# Patient Record
Sex: FEMALE | Race: WHITE | NOT HISPANIC OR LATINO | Employment: UNEMPLOYED | URBAN - METROPOLITAN AREA
[De-identification: names, ages, dates, MRNs, and addresses within clinical notes are randomized per-mention and may not be internally consistent; named-entity substitution may affect disease eponyms.]

---

## 2018-10-10 ENCOUNTER — HOSPITAL ENCOUNTER (EMERGENCY)
Facility: MEDICAL CENTER | Age: 23
End: 2018-10-10
Attending: EMERGENCY MEDICINE
Payer: MEDICAID

## 2018-10-10 VITALS
DIASTOLIC BLOOD PRESSURE: 70 MMHG | HEART RATE: 75 BPM | SYSTOLIC BLOOD PRESSURE: 120 MMHG | OXYGEN SATURATION: 98 % | BODY MASS INDEX: 20.5 KG/M2 | HEIGHT: 65 IN | WEIGHT: 123.02 LBS | RESPIRATION RATE: 18 BRPM | TEMPERATURE: 98 F

## 2018-10-10 DIAGNOSIS — N12 PYELONEPHRITIS: ICD-10-CM

## 2018-10-10 LAB
ALBUMIN SERPL BCP-MCNC: 4.9 G/DL (ref 3.2–4.9)
ALBUMIN/GLOB SERPL: 1.8 G/DL
ALP SERPL-CCNC: 49 U/L (ref 30–99)
ALT SERPL-CCNC: 9 U/L (ref 2–50)
ANION GAP SERPL CALC-SCNC: 12 MMOL/L (ref 0–11.9)
APPEARANCE UR: ABNORMAL
AST SERPL-CCNC: 15 U/L (ref 12–45)
BACTERIA #/AREA URNS HPF: ABNORMAL /HPF
BASOPHILS # BLD AUTO: 0.6 % (ref 0–1.8)
BASOPHILS # BLD: 0.07 K/UL (ref 0–0.12)
BILIRUB SERPL-MCNC: 0.5 MG/DL (ref 0.1–1.5)
BILIRUB UR QL STRIP.AUTO: NEGATIVE
BUN SERPL-MCNC: 13 MG/DL (ref 8–22)
CALCIUM SERPL-MCNC: 9.3 MG/DL (ref 8.5–10.5)
CHLORIDE SERPL-SCNC: 105 MMOL/L (ref 96–112)
CO2 SERPL-SCNC: 20 MMOL/L (ref 20–33)
COLOR UR: YELLOW
CREAT SERPL-MCNC: 0.92 MG/DL (ref 0.5–1.4)
EOSINOPHIL # BLD AUTO: 0.07 K/UL (ref 0–0.51)
EOSINOPHIL NFR BLD: 0.6 % (ref 0–6.9)
EPI CELLS #/AREA URNS HPF: NEGATIVE /HPF
ERYTHROCYTE [DISTWIDTH] IN BLOOD BY AUTOMATED COUNT: 48.2 FL (ref 35.9–50)
GLOBULIN SER CALC-MCNC: 2.8 G/DL (ref 1.9–3.5)
GLUCOSE SERPL-MCNC: 71 MG/DL (ref 65–99)
GLUCOSE UR STRIP.AUTO-MCNC: NEGATIVE MG/DL
HCG UR QL: NEGATIVE
HCT VFR BLD AUTO: 43.3 % (ref 37–47)
HGB BLD-MCNC: 14.7 G/DL (ref 12–16)
IMM GRANULOCYTES # BLD AUTO: 0.03 K/UL (ref 0–0.11)
IMM GRANULOCYTES NFR BLD AUTO: 0.2 % (ref 0–0.9)
KETONES UR STRIP.AUTO-MCNC: ABNORMAL MG/DL
LEUKOCYTE ESTERASE UR QL STRIP.AUTO: ABNORMAL
LYMPHOCYTES # BLD AUTO: 1.08 K/UL (ref 1–4.8)
LYMPHOCYTES NFR BLD: 8.9 % (ref 22–41)
MCH RBC QN AUTO: 32.9 PG (ref 27–33)
MCHC RBC AUTO-ENTMCNC: 33.9 G/DL (ref 33.6–35)
MCV RBC AUTO: 96.9 FL (ref 81.4–97.8)
MICRO URNS: ABNORMAL
MONOCYTES # BLD AUTO: 0.72 K/UL (ref 0–0.85)
MONOCYTES NFR BLD AUTO: 5.9 % (ref 0–13.4)
NEUTROPHILS # BLD AUTO: 10.16 K/UL (ref 2–7.15)
NEUTROPHILS NFR BLD: 83.8 % (ref 44–72)
NITRITE UR QL STRIP.AUTO: POSITIVE
NRBC # BLD AUTO: 0 K/UL
NRBC BLD-RTO: 0 /100 WBC
PH UR STRIP.AUTO: 6.5 [PH]
PLATELET # BLD AUTO: 301 K/UL (ref 164–446)
PMV BLD AUTO: 10.1 FL (ref 9–12.9)
POTASSIUM SERPL-SCNC: 3.7 MMOL/L (ref 3.6–5.5)
PROT SERPL-MCNC: 7.7 G/DL (ref 6–8.2)
PROT UR QL STRIP: 30 MG/DL
RBC # BLD AUTO: 4.47 M/UL (ref 4.2–5.4)
RBC # URNS HPF: ABNORMAL /HPF
RBC UR QL AUTO: ABNORMAL
SODIUM SERPL-SCNC: 137 MMOL/L (ref 135–145)
SP GR UR REFRACTOMETRY: 1.01
SP GR UR STRIP.AUTO: 1.01
UROBILINOGEN UR STRIP.AUTO-MCNC: 0.2 MG/DL
WBC # BLD AUTO: 12.1 K/UL (ref 4.8–10.8)
WBC #/AREA URNS HPF: ABNORMAL /HPF

## 2018-10-10 PROCEDURE — 80053 COMPREHEN METABOLIC PANEL: CPT

## 2018-10-10 PROCEDURE — 700102 HCHG RX REV CODE 250 W/ 637 OVERRIDE(OP): Performed by: EMERGENCY MEDICINE

## 2018-10-10 PROCEDURE — A9270 NON-COVERED ITEM OR SERVICE: HCPCS | Performed by: EMERGENCY MEDICINE

## 2018-10-10 PROCEDURE — 700111 HCHG RX REV CODE 636 W/ 250 OVERRIDE (IP): Performed by: EMERGENCY MEDICINE

## 2018-10-10 PROCEDURE — 85025 COMPLETE CBC W/AUTO DIFF WBC: CPT

## 2018-10-10 PROCEDURE — 36415 COLL VENOUS BLD VENIPUNCTURE: CPT

## 2018-10-10 PROCEDURE — 81025 URINE PREGNANCY TEST: CPT

## 2018-10-10 PROCEDURE — 87186 SC STD MICRODIL/AGAR DIL: CPT

## 2018-10-10 PROCEDURE — 87086 URINE CULTURE/COLONY COUNT: CPT

## 2018-10-10 PROCEDURE — 700105 HCHG RX REV CODE 258: Performed by: EMERGENCY MEDICINE

## 2018-10-10 PROCEDURE — 81001 URINALYSIS AUTO W/SCOPE: CPT

## 2018-10-10 PROCEDURE — 87077 CULTURE AEROBIC IDENTIFY: CPT

## 2018-10-10 PROCEDURE — 99285 EMERGENCY DEPT VISIT HI MDM: CPT

## 2018-10-10 PROCEDURE — 96374 THER/PROPH/DIAG INJ IV PUSH: CPT

## 2018-10-10 RX ORDER — PHENAZOPYRIDINE HYDROCHLORIDE 200 MG/1
200 TABLET, FILM COATED ORAL 3 TIMES DAILY PRN
Qty: 6 TAB | Refills: 0 | Status: SHIPPED | OUTPATIENT
Start: 2018-10-10

## 2018-10-10 RX ORDER — KETOROLAC TROMETHAMINE 30 MG/ML
15 INJECTION, SOLUTION INTRAMUSCULAR; INTRAVENOUS ONCE
Status: COMPLETED | OUTPATIENT
Start: 2018-10-10 | End: 2018-10-10

## 2018-10-10 RX ORDER — SULFAMETHOXAZOLE AND TRIMETHOPRIM 800; 160 MG/1; MG/1
1 TABLET ORAL ONCE
Status: COMPLETED | OUTPATIENT
Start: 2018-10-10 | End: 2018-10-10

## 2018-10-10 RX ORDER — SODIUM CHLORIDE 9 MG/ML
1000 INJECTION, SOLUTION INTRAVENOUS ONCE
Status: COMPLETED | OUTPATIENT
Start: 2018-10-10 | End: 2018-10-10

## 2018-10-10 RX ORDER — SULFAMETHOXAZOLE AND TRIMETHOPRIM 800; 160 MG/1; MG/1
1 TABLET ORAL 2 TIMES DAILY
Qty: 28 TAB | Refills: 0 | Status: SHIPPED | OUTPATIENT
Start: 2018-10-10 | End: 2018-10-24

## 2018-10-10 RX ORDER — PHENAZOPYRIDINE HYDROCHLORIDE 200 MG/1
200 TABLET, FILM COATED ORAL 3 TIMES DAILY PRN
Qty: 6 TAB | Refills: 0 | Status: SHIPPED | OUTPATIENT
Start: 2018-10-10 | End: 2018-10-10

## 2018-10-10 RX ADMIN — SODIUM CHLORIDE 1000 ML: 9 INJECTION, SOLUTION INTRAVENOUS at 20:28

## 2018-10-10 RX ADMIN — SULFAMETHOXAZOLE AND TRIMETHOPRIM 1 TABLET: 800; 160 TABLET ORAL at 23:03

## 2018-10-10 RX ADMIN — KETOROLAC TROMETHAMINE 15 MG: 30 INJECTION, SOLUTION INTRAMUSCULAR at 20:33

## 2018-10-10 ASSESSMENT — LIFESTYLE VARIABLES
TOTAL SCORE: 0
DO YOU DRINK ALCOHOL: YES
HAVE YOU EVER FELT YOU SHOULD CUT DOWN ON YOUR DRINKING: NO
HAVE PEOPLE ANNOYED YOU BY CRITICIZING YOUR DRINKING: NO
CONSUMPTION TOTAL: INCOMPLETE
TOTAL SCORE: 0
EVER FELT BAD OR GUILTY ABOUT YOUR DRINKING: NO
EVER HAD A DRINK FIRST THING IN THE MORNING TO STEADY YOUR NERVES TO GET RID OF A HANGOVER: NO
TOTAL SCORE: 0

## 2018-10-10 ASSESSMENT — PAIN SCALES - GENERAL: PAINLEVEL_OUTOF10: 2

## 2018-10-10 NOTE — LETTER
10/14/2018               Linda Carpenter  [address] [place] [address]        Dear Linda Olivo (MR#[id number])    This letter is sent in regards to your, recent visit to the [facility] Emergency Department on 10/10/2018.  During the visit, tests were performed to assist the physician in a medical diagnosis.  A review of those tests requires that we notify you of the following:    Your urine culture was POSITIVE for a bacteria called Escherichia coli. The antibiotic prescribed for you (sulfamethoxazole-trimethoprim) should be active to treat this bacteria. IT IS IMPORTANT THAT YOU CONTINUE TAKING YOUR ANTIBIOTIC UNTIL IT IS FINISHED.      Please feel free to contact me at the number below if you have any questions or concerns. Thank you for your cooperation in the matter.    Sincerely,  ED Culture Follow-Up Staff  Marline Berg, PharmD    [facility], Emergency Department  [address] [place] [id number]  [phone]

## 2018-10-11 NOTE — ED TRIAGE NOTES
Chief Complaint   Patient presents with   • Flank Pain     bilateral   • Painful Urination   • Urinary Frequency     Symptoms X 2 days.  Triage process explained to patient.  Pt back to waiting room.  Pt instructed to inform RN if any changes or questions arise.

## 2018-10-11 NOTE — ED NOTES
Amb to 61, agree with triage note.  Pt states inability to provide urine sample.  Will let RN know.  Chart up for ERP

## 2018-10-11 NOTE — ED PROVIDER NOTES
"ED Provider Note    Scribed for Rani Catherine M.D. by Kenan Botello. 10/10/2018, 8:01 PM.    Primary care provider: Pcp Pt States None  Means of arrival: Walk-in  History obtained from: Patient  History limited by: None    CHIEF COMPLAINT  Chief Complaint   Patient presents with   • Flank Pain     bilateral   • Painful Urination   • Urinary Frequency       HPI  Linda Carpenter is a 22 y.o. female who presents to the Emergency Department with complaints of bilateral flank pain and pain with urination onset 1-2 weeks ago. Per patient, approximately 1-2 weeks ago she began to develop pain with urination. She also notes that she developed bilateral flank pain since yesterday and reports that this pain is currently worse on her right flank. She describes this as an \"exploding\" pain and states that this has been constant since the onset. The patient has a history of pyelonephritis and notes that she tends to experience these symptoms a few times per year. However, she does not regularly receive antibiotics for this and is unaware of any resistance to any particular antibiotics.  She denies any other symptoms including vaginal discharge, fever, or chills.     REVIEW OF SYSTEMS  Pertinent positives include dysuria, bilateral flank pain which is worse on the right. Pertinent negatives include no fever, chills, vaginal discharge.  All other systems reviewed and negative.    PAST MEDICAL HISTORY   Past history of pyelonephritis     SURGICAL HISTORY  patient denies any surgical history    SOCIAL HISTORY  Social History   Substance Use Topics   • Smoking status: Current Every Day Smoker     Packs/day: 0.25     Years: 7.00     Types: Cigarettes   • Smokeless tobacco: Never Used      Comment: Trying to quit smoking. 3 cigarettes/day now   • Alcohol use No      History   Drug Use No       FAMILY HISTORY  Family History   Problem Relation Age of Onset   • Other Mother         fribromyalgia    • Other Father         kidney " "failure   • Asthma Brother        CURRENT MEDICATIONS  Home Medications     Reviewed by Refugio Sequeira R.N. (Registered Nurse) on 10/10/18 at 1959  Med List Status: <None>   Medication Last Dose Status   hydrocodone-acetaminophen (NORCO) 5-325 MG Tab per tablet  Active   hydrocodone-acetaminophen (NORCO) 5-325 MG Tab per tablet  Active   hydrocodone-acetaminophen (NORCO) 5-325 MG Tab per tablet  Active   metaxalone (SKELAXIN) 800 MG Tab  Active   methylPREDNISolone (MEDROL DOSPACK) 4 MG Tab  Active   naproxen (NAPROSYN) 375 MG Tab  Active   Prenatal Vit-Fe Fumarate-FA (PRENATAL PLUS PO)  Active                ALLERGIES  Allergies   Allergen Reactions   • Ceclor [Cefaclor] Rash     RXN= as baby       PHYSICAL EXAM  VITAL SIGNS: /90   Pulse (!) 110   Temp 37.4 °C (99.3 °F)   Resp 17   Ht 1.651 m (5' 5\")   Wt 55.8 kg (123 lb 0.3 oz)   SpO2 98%   BMI 20.47 kg/m²   Constitutional: Alert in no apparent distress.  HENT: No signs of trauma, Bilateral external ears normal, Nose normal.   Eyes: Pupils are equal and reactive, Conjunctiva normal, Non-icteric.   Neck: Normal range of motion, No tenderness, Supple, No stridor.   Cardiovascular: Regular rhythm, tachycardic. no murmurs.   Thorax & Lungs: Normal breath sounds, No respiratory distress, No wheezing, No chest tenderness.   Abdomen: Bowel sounds normal, Soft, No masses, No peritoneal signs. Mild lower abdominal tenderness. No rebound or guarding.   Skin: Warm, Dry, No erythema, No rash.   Back: Right sided CVA tenderness  Musculoskeletal:  No major deformities noted.   Neurologic: Alert, moving all extremities without difficulty, no focal deficits.    LABS  Labs Reviewed   URINALYSIS,CULTURE IF INDICATED - Abnormal; Notable for the following:        Result Value    Character Turbid (*)     Ketones Trace (*)     Protein 30 (*)     Nitrite Positive (*)     Leukocyte Esterase Large (*)     Occult Blood Moderate (*)     All other components within normal " limits   CBC WITH DIFFERENTIAL - Abnormal; Notable for the following:     WBC 12.1 (*)     Neutrophils-Polys 83.80 (*)     Lymphocytes 8.90 (*)     Neutrophils (Absolute) 10.16 (*)     All other components within normal limits   COMP METABOLIC PANEL - Abnormal; Notable for the following:     Anion Gap 12.0 (*)     All other components within normal limits   URINE MICROSCOPIC (W/UA) - Abnormal; Notable for the following:     WBC Packed (*)     RBC 2-5 (*)     Bacteria Many (*)     All other components within normal limits   HCG QUALITATIVE UR   REFRACTOMETER SG   ESTIMATED GFR   URINE CULTURE(NEW)     All labs reviewed by me.    COURSE & MEDICAL DECISION MAKING  Pertinent Labs & Imaging studies reviewed. (See chart for details)    Differential diagnoses include but are not limited to: Pyelonephritis, UTI    8:01 PM - Patient seen and examined at bedside. Patient will be treated with 1000 mL NS Infusion secondary to tachycardia. She will also be treated with 15 mg ketorolac. Ordered CBC with differential, CMP, urinalysis, HCG qualitative UR, refractometer SG to evaluate her symptoms.     10:30 PM - Ordered urine microscopic.     10:50 PM - patient will be treated with 800-160 mg sulfamethoxazole-trimethoprim tablet.     10:56 PM - Patient was reevaluated at bedside. She is feeling improved following resuscitation with IV fluids. Discussed lab and radiology results with the patient and informed them that she has a UTI and likely has pyelonephritis as well. Patient was given discharge instructions including strict return precautions with new or worsening symptoms. She was also informed that she will be sent home with antibiotic medication. She understands and verbalizes agreement with the plan of care.     HYDRATION: Based on the patient's presentation of Tachycardia the patient was given IV fluids. IV Hydration was used becasue oral hydration was not adequate alone. Upon recheck following hydration, the patient was  improved.    Decision Making:  This is a 22 y.o. year old female who presents with dysuria and flank pain.  She was slightly tachycardic on arrival and therefore was given IV fluids.  Her heart rate improved significantly after this.  She does have a slightly elevated white blood cell count.  She has normal kidney function.  She does have a urinalysis consistent with urinary tract infection.  She was given Bactrim for this I do think this is early pyelonephritis and therefore she will be treated for 14 days with Bactrim.  She is agreeable to this plan will be discharged.    The patient will return for new or worsening symptoms and is stable at the time of discharge. Patient was given return precautions. Patient and/or family member verbalizes understanding and will comply.    DISPOSITION:  Patient will be discharged home in stable condition.    FOLLOW UP:  [facility], Emergency Dept  [address] [place] [id number] [phone]    Return for fevers, worsening pain, vomiting or other concerns      OUTPATIENT MEDICATIONS:  Discharge Medication List as of 10/10/2018 10:54 PM      START taking these medications    Details   sulfamethoxazole-trimethoprim (BACTRIM DS) 800-160 MG tablet Take 1 Tab by mouth 2 times a day for 14 days., Disp-28 Tab, R-0, Print Rx Paper             FINAL IMPRESSION  1. Pyelonephritis               This dictation has been created using voice recognition software and/or scribes. The accuracy of the dictation is limited by the abilities of the software and the expertise of the scribes. I expect there may be some errors of grammar and possibly content. I made every attempt to manually correct the errors within my dictation. However, errors related to voice recognition software and/or scribes may still exist and should be interpreted within the appropriate context.     I, Kenan Botello (Scribe), am scribing for, and in the presence of, Rani Catherine,  M.D..    Electronically signed by: Kenan Botello (Scribe), 10/10/2018    I, Rani Catherine M.D. personally performed the services described in this documentation, as scribed by Kenan Botello in my presence, and it is both accurate and complete. C     The note accurately reflects work and decisions made by me.  Rani Catherine  10/11/2018  2:22 AM

## 2018-10-13 LAB
BACTERIA UR CULT: ABNORMAL
BACTERIA UR CULT: ABNORMAL
SIGNIFICANT IND 70042: ABNORMAL
SITE SITE: ABNORMAL
SOURCE SOURCE: ABNORMAL

## 2018-10-14 NOTE — ED NOTES
"ED Positive Culture Follow-up/Notification Note:    Date: 10/14/18      Patient seen in the ED on 10/10/2018 for bilateral flank pain and pain with urination with an onset 1-2 weeks ago.  1. Pyelonephritis       Patient received 1 DS Bactrim tablet orally before discharge.  Discharge Medication List as of 10/10/2018 10:54 PM      START taking these medications    Details   sulfamethoxazole-trimethoprim (BACTRIM DS) 800-160 MG tablet Take 1 Tab by mouth 2 times a day for 14 days., Disp-28 Tab, R-0, Print Rx Paper             Allergies: Ceclor [cefaclor]     Vitals:    10/10/18 1859 10/10/18 1901 10/10/18 2256   BP: 127/90  120/70   Pulse: (!) 110  75   Resp: 17  18   Temp: 37.4 °C (99.3 °F)  36.7 °C (98 °F)   SpO2: 98%  98%   Weight:  55.8 kg (123 lb 0.3 oz)    Height: 1.651 m (5' 5\") 1.651 m (5' 5\")        Final cultures:   Results     Procedure Component Value Units Date/Time    URINE CULTURE(NEW) [[id number]]  (Abnormal)  (Susceptibility) Collected:  10/10/18 2230    Order Status:  Completed Specimen:  Urine Updated:  10/13/18 0934     Significant Indicator POS (POS)     Source UR     Site --     Urine Culture -- (A)      Escherichia coli  >100,000 cfu/mL   (A)    Culture & Susceptibility     ESCHERICHIA COLI     Antibiotic Sensitivity Microscan Unit Status    Ampicillin Sensitive <=8 mcg/mL Final    Method: SENSITIVITY, AIRAM    Cefepime Sensitive <=8 mcg/mL Final    Method: SENSITIVITY, AIRAM    Cefotaxime Sensitive <=2 mcg/mL Final    Method: SENSITIVITY, AIRAM    Cefotetan Sensitive <=16 mcg/mL Final    Method: SENSITIVITY, AIRAM    Ceftazidime Sensitive <=1 mcg/mL Final    Method: SENSITIVITY, AIRAM    Ceftriaxone Sensitive <=8 mcg/mL Final    Method: SENSITIVITY, AIRAM    Cefuroxime Sensitive <=4 mcg/mL Final    Method: SENSITIVITY, AIRAM    Cephalothin Sensitive <=8 mcg/mL Final    Method: SENSITIVITY, AIRAM    Ciprofloxacin Sensitive <=1 mcg/mL Final    Method: SENSITIVITY, AIRAM    Gentamicin Sensitive <=4 mcg/mL Final    " Method: SENSITIVITY, AIRAM    Levofloxacin Sensitive <=2 mcg/mL Final    Method: SENSITIVITY, AIRAM    Nitrofurantoin Sensitive <=32 mcg/mL Final    Method: SENSITIVITY, AIRAM    Pip/Tazobactam Sensitive <=16 mcg/mL Final    Method: SENSITIVITY, AIRAM    Piperacillin Sensitive <=16 mcg/mL Final    Method: SENSITIVITY, AIRAM    Tigecycline Sensitive <=2 mcg/mL Final    Method: SENSITIVITY, AIRAM    Tobramycin Sensitive <=4 mcg/mL Final    Method: SENSITIVITY, AIRAM    Trimeth/Sulfa Sensitive <=2/38 mcg/mL Final    Method: SENSITIVITY, AIRAM                       URINALYSIS CULTURE, IF INDICATED [[id number]]  (Abnormal) Collected:  10/10/18 2230    Order Status:  Completed Specimen:  Urine Updated:  10/10/18 2245     Color Yellow     Character Turbid (A)     Specific Gravity 1.011     Ph 6.5     Glucose Negative mg/dL      Ketones Trace (A) mg/dL      Protein 30 (A) mg/dL      Bilirubin Negative     Urobilinogen, Urine 0.2     Nitrite Positive (A)     Leukocyte Esterase Large (A)     Occult Blood Moderate (A)     Micro Urine Req Microscopic          Plan:   Appropriate antibiotic therapy prescribed. No changes required based upon culture result.    Sent letter to patient to notify of positive culture result and encourage compliance with prescribed antibiotics.     Marline Berg, LouiseD

## 2022-12-28 ENCOUNTER — APPOINTMENT (OUTPATIENT)
Dept: URGENT CARE | Facility: PHYSICIAN GROUP | Age: 27
End: 2022-12-28
Payer: MEDICAID

## 2024-09-02 ENCOUNTER — HOSPITAL ENCOUNTER (INPATIENT)
Facility: MEDICAL CENTER | Age: 29
LOS: 5 days | DRG: 433 | End: 2024-09-07
Attending: HOSPITALIST | Admitting: STUDENT IN AN ORGANIZED HEALTH CARE EDUCATION/TRAINING PROGRAM
Payer: MEDICAID

## 2024-09-02 ENCOUNTER — HOSPITAL ENCOUNTER (OUTPATIENT)
Dept: RADIOLOGY | Facility: MEDICAL CENTER | Age: 29
End: 2024-09-02
Payer: MEDICAID

## 2024-09-02 DIAGNOSIS — R18.8 CIRRHOSIS OF LIVER WITH ASCITES, UNSPECIFIED HEPATIC CIRRHOSIS TYPE (HCC): ICD-10-CM

## 2024-09-02 DIAGNOSIS — D64.9 ANEMIA, UNSPECIFIED TYPE: ICD-10-CM

## 2024-09-02 DIAGNOSIS — K74.60 CIRRHOSIS OF LIVER WITH ASCITES, UNSPECIFIED HEPATIC CIRRHOSIS TYPE (HCC): ICD-10-CM

## 2024-09-02 PROCEDURE — 770006 HCHG ROOM/CARE - MED/SURG/GYN SEMI*

## 2024-09-02 SDOH — ECONOMIC STABILITY: TRANSPORTATION INSECURITY
IN THE PAST 12 MONTHS, HAS LACK OF RELIABLE TRANSPORTATION KEPT YOU FROM MEDICAL APPOINTMENTS, MEETINGS, WORK OR FROM GETTING THINGS NEEDED FOR DAILY LIVING?: NO

## 2024-09-02 SDOH — ECONOMIC STABILITY: TRANSPORTATION INSECURITY
IN THE PAST 12 MONTHS, HAS THE LACK OF TRANSPORTATION KEPT YOU FROM MEDICAL APPOINTMENTS OR FROM GETTING MEDICATIONS?: NO

## 2024-09-02 ASSESSMENT — COGNITIVE AND FUNCTIONAL STATUS - GENERAL
SUGGESTED CMS G CODE MODIFIER DAILY ACTIVITY: CH
MOBILITY SCORE: 24
DAILY ACTIVITIY SCORE: 24
SUGGESTED CMS G CODE MODIFIER MOBILITY: CH

## 2024-09-02 ASSESSMENT — LIFESTYLE VARIABLES
HAVE PEOPLE ANNOYED YOU BY CRITICIZING YOUR DRINKING: NO
CONSUMPTION TOTAL: NEGATIVE
HOW MANY TIMES IN THE PAST YEAR HAVE YOU HAD 5 OR MORE DRINKS IN A DAY: 0
TOTAL SCORE: 0
ON A TYPICAL DAY WHEN YOU DRINK ALCOHOL HOW MANY DRINKS DO YOU HAVE: 2
TOTAL SCORE: 0
EVER FELT BAD OR GUILTY ABOUT YOUR DRINKING: NO
TOTAL SCORE: 0
HAVE YOU EVER FELT YOU SHOULD CUT DOWN ON YOUR DRINKING: NO
EVER HAD A DRINK FIRST THING IN THE MORNING TO STEADY YOUR NERVES TO GET RID OF A HANGOVER: NO
DOES PATIENT WANT TO STOP DRINKING: NO
ALCOHOL_USE: YES
AVERAGE NUMBER OF DAYS PER WEEK YOU HAVE A DRINK CONTAINING ALCOHOL: 1

## 2024-09-02 ASSESSMENT — SOCIAL DETERMINANTS OF HEALTH (SDOH)
IN THE PAST 12 MONTHS, HAS THE ELECTRIC, GAS, OIL, OR WATER COMPANY THREATENED TO SHUT OFF SERVICE IN YOUR HOME?: NO
WITHIN THE PAST 12 MONTHS, YOU WORRIED THAT YOUR FOOD WOULD RUN OUT BEFORE YOU GOT THE MONEY TO BUY MORE: NEVER TRUE
WITHIN THE PAST 12 MONTHS, THE FOOD YOU BOUGHT JUST DIDN'T LAST AND YOU DIDN'T HAVE MONEY TO GET MORE: NEVER TRUE

## 2024-09-02 ASSESSMENT — PAIN DESCRIPTION - PAIN TYPE: TYPE: ACUTE PAIN

## 2024-09-03 ENCOUNTER — APPOINTMENT (OUTPATIENT)
Dept: RADIOLOGY | Facility: MEDICAL CENTER | Age: 29
DRG: 433 | End: 2024-09-03
Attending: HOSPITALIST
Payer: MEDICAID

## 2024-09-03 ENCOUNTER — ANESTHESIA EVENT (OUTPATIENT)
Dept: SURGERY | Facility: MEDICAL CENTER | Age: 29
DRG: 433 | End: 2024-09-03
Payer: MEDICAID

## 2024-09-03 ENCOUNTER — APPOINTMENT (OUTPATIENT)
Dept: RADIOLOGY | Facility: MEDICAL CENTER | Age: 29
DRG: 433 | End: 2024-09-03
Attending: NURSE PRACTITIONER
Payer: MEDICAID

## 2024-09-03 ENCOUNTER — APPOINTMENT (OUTPATIENT)
Dept: RADIOLOGY | Facility: MEDICAL CENTER | Age: 29
DRG: 433 | End: 2024-09-03
Attending: STUDENT IN AN ORGANIZED HEALTH CARE EDUCATION/TRAINING PROGRAM
Payer: MEDICAID

## 2024-09-03 LAB
ALBUMIN FLD-MCNC: 0.4 G/DL
ALBUMIN SERPL BCP-MCNC: 2.4 G/DL (ref 3.2–4.9)
ALBUMIN SERPL BCP-MCNC: 2.7 G/DL (ref 3.2–4.9)
ALBUMIN/GLOB SERPL: 1 G/DL
ALP SERPL-CCNC: 107 U/L (ref 30–99)
ALT SERPL-CCNC: 22 U/L (ref 2–50)
AMMONIA PLAS-SCNC: 32 UMOL/L (ref 11–45)
ANION GAP SERPL CALC-SCNC: 7 MMOL/L (ref 7–16)
ANISOCYTOSIS BLD QL SMEAR: ABNORMAL
APPEARANCE FLD: NORMAL
AST SERPL-CCNC: 75 U/L (ref 12–45)
BASOPHILS # BLD AUTO: 0.8 % (ref 0–1.8)
BASOPHILS # BLD: 0.04 K/UL (ref 0–0.12)
BASOPHILS NFR FLD: 1 %
BILIRUB SERPL-MCNC: 0.7 MG/DL (ref 0.1–1.5)
BODY FLD TYPE: NORMAL
BUN SERPL-MCNC: 5 MG/DL (ref 8–22)
CALCIUM ALBUM COR SERPL-MCNC: 8.7 MG/DL (ref 8.5–10.5)
CALCIUM SERPL-MCNC: 7.4 MG/DL (ref 8.5–10.5)
CELLS FLD: 9
CHLORIDE SERPL-SCNC: 107 MMOL/L (ref 96–112)
CO2 SERPL-SCNC: 28 MMOL/L (ref 20–33)
COLOR FLD: YELLOW
COMMENT 1642: NORMAL
CREAT SERPL-MCNC: 0.44 MG/DL (ref 0.5–1.4)
EOSINOPHIL # BLD AUTO: 0.19 K/UL (ref 0–0.51)
EOSINOPHIL NFR BLD: 3.7 % (ref 0–6.9)
ERYTHROCYTE [DISTWIDTH] IN BLOOD BY AUTOMATED COUNT: 91.8 FL (ref 35.9–50)
FERRITIN SERPL-MCNC: 572 NG/ML (ref 10–291)
FOLATE SERPL-MCNC: <2 NG/ML
GFR SERPLBLD CREATININE-BSD FMLA CKD-EPI: 133 ML/MIN/1.73 M 2
GLOBULIN SER CALC-MCNC: 2.5 G/DL (ref 1.9–3.5)
GLUCOSE SERPL-MCNC: 67 MG/DL (ref 65–99)
GRAM STN SPEC: NORMAL
HAV IGM SERPL QL IA: NORMAL
HBV CORE IGM SER QL: NORMAL
HBV SURFACE AG SER QL: NORMAL
HCT VFR BLD AUTO: 25.8 % (ref 37–47)
HCV AB SER QL: NORMAL
HGB BLD-MCNC: 8.7 G/DL (ref 12–16)
HGB RETIC QN AUTO: 39.8 PG/CELL (ref 29–35)
HYPOCHROMIA BLD QL SMEAR: ABNORMAL
IMM GRANULOCYTES # BLD AUTO: 0 K/UL (ref 0–0.11)
IMM GRANULOCYTES NFR BLD AUTO: 0 % (ref 0–0.9)
IMM RETICS NFR: 42.4 % (ref 2.6–16.1)
IRON SATN MFR SERPL: ABNORMAL % (ref 15–55)
IRON SERPL-MCNC: 134 UG/DL (ref 40–170)
LYMPHOCYTES # BLD AUTO: 2.88 K/UL (ref 1–4.8)
LYMPHOCYTES NFR BLD: 56.3 % (ref 22–41)
LYMPHOCYTES NFR FLD: 35 %
MACROCYTES BLD QL SMEAR: ABNORMAL
MCH RBC QN AUTO: 41 PG (ref 27–33)
MCHC RBC AUTO-ENTMCNC: 33.7 G/DL (ref 32.2–35.5)
MCV RBC AUTO: 121.7 FL (ref 81.4–97.8)
MONOCYTES # BLD AUTO: 0.39 K/UL (ref 0–0.85)
MONOCYTES NFR BLD AUTO: 7.6 % (ref 0–13.4)
MONOS+MACROS NFR FLD MANUAL: 55 %
MORPHOLOGY BLD-IMP: NORMAL
NEUTROPHILS # BLD AUTO: 1.62 K/UL (ref 1.82–7.42)
NEUTROPHILS NFR BLD: 31.6 % (ref 44–72)
NEUTROPHILS NFR FLD: 0 %
NRBC # BLD AUTO: 0.02 K/UL
NRBC BLD-RTO: 0.4 /100 WBC (ref 0–0.2)
NUC CELL # FLD: 82 CELLS/UL
PLATELET # BLD AUTO: 298 K/UL (ref 164–446)
PLATELET BLD QL SMEAR: NORMAL
PMV BLD AUTO: 9.6 FL (ref 9–12.9)
POIKILOCYTOSIS BLD QL SMEAR: NORMAL
POTASSIUM SERPL-SCNC: 3.8 MMOL/L (ref 3.6–5.5)
PROT FLD-MCNC: 1 G/DL
PROT SERPL-MCNC: 4.9 G/DL (ref 6–8.2)
RBC # BLD AUTO: 2.12 M/UL (ref 4.2–5.4)
RBC # FLD: <2000 CELLS/UL
RBC BLD AUTO: PRESENT
RETICS # AUTO: 0.09 M/UL (ref 0.04–0.12)
RETICS/RBC NFR: 4.2 % (ref 0.8–2.6)
SIGNIFICANT IND 70042: NORMAL
SITE SITE: NORMAL
SODIUM SERPL-SCNC: 142 MMOL/L (ref 135–145)
SOURCE SOURCE: NORMAL
STOMATOCYTES BLD QL SMEAR: NORMAL
TARGETS BLD QL SMEAR: NORMAL
TIBC SERPL-MCNC: ABNORMAL UG/DL (ref 250–450)
TSH SERPL DL<=0.005 MIU/L-ACNC: 0.79 UIU/ML (ref 0.38–5.33)
UIBC SERPL-MCNC: <17 UG/DL (ref 110–370)
VIT B12 SERPL-MCNC: 466 PG/ML (ref 211–911)
VIT B12 SERPL-MCNC: 579 PG/ML (ref 211–911)
WBC # BLD AUTO: 5.1 K/UL (ref 4.8–10.8)

## 2024-09-03 PROCEDURE — 93975 VASCULAR STUDY: CPT

## 2024-09-03 PROCEDURE — 86381 MITOCHONDRIAL ANTIBODY EACH: CPT

## 2024-09-03 PROCEDURE — 89051 BODY FLUID CELL COUNT: CPT

## 2024-09-03 PROCEDURE — 82040 ASSAY OF SERUM ALBUMIN: CPT

## 2024-09-03 PROCEDURE — 82390 ASSAY OF CERULOPLASMIN: CPT

## 2024-09-03 PROCEDURE — 82140 ASSAY OF AMMONIA: CPT

## 2024-09-03 PROCEDURE — 86038 ANTINUCLEAR ANTIBODIES: CPT

## 2024-09-03 PROCEDURE — 82042 OTHER SOURCE ALBUMIN QUAN EA: CPT

## 2024-09-03 PROCEDURE — 85046 RETICYTE/HGB CONCENTRATE: CPT

## 2024-09-03 PROCEDURE — 82746 ASSAY OF FOLIC ACID SERUM: CPT

## 2024-09-03 PROCEDURE — 770006 HCHG ROOM/CARE - MED/SURG/GYN SEMI*

## 2024-09-03 PROCEDURE — 86015 ACTIN ANTIBODY EACH: CPT

## 2024-09-03 PROCEDURE — 87076 CULTURE ANAEROBE IDENT EACH: CPT

## 2024-09-03 PROCEDURE — 76705 ECHO EXAM OF ABDOMEN: CPT

## 2024-09-03 PROCEDURE — 84157 ASSAY OF PROTEIN OTHER: CPT

## 2024-09-03 PROCEDURE — 87015 SPECIMEN INFECT AGNT CONCNTJ: CPT

## 2024-09-03 PROCEDURE — 80053 COMPREHEN METABOLIC PANEL: CPT

## 2024-09-03 PROCEDURE — 83540 ASSAY OF IRON: CPT

## 2024-09-03 PROCEDURE — 82607 VITAMIN B-12: CPT | Mod: 91

## 2024-09-03 PROCEDURE — 700102 HCHG RX REV CODE 250 W/ 637 OVERRIDE(OP): Performed by: NURSE PRACTITIONER

## 2024-09-03 PROCEDURE — 82728 ASSAY OF FERRITIN: CPT

## 2024-09-03 PROCEDURE — 87186 SC STD MICRODIL/AGAR DIL: CPT

## 2024-09-03 PROCEDURE — 700111 HCHG RX REV CODE 636 W/ 250 OVERRIDE (IP): Mod: JZ | Performed by: STUDENT IN AN ORGANIZED HEALTH CARE EDUCATION/TRAINING PROGRAM

## 2024-09-03 PROCEDURE — 84443 ASSAY THYROID STIM HORMONE: CPT

## 2024-09-03 PROCEDURE — 83550 IRON BINDING TEST: CPT

## 2024-09-03 PROCEDURE — G0480 DRUG TEST DEF 1-7 CLASSES: HCPCS

## 2024-09-03 PROCEDURE — 99223 1ST HOSP IP/OBS HIGH 75: CPT | Performed by: STUDENT IN AN ORGANIZED HEALTH CARE EDUCATION/TRAINING PROGRAM

## 2024-09-03 PROCEDURE — 80074 ACUTE HEPATITIS PANEL: CPT

## 2024-09-03 PROCEDURE — 0W9G3ZZ DRAINAGE OF PERITONEAL CAVITY, PERCUTANEOUS APPROACH: ICD-10-PCS | Performed by: NURSE PRACTITIONER

## 2024-09-03 PROCEDURE — 49083 ABD PARACENTESIS W/IMAGING: CPT

## 2024-09-03 PROCEDURE — 85025 COMPLETE CBC W/AUTO DIFF WBC: CPT

## 2024-09-03 PROCEDURE — 87205 SMEAR GRAM STAIN: CPT

## 2024-09-03 PROCEDURE — 36415 COLL VENOUS BLD VENIPUNCTURE: CPT

## 2024-09-03 PROCEDURE — A9270 NON-COVERED ITEM OR SERVICE: HCPCS | Performed by: NURSE PRACTITIONER

## 2024-09-03 PROCEDURE — 87070 CULTURE OTHR SPECIMN AEROBIC: CPT

## 2024-09-03 PROCEDURE — 99254 IP/OBS CNSLTJ NEW/EST MOD 60: CPT | Performed by: NURSE PRACTITIONER

## 2024-09-03 RX ORDER — MORPHINE SULFATE 4 MG/ML
1 INJECTION INTRAVENOUS EVERY 4 HOURS PRN
Status: COMPLETED | OUTPATIENT
Start: 2024-09-03 | End: 2024-09-03

## 2024-09-03 RX ORDER — SPIRONOLACTONE 25 MG/1
25 TABLET ORAL
Status: DISCONTINUED | OUTPATIENT
Start: 2024-09-03 | End: 2024-09-07 | Stop reason: HOSPADM

## 2024-09-03 RX ORDER — FUROSEMIDE 20 MG
20 TABLET ORAL
Status: DISCONTINUED | OUTPATIENT
Start: 2024-09-03 | End: 2024-09-07 | Stop reason: HOSPADM

## 2024-09-03 RX ORDER — ESCITALOPRAM OXALATE 10 MG/1
10 TABLET ORAL DAILY
COMMUNITY

## 2024-09-03 RX ORDER — PANTOPRAZOLE SODIUM 40 MG/1
40 TABLET, DELAYED RELEASE ORAL DAILY
Status: ON HOLD | COMMUNITY
End: 2024-09-07

## 2024-09-03 RX ORDER — ONDANSETRON 2 MG/ML
4 INJECTION INTRAMUSCULAR; INTRAVENOUS EVERY 4 HOURS PRN
Status: DISCONTINUED | OUTPATIENT
Start: 2024-09-03 | End: 2024-09-07 | Stop reason: HOSPADM

## 2024-09-03 RX ORDER — PEG-3350, SODIUM SULFATE, SODIUM CHLORIDE, POTASSIUM CHLORIDE, SODIUM ASCORBATE AND ASCORBIC ACID 7.5-2.691G
100 KIT ORAL 2 TIMES DAILY
Status: COMPLETED | OUTPATIENT
Start: 2024-09-03 | End: 2024-09-03

## 2024-09-03 RX ADMIN — MORPHINE SULFATE 1 MG: 4 INJECTION, SOLUTION INTRAMUSCULAR; INTRAVENOUS at 11:34

## 2024-09-03 RX ADMIN — MORPHINE SULFATE 1 MG: 4 INJECTION, SOLUTION INTRAMUSCULAR; INTRAVENOUS at 20:05

## 2024-09-03 RX ADMIN — PEG-3350, SODIUM SULFATE, SODIUM CHLORIDE, POTASSIUM CHLORIDE, SODIUM ASCORBATE AND ASCORBIC ACID 100 G: KIT at 20:08

## 2024-09-03 RX ADMIN — MORPHINE SULFATE 1 MG: 4 INJECTION, SOLUTION INTRAMUSCULAR; INTRAVENOUS at 04:05

## 2024-09-03 RX ADMIN — PEG-3350, SODIUM SULFATE, SODIUM CHLORIDE, POTASSIUM CHLORIDE, SODIUM ASCORBATE AND ASCORBIC ACID 100 G: KIT at 16:49

## 2024-09-03 ASSESSMENT — ENCOUNTER SYMPTOMS
VOMITING: 1
HEMOPTYSIS: 0
NECK PAIN: 0
WEIGHT LOSS: 1
DEPRESSION: 0
DOUBLE VISION: 0
FEVER: 0
DIZZINESS: 0
DIARRHEA: 0
NERVOUS/ANXIOUS: 0
SENSORY CHANGE: 1
SHORTNESS OF BREATH: 0
FLANK PAIN: 0
NAUSEA: 1
BRUISES/BLEEDS EASILY: 0
HEARTBURN: 0
CHILLS: 0
FALLS: 0
CHILLS: 1
MYALGIAS: 0
BLOOD IN STOOL: 1
PALPITATIONS: 0
SORE THROAT: 0
WEAKNESS: 1
COUGH: 0
ABDOMINAL PAIN: 1
BLURRED VISION: 0
HEADACHES: 0
CONSTIPATION: 0

## 2024-09-03 ASSESSMENT — PAIN DESCRIPTION - PAIN TYPE
TYPE: ACUTE PAIN

## 2024-09-03 ASSESSMENT — PATIENT HEALTH QUESTIONNAIRE - PHQ9
SUM OF ALL RESPONSES TO PHQ QUESTIONS 1-9: 23
SUM OF ALL RESPONSES TO PHQ9 QUESTIONS 1 AND 2: 6
SUM OF ALL RESPONSES TO PHQ9 QUESTIONS 1 AND 2: 0
8. MOVING OR SPEAKING SO SLOWLY THAT OTHER PEOPLE COULD HAVE NOTICED. OR THE OPPOSITE, BEING SO FIGETY OR RESTLESS THAT YOU HAVE BEEN MOVING AROUND A LOT MORE THAN USUAL: NEARLY EVERY DAY
2. FEELING DOWN, DEPRESSED, IRRITABLE, OR HOPELESS: NEARLY EVERY DAY
6. FEELING BAD ABOUT YOURSELF - OR THAT YOU ARE A FAILURE OR HAVE LET YOURSELF OR YOUR FAMILY DOWN: NEARLY EVERY DAY
1. LITTLE INTEREST OR PLEASURE IN DOING THINGS: NEARLY EVERY DAY
7. TROUBLE CONCENTRATING ON THINGS, SUCH AS READING THE NEWSPAPER OR WATCHING TELEVISION: NEARLY EVERY DAY
4. FEELING TIRED OR HAVING LITTLE ENERGY: NEARLY EVERY DAY
1. LITTLE INTEREST OR PLEASURE IN DOING THINGS: NOT AT ALL
5. POOR APPETITE OR OVEREATING: NEARLY EVERY DAY
3. TROUBLE FALLING OR STAYING ASLEEP OR SLEEPING TOO MUCH: MORE THAN HALF THE DAYS
9. THOUGHTS THAT YOU WOULD BE BETTER OFF DEAD, OR OF HURTING YOURSELF: NOT AT ALL

## 2024-09-03 ASSESSMENT — SOCIAL DETERMINANTS OF HEALTH (SDOH)
WITHIN THE LAST YEAR, HAVE TO BEEN RAPED OR FORCED TO HAVE ANY KIND OF SEXUAL ACTIVITY BY YOUR PARTNER OR EX-PARTNER?: NO
WITHIN THE LAST YEAR, HAVE YOU BEEN AFRAID OF YOUR PARTNER OR EX-PARTNER?: YES
WITHIN THE LAST YEAR, HAVE YOU BEEN KICKED, HIT, SLAPPED, OR OTHERWISE PHYSICALLY HURT BY YOUR PARTNER OR EX-PARTNER?: YES
WITHIN THE LAST YEAR, HAVE YOU BEEN HUMILIATED OR EMOTIONALLY ABUSED IN OTHER WAYS BY YOUR PARTNER OR EX-PARTNER?: YES

## 2024-09-03 ASSESSMENT — LIFESTYLE VARIABLES: SUBSTANCE_ABUSE: 0

## 2024-09-03 NOTE — PROGRESS NOTES
Assumed pt care with RN. Pt transferred to unit via gurney on room air, and walked steadily to unit bed. Pt is A&OX4 and states she is in 10/10 (will give medication once ordered). Plan of care discussed, no further questions. 2RN skin check and admit profile completed. Personal belongings and call light within reach.

## 2024-09-03 NOTE — H&P
Hospital Medicine History & Physical Note    Date of Service  9/3/2024    Primary Care Physician  Pcp Pt States None    Consultants  none    Code Status  Full Code    Chief Complaint  Abdominal pain       History of Presenting Illness  Linda Carpenter is a 28 y.o. female who presented 9/2/2024 with abdominal distention and pain for few weeks with associated nausea and vomiting.  Patient reports occasionally drinking alcohol.  Denies any chest pain, fever, chills.  In the ER at outside facility CT abdomen pelvis showed hepatic steatosis, portal hypertension, gallbladder distended of unclear etiology suggestion of wall thickening distal stomach raising concern for gastritis.  Patient transferred here for GI evaluation.    I discussed the plan of care with patient.    Review of Systems  Review of Systems   Constitutional:  Negative for chills and fever.   HENT:  Negative for hearing loss and tinnitus.    Eyes:  Negative for blurred vision and double vision.   Respiratory:  Negative for cough and hemoptysis.    Cardiovascular:  Negative for chest pain and palpitations.   Gastrointestinal:  Positive for abdominal pain, nausea and vomiting. Negative for heartburn.   Genitourinary:  Negative for dysuria and urgency.   Musculoskeletal:  Negative for myalgias and neck pain.   Neurological:  Negative for dizziness and headaches.   Endo/Heme/Allergies:  Does not bruise/bleed easily.   Psychiatric/Behavioral:  Negative for depression and suicidal ideas.        Past Medical History   has no past medical history on file.    Surgical History   has no past surgical history on file.     Family History  family history includes Asthma in her brother; Other in her father and mother.   Family history reviewed with patient. There is no family history that is pertinent to the chief complaint.     Social History   reports that she has been smoking cigarettes. She has a 1.8 pack-year smoking history. She has never used smokeless  tobacco. She reports that she does not drink alcohol and does not use drugs.    Allergies  Allergies   Allergen Reactions    Ceclor [Cefaclor] Rash     RXN= as baby       Medications  Prior to Admission Medications   Prescriptions Last Dose Informant Patient Reported? Taking?   escitalopram (LEXAPRO) 10 MG Tab 9/1/2024 at 2200  Yes Yes   Sig: Take 10 mg by mouth every day.   pantoprazole (PROTONIX) 40 MG Tablet Delayed Response 9/1/2024 at 0900  Yes Yes   Sig: Take 40 mg by mouth every day.      Facility-Administered Medications: None       Physical Exam  Temp:  [36 °C (96.8 °F)] 36 °C (96.8 °F)  Pulse:  [71] 71  Resp:  [16] 16  BP: (97)/(69) 97/69  SpO2:  [97 %] 97 %  Blood Pressure: 97/69   Temperature: 36 °C (96.8 °F)   Pulse: 71   Respiration: 16   Pulse Oximetry: 97 %       Physical Exam  Vitals and nursing note reviewed.   Constitutional:       Appearance: Normal appearance.   HENT:      Head: Normocephalic and atraumatic.      Right Ear: Tympanic membrane normal.      Left Ear: Tympanic membrane normal.      Nose: Nose normal.      Mouth/Throat:      Mouth: Mucous membranes are moist.      Pharynx: Oropharynx is clear.   Eyes:      Extraocular Movements: Extraocular movements intact.      Pupils: Pupils are equal, round, and reactive to light.   Cardiovascular:      Rate and Rhythm: Normal rate and regular rhythm.      Pulses: Normal pulses.      Heart sounds: Normal heart sounds.   Pulmonary:      Effort: Pulmonary effort is normal.      Breath sounds: Normal breath sounds.   Abdominal:      General: Bowel sounds are normal. There is no distension.      Palpations: Abdomen is soft. There is no mass.   Musculoskeletal:         General: Normal range of motion.      Cervical back: Neck supple.   Skin:     General: Skin is warm.      Capillary Refill: Capillary refill takes less than 2 seconds.   Neurological:      General: No focal deficit present.      Mental Status: She is alert and oriented to person, place,  "and time. Mental status is at baseline.   Psychiatric:         Mood and Affect: Mood normal.         Behavior: Behavior normal.         Laboratory:          No results for input(s): \"ALTSGPT\", \"ASTSGOT\", \"ALKPHOSPHAT\", \"TBILIRUBIN\", \"DBILIRUBIN\", \"GAMMAGT\", \"AMYLASE\", \"LIPASE\", \"ALB\", \"PREALBUMIN\", \"GLUCOSE\" in the last 72 hours.      No results for input(s): \"NTPROBNP\" in the last 72 hours.      No results for input(s): \"TROPONINT\" in the last 72 hours.    Imaging:  US-RUQ    (Results Pending)       no X-Ray or EKG requiring interpretation    Assessment/Plan:  Justification for Admission Status  I anticipate this patient will require at least two midnights for appropriate medical management, necessitating inpatient admission because new onset cirrhosis.    Patient will need a Med/Surg bed on MEDICAL service .  The need is secondary to see above.    * Cirrhosis (HCC)- (present on admission)  Assessment & Plan  Start on aldactone and lasix   Check hepatitis panel, ammonia   IR consult for paracentesis           VTE prophylaxis: SCDs/TEDs  "

## 2024-09-03 NOTE — DIETARY
"Nutrition Services: Initial Assessment     Day 1 of admit. Linda Carpenter is a 28 y.o. female with admitting DX of Cirrhosis (HCC) [K74.60]     Consult received for MST score >/= 2 for 14-23lb wt loss x 3 months and poor PO     Nutrition Assessment:      Height: 162.6 cm (5' 4\")  Weight: 64.6 kg (142 lb 6.7 oz)  Weight taken via bed scale on 9/3  Body mass index is 24.45 kg/m². BMI classification: Normal.  Wt Readings from Last 6 Encounters:   09/03/24 64.6 kg (142 lb 6.7 oz)   10/10/18 55.8 kg (123 lb 0.3 oz)   08/12/16 55.3 kg (122 lb)   04/13/16 56.7 kg (125 lb)   03/30/16 56.7 kg (125 lb)   03/16/16 56.7 kg (125 lb)     Objective:  Pertinent medical hx: No past medical history on file.  Pertinent labs 9/3: Reviewed.    Pertinent meds: lasix (held), aldactone (held)   Skin/wounds: No documented wounds, no edema   Food Allergies: NKA to food  Last BM:  (PTA) per flowsheets     Current diet order:   Clear liquids  Per ADLs, refused breakfast this am.     Subjective:   Per H&P: Pt presented w/ abdominal distention and pain x weeks w/ n/v.  RD visited pt at bedside.Pt reported that she struggles to eat d/t having emesis after eating. She said that some days, she would have emesis 1-2 hrs after eating and some day it would be right after she ate. She said that she used to tolerate dairy but the she is not able to eat dairy anymore. Pt reported that this has been going on x 2 years. Pt mentioned that she typically tries to eat 2 meals/day. She reports UBW of 120lbs and that her wt is up d/t fluid in her abdomen. Pt said that she lost 10-15lbs x 2 months but is unsure why wt loss occurred. Pt expressed food preferences.    Nutrition Focused Physical Exam (NFPE)   Weight loss: Per chart review, pt wt is up, however no recent wts in </= 1 year.  Muscle mass: Well-nourished  Subcutaneous fat: Well-nourished  Fluid Accumulation: NA  Reduced  Strength: N/A in acute care setting.     Nutrition Diagnosis:  "     Inadequate oral intake related to N/V aeb pt report of poor tolerance to food.     Based on RD assessment at this time, pt does not meet criteria in congruence with ASPEN/Academy guidelines for malnutrition.        Nutrition Interventions:      Recommend advancing diet as medically feasible per MD and as tolerated.  Recommend Ensure Clear TID to provide additional calories/protein    Nutrition Monitoring and Evaluation:     Monitor nutrition POC  Monitor vital signs pertinent to nutrition       RD following and will provide updated recommendations as indicated.

## 2024-09-03 NOTE — PROGRESS NOTES
Patient changed to liquid diet. Transport arrived at 1440. Report given to Nitin from transport. Patient taken to [facility] at 1442 by wheelchair.

## 2024-09-03 NOTE — PROGRESS NOTES
Patient report receive and assumed care of patient. Assessed patient at 0745. Patient is Aox4. Patient is in no pain. Patient is on room air. Abdomen is tender, soft, and rounded. Patient is up by self to bathroom. Patient IV is clean, dry, and intake. Bed is in low locked position. Patient personal belonging and call light are with in reach.

## 2024-09-03 NOTE — CARE PLAN
The patient is Stable - Low risk of patient condition declining or worsening    Shift Goals  Clinical Goals: pain, comfort  Patient Goals: rest  Family Goals: GLORY    Progress made toward(s) clinical / shift goals: Patient will have bowel movement at least once during the course of shift. Followed with stool sample. Maintain pain control during course of shift.    Problem: Knowledge Deficit - Standard  Goal: Patient and family/care givers will demonstrate understanding of plan of care, disease process/condition, diagnostic tests and medications  Outcome: Progressing    Patient updated on plan of care. Continue to educate patient on treatment, procedures, and medications.     Problem: Pain - Standard  Goal: Alleviation of pain or a reduction in pain to the patient’s comfort goal  Outcome: Progressing    Pain rating at 7/10 in abdomen. Pain managed by Morphine. Monitor for improved pain. Continue medication administration and use of pillows for support.    Problem: Gastrointestinal Irritability  Goal: Nausea and vomiting will be absent or improve  Outcome: Progressing    Patient reports no nausea after pain medication given. Continue to monitor and provide comfort.     Problem: Bowel Elimination  Goal: Establish and maintain regular bowel function  Outcome: Not Met    Patient had bowel movement 9/1. Bowel movements are loose, dark, and inconsistent. Continue to monitor and take stool sample.

## 2024-09-03 NOTE — PROGRESS NOTES
"[facility] TRIAGE OFFICER DIRECT ADMISSION REPORT         - I spoke and discussed the case with the ER physician, Dr. Ye  - Chief complaint: Ascities  - Pertinent history & patient course:  28-year-old female without any significant past medical history who presents to the hospital for new onset ascites.  She is found to have elevated liver enzymes..  Gallbladder was found to be distended without any gallstones.  MELD score is 9.  She does not have any history of alcohol abuse.  She presents with new onset cirrhosis in the outlying facility is requesting GI consult.                    Please inform the \"Triage Coord.-RTOC\" through [facility] upon arrival of the patient to [facility] for assignment of a hospitalist to perform admission. Reach out to the assigned hospitalist (assigned by RTOC) for any questions or concerns regarding the care of this patient.        "

## 2024-09-03 NOTE — PROGRESS NOTES
4 Eyes Skin Assessment Completed by CHELSEA Ospina and CHELSEA Cook.    Head WDL  Ears WDL  Nose WDL  Mouth WDL  Neck WDL  Breast/Chest WDL  Shoulder Blades WDL  Spine WDL  (R) Arm/Elbow/Hand WDL  (L) Arm/Elbow/Hand WDL  Abdomen distended  Groin WDL  Scrotum/Coccyx/Buttocks WDL  (R) Leg Scab  (L) Leg Scab  (R) Heel/Foot/Toe WDL  (L) Heel/Foot/Toe WDL          Devices In Places none      Interventions In Place Pillows    Possible Skin Injury No    Pictures Uploaded Into Epic N/A  Wound Consult Placed N/A  RN Wound Prevention Protocol Ordered No

## 2024-09-03 NOTE — CARE PLAN
The patient is Stable - Low risk of patient condition declining or worsening    Shift Goals  Clinical Goals: pain control to comfort level  Patient Goals: pain control, rest  Family Goals: GLORY    Progress made toward(s) clinical / shift goals:        Problem: Knowledge Deficit - Standard  Goal: Patient and family/care givers will demonstrate understanding of plan of care, disease process/condition, diagnostic tests and medications  Outcome: Progressing     Problem: Pain - Standard  Goal: Alleviation of pain or a reduction in pain to the patient’s comfort goal  Outcome: Progressing       Patient is not progressing towards the following goals:

## 2024-09-03 NOTE — ASSESSMENT & PLAN NOTE
Start on aldactone and lasix   Check hepatitis panel, ammonia   IR consult for paracentesis     Post paracentesis  GI consulted  Follow-up autoimmune hepatitis workup    Ceruloplasmin is low, started on ceruloplasmin urine collection  Discussed with GI  Autoimmune hepatitis workup negative so far    Finishing urine collection today

## 2024-09-03 NOTE — PROGRESS NOTES
GI APRN INTERIM NOTE    Called both [facility] and [facility]. Neither group has any records of patient being seen by their providers.
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
## 2024-09-03 NOTE — PROGRESS NOTES
Please see H&P by Dr. Painting for specific information  Discussed with GI EGD in a.m.  Follow-up CBC CMP in a.m.  Follow-up acute hepatitis panel  Autoimmune hepatitis panel  I have ordered a paracentesis discussed with IR team.
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
## 2024-09-04 ENCOUNTER — ANESTHESIA (OUTPATIENT)
Dept: SURGERY | Facility: MEDICAL CENTER | Age: 29
DRG: 433 | End: 2024-09-04
Payer: MEDICAID

## 2024-09-04 PROBLEM — K21.9 GASTROESOPHAGEAL REFLUX DISEASE: Status: ACTIVE | Noted: 2024-09-04

## 2024-09-04 PROBLEM — D64.9 ANEMIA: Status: ACTIVE | Noted: 2024-09-04

## 2024-09-04 PROBLEM — R18.8 ASCITES: Status: ACTIVE | Noted: 2024-09-04

## 2024-09-04 PROBLEM — D68.9 COAGULOPATHY (HCC): Status: ACTIVE | Noted: 2024-09-04

## 2024-09-04 LAB
ALBUMIN SERPL BCP-MCNC: 2.6 G/DL (ref 3.2–4.9)
ALBUMIN/GLOB SERPL: 0.9 G/DL
ALP SERPL-CCNC: 123 U/L (ref 30–99)
ALT SERPL-CCNC: 24 U/L (ref 2–50)
ANION GAP SERPL CALC-SCNC: 11 MMOL/L (ref 7–16)
AST SERPL-CCNC: 80 U/L (ref 12–45)
BILIRUB SERPL-MCNC: 1.4 MG/DL (ref 0.1–1.5)
BUN SERPL-MCNC: 3 MG/DL (ref 8–22)
CALCIUM ALBUM COR SERPL-MCNC: 9 MG/DL (ref 8.5–10.5)
CALCIUM SERPL-MCNC: 7.9 MG/DL (ref 8.5–10.5)
CHLORIDE SERPL-SCNC: 104 MMOL/L (ref 96–112)
CHOLEST SERPL-MCNC: 84 MG/DL (ref 100–199)
CO2 SERPL-SCNC: 24 MMOL/L (ref 20–33)
CREAT SERPL-MCNC: 0.32 MG/DL (ref 0.5–1.4)
ERYTHROCYTE [DISTWIDTH] IN BLOOD BY AUTOMATED COUNT: 92.3 FL (ref 35.9–50)
GFR SERPLBLD CREATININE-BSD FMLA CKD-EPI: 143 ML/MIN/1.73 M 2
GLOBULIN SER CALC-MCNC: 2.9 G/DL (ref 1.9–3.5)
GLUCOSE SERPL-MCNC: 96 MG/DL (ref 65–99)
HCT VFR BLD AUTO: 27.4 % (ref 37–47)
HDLC SERPL-MCNC: 46 MG/DL
HGB BLD-MCNC: 9.1 G/DL (ref 12–16)
INR PPP: 1.39 (ref 0.87–1.13)
LDLC SERPL CALC-MCNC: 25 MG/DL
MCH RBC QN AUTO: 40.4 PG (ref 27–33)
MCHC RBC AUTO-ENTMCNC: 33.2 G/DL (ref 32.2–35.5)
MCV RBC AUTO: 121.8 FL (ref 81.4–97.8)
PLATELET # BLD AUTO: 312 K/UL (ref 164–446)
PMV BLD AUTO: 10 FL (ref 9–12.9)
POTASSIUM SERPL-SCNC: 3.8 MMOL/L (ref 3.6–5.5)
PROT SERPL-MCNC: 5.5 G/DL (ref 6–8.2)
PROTHROMBIN TIME: 17.3 SEC (ref 12–14.6)
RBC # BLD AUTO: 2.25 M/UL (ref 4.2–5.4)
SODIUM SERPL-SCNC: 139 MMOL/L (ref 135–145)
TRANSFERRIN SERPL-MCNC: 116 MG/DL (ref 200–370)
TRIGL SERPL-MCNC: 63 MG/DL (ref 0–149)
WBC # BLD AUTO: 4.2 K/UL (ref 4.8–10.8)

## 2024-09-04 PROCEDURE — 99233 SBSQ HOSP IP/OBS HIGH 50: CPT | Performed by: HOSPITALIST

## 2024-09-04 PROCEDURE — 0DJD8ZZ INSPECTION OF LOWER INTESTINAL TRACT, VIA NATURAL OR ARTIFICIAL OPENING ENDOSCOPIC: ICD-10-PCS | Performed by: INTERNAL MEDICINE

## 2024-09-04 PROCEDURE — 160207 HCHG ENDO MINUTES - EA ADDL 1 MIN LEVEL 3: Performed by: INTERNAL MEDICINE

## 2024-09-04 PROCEDURE — 700105 HCHG RX REV CODE 258

## 2024-09-04 PROCEDURE — 160202 HCHG ENDO MINUTES - 1ST 30 MINS LEVEL 3: Performed by: INTERNAL MEDICINE

## 2024-09-04 PROCEDURE — 43235 EGD DIAGNOSTIC BRUSH WASH: CPT | Performed by: INTERNAL MEDICINE

## 2024-09-04 PROCEDURE — 160009 HCHG ANES TIME/MIN: Performed by: INTERNAL MEDICINE

## 2024-09-04 PROCEDURE — 85027 COMPLETE CBC AUTOMATED: CPT

## 2024-09-04 PROCEDURE — 160002 HCHG RECOVERY MINUTES (STAT): Performed by: INTERNAL MEDICINE

## 2024-09-04 PROCEDURE — 85610 PROTHROMBIN TIME: CPT

## 2024-09-04 PROCEDURE — 160035 HCHG PACU - 1ST 60 MINS PHASE I: Performed by: INTERNAL MEDICINE

## 2024-09-04 PROCEDURE — 770006 HCHG ROOM/CARE - MED/SURG/GYN SEMI*

## 2024-09-04 PROCEDURE — 45378 DIAGNOSTIC COLONOSCOPY: CPT | Performed by: INTERNAL MEDICINE

## 2024-09-04 PROCEDURE — 700102 HCHG RX REV CODE 250 W/ 637 OVERRIDE(OP): Performed by: HOSPITALIST

## 2024-09-04 PROCEDURE — 80053 COMPREHEN METABOLIC PANEL: CPT

## 2024-09-04 PROCEDURE — 80061 LIPID PANEL: CPT

## 2024-09-04 PROCEDURE — 700105 HCHG RX REV CODE 258: Performed by: ANESTHESIOLOGY

## 2024-09-04 PROCEDURE — 0DJ68ZZ INSPECTION OF STOMACH, VIA NATURAL OR ARTIFICIAL OPENING ENDOSCOPIC: ICD-10-PCS | Performed by: INTERNAL MEDICINE

## 2024-09-04 PROCEDURE — 36415 COLL VENOUS BLD VENIPUNCTURE: CPT

## 2024-09-04 PROCEDURE — 700111 HCHG RX REV CODE 636 W/ 250 OVERRIDE (IP): Performed by: ANESTHESIOLOGY

## 2024-09-04 PROCEDURE — 84466 ASSAY OF TRANSFERRIN: CPT

## 2024-09-04 PROCEDURE — 700101 HCHG RX REV CODE 250: Performed by: ANESTHESIOLOGY

## 2024-09-04 PROCEDURE — A9270 NON-COVERED ITEM OR SERVICE: HCPCS | Performed by: HOSPITALIST

## 2024-09-04 PROCEDURE — 700105 HCHG RX REV CODE 258: Performed by: HOSPITALIST

## 2024-09-04 PROCEDURE — 700111 HCHG RX REV CODE 636 W/ 250 OVERRIDE (IP): Mod: JZ | Performed by: HOSPITALIST

## 2024-09-04 PROCEDURE — 160048 HCHG OR STATISTICAL LEVEL 1-5: Performed by: INTERNAL MEDICINE

## 2024-09-04 RX ORDER — M-VIT,TX,IRON,MINS/CALC/FOLIC 27MG-0.4MG
1 TABLET ORAL DAILY
Status: DISCONTINUED | OUTPATIENT
Start: 2024-09-04 | End: 2024-09-07 | Stop reason: HOSPADM

## 2024-09-04 RX ORDER — SODIUM CHLORIDE, SODIUM LACTATE, POTASSIUM CHLORIDE, CALCIUM CHLORIDE 600; 310; 30; 20 MG/100ML; MG/100ML; MG/100ML; MG/100ML
INJECTION, SOLUTION INTRAVENOUS CONTINUOUS
Status: DISCONTINUED | OUTPATIENT
Start: 2024-09-04 | End: 2024-09-04 | Stop reason: HOSPADM

## 2024-09-04 RX ORDER — SODIUM CHLORIDE, SODIUM LACTATE, POTASSIUM CHLORIDE, AND CALCIUM CHLORIDE .6; .31; .03; .02 G/100ML; G/100ML; G/100ML; G/100ML
250 INJECTION, SOLUTION INTRAVENOUS ONCE
Status: COMPLETED | OUTPATIENT
Start: 2024-09-04 | End: 2024-09-04

## 2024-09-04 RX ORDER — DIPHENHYDRAMINE HYDROCHLORIDE 50 MG/ML
12.5 INJECTION INTRAMUSCULAR; INTRAVENOUS
Status: DISCONTINUED | OUTPATIENT
Start: 2024-09-04 | End: 2024-09-04 | Stop reason: HOSPADM

## 2024-09-04 RX ORDER — FERROUS SULFATE 325(65) MG
325 TABLET ORAL
Status: DISCONTINUED | OUTPATIENT
Start: 2024-09-05 | End: 2024-09-07 | Stop reason: HOSPADM

## 2024-09-04 RX ORDER — LABETALOL HYDROCHLORIDE 5 MG/ML
5 INJECTION, SOLUTION INTRAVENOUS
Status: DISCONTINUED | OUTPATIENT
Start: 2024-09-04 | End: 2024-09-04 | Stop reason: HOSPADM

## 2024-09-04 RX ORDER — OXYCODONE HYDROCHLORIDE 5 MG/1
5 TABLET ORAL EVERY 4 HOURS PRN
Status: DISCONTINUED | OUTPATIENT
Start: 2024-09-04 | End: 2024-09-07 | Stop reason: HOSPADM

## 2024-09-04 RX ORDER — GAUZE BANDAGE 2" X 2"
100 BANDAGE TOPICAL DAILY
Status: DISCONTINUED | OUTPATIENT
Start: 2024-09-04 | End: 2024-09-07 | Stop reason: HOSPADM

## 2024-09-04 RX ORDER — SODIUM CHLORIDE, SODIUM LACTATE, POTASSIUM CHLORIDE, CALCIUM CHLORIDE 600; 310; 30; 20 MG/100ML; MG/100ML; MG/100ML; MG/100ML
INJECTION, SOLUTION INTRAVENOUS
Status: DISCONTINUED | OUTPATIENT
Start: 2024-09-04 | End: 2024-09-04 | Stop reason: SURG

## 2024-09-04 RX ORDER — FOLIC ACID 1 MG/1
1 TABLET ORAL DAILY
Status: DISCONTINUED | OUTPATIENT
Start: 2024-09-04 | End: 2024-09-07 | Stop reason: HOSPADM

## 2024-09-04 RX ORDER — HALOPERIDOL 5 MG/ML
1 INJECTION INTRAMUSCULAR
Status: DISCONTINUED | OUTPATIENT
Start: 2024-09-04 | End: 2024-09-04 | Stop reason: HOSPADM

## 2024-09-04 RX ORDER — OXYCODONE HYDROCHLORIDE 10 MG/1
10 TABLET ORAL EVERY 4 HOURS PRN
Status: DISCONTINUED | OUTPATIENT
Start: 2024-09-04 | End: 2024-09-07 | Stop reason: HOSPADM

## 2024-09-04 RX ORDER — HYDRALAZINE HYDROCHLORIDE 20 MG/ML
5 INJECTION INTRAMUSCULAR; INTRAVENOUS
Status: DISCONTINUED | OUTPATIENT
Start: 2024-09-04 | End: 2024-09-04 | Stop reason: HOSPADM

## 2024-09-04 RX ORDER — ALBUTEROL SULFATE 5 MG/ML
2.5 SOLUTION RESPIRATORY (INHALATION)
Status: DISCONTINUED | OUTPATIENT
Start: 2024-09-04 | End: 2024-09-04 | Stop reason: HOSPADM

## 2024-09-04 RX ORDER — ONDANSETRON 2 MG/ML
4 INJECTION INTRAMUSCULAR; INTRAVENOUS
Status: DISCONTINUED | OUTPATIENT
Start: 2024-09-04 | End: 2024-09-04 | Stop reason: HOSPADM

## 2024-09-04 RX ORDER — LIDOCAINE HYDROCHLORIDE 20 MG/ML
INJECTION, SOLUTION EPIDURAL; INFILTRATION; INTRACAUDAL; PERINEURAL PRN
Status: DISCONTINUED | OUTPATIENT
Start: 2024-09-04 | End: 2024-09-04 | Stop reason: SURG

## 2024-09-04 RX ORDER — EPHEDRINE SULFATE 50 MG/ML
5 INJECTION, SOLUTION INTRAVENOUS
Status: DISCONTINUED | OUTPATIENT
Start: 2024-09-04 | End: 2024-09-04 | Stop reason: HOSPADM

## 2024-09-04 RX ADMIN — PROPOFOL 30 MG: 10 INJECTION, EMULSION INTRAVENOUS at 07:47

## 2024-09-04 RX ADMIN — OXYCODONE HYDROCHLORIDE 10 MG: 10 TABLET ORAL at 12:28

## 2024-09-04 RX ADMIN — PROPOFOL 30 MG: 10 INJECTION, EMULSION INTRAVENOUS at 07:53

## 2024-09-04 RX ADMIN — SODIUM CHLORIDE, POTASSIUM CHLORIDE, SODIUM LACTATE AND CALCIUM CHLORIDE 250 ML: 600; 310; 30; 20 INJECTION, SOLUTION INTRAVENOUS at 05:54

## 2024-09-04 RX ADMIN — PROPOFOL 30 MG: 10 INJECTION, EMULSION INTRAVENOUS at 07:49

## 2024-09-04 RX ADMIN — LIDOCAINE HYDROCHLORIDE 60 MG: 20 INJECTION, SOLUTION EPIDURAL; INFILTRATION; INTRACAUDAL at 07:42

## 2024-09-04 RX ADMIN — PROPOFOL 20 MG: 10 INJECTION, EMULSION INTRAVENOUS at 07:45

## 2024-09-04 RX ADMIN — PROPOFOL 40 MG: 10 INJECTION, EMULSION INTRAVENOUS at 07:44

## 2024-09-04 RX ADMIN — Medication 100 MG: at 09:56

## 2024-09-04 RX ADMIN — OXYCODONE HYDROCHLORIDE 10 MG: 10 TABLET ORAL at 23:15

## 2024-09-04 RX ADMIN — PROPOFOL 30 MG: 10 INJECTION, EMULSION INTRAVENOUS at 07:51

## 2024-09-04 RX ADMIN — Medication 1 TABLET: at 09:56

## 2024-09-04 RX ADMIN — SODIUM CHLORIDE, POTASSIUM CHLORIDE, SODIUM LACTATE AND CALCIUM CHLORIDE: 600; 310; 30; 20 INJECTION, SOLUTION INTRAVENOUS at 07:30

## 2024-09-04 RX ADMIN — FOLIC ACID 1 MG: 1 TABLET ORAL at 09:56

## 2024-09-04 RX ADMIN — PROPOFOL 100 MG: 10 INJECTION, EMULSION INTRAVENOUS at 07:42

## 2024-09-04 RX ADMIN — PHYTONADIONE 5 MG: 10 INJECTION, EMULSION INTRAMUSCULAR; INTRAVENOUS; SUBCUTANEOUS at 12:49

## 2024-09-04 RX ADMIN — OXYCODONE HYDROCHLORIDE 10 MG: 10 TABLET ORAL at 18:17

## 2024-09-04 ASSESSMENT — COGNITIVE AND FUNCTIONAL STATUS - GENERAL
DAILY ACTIVITIY SCORE: 24
SUGGESTED CMS G CODE MODIFIER MOBILITY: CH
MOBILITY SCORE: 24
SUGGESTED CMS G CODE MODIFIER DAILY ACTIVITY: CH

## 2024-09-04 ASSESSMENT — ENCOUNTER SYMPTOMS
PALPITATIONS: 0
BRUISES/BLEEDS EASILY: 0
BLURRED VISION: 0
DOUBLE VISION: 0
COUGH: 0
WHEEZING: 0
HEADACHES: 0
VOMITING: 0
HEMOPTYSIS: 0
CLAUDICATION: 0
PND: 0
NAUSEA: 0
BACK PAIN: 0
DEPRESSION: 0
HEARTBURN: 0
CHILLS: 0
ABDOMINAL PAIN: 1
DIZZINESS: 0
MYALGIAS: 0
FEVER: 0

## 2024-09-04 ASSESSMENT — PAIN DESCRIPTION - PAIN TYPE
TYPE: SURGICAL PAIN
TYPE: SURGICAL PAIN
TYPE: ACUTE PAIN
TYPE: SURGICAL PAIN
TYPE: SURGICAL PAIN

## 2024-09-04 NOTE — PROGRESS NOTES
Pt reports pain 2/10, unhooked from IV, questions answered, and taken down to pre-op with transport.

## 2024-09-04 NOTE — PROGRESS NOTES
Hospital Medicine Daily Progress Note    Date of Service  9/4/2024    Chief Complaint  Linda Carpenter is a 28 y.o. female admitted 9/2/2024 with cirrhosis    Hospital Course  Linda Carpenter is a 28 y.o. female who presented 9/2/2024 with abdominal distention and pain for few weeks with associated nausea and vomiting.  Patient reports occasionally drinking alcohol.  Denies any chest pain, fever, chills.  In the ER at outside facility CT abdomen pelvis showed hepatic steatosis, portal hypertension, gallbladder distended of unclear etiology suggestion of wall thickening distal stomach raising concern for gastritis.  Patient transferred here for GI evaluation.     Interval Problem Update  9/4 patient is resting in bed, she had EGD colonoscopy today no active bleeding, iron levels are low, will start her iron supplementation, note from GI reviewed, follow-up autoimmune hepatitis panel, discussed with bedside nurse charge nurse , patient's blood pressure is borderline low, will continue close monitoring, no signs of active bleeding, patient tolerating diet.  Patient is alert oriented follows commands she is able to express understanding and she is able to speak in full sentences all question have been answered.    I have discussed this patient's plan of care and discharge plan at IDT rounds today with Case Management, Nursing, Nursing leadership, and other members of the IDT team.    Consultants/Specialty  EGD/colonoscopy    Code Status  Full Code    Disposition  The patient is not medically cleared for discharge to home or a post-acute facility.      I have placed the appropriate orders for post-discharge needs.    Review of Systems  Review of Systems   Constitutional:  Negative for chills and fever.   Eyes:  Negative for blurred vision and double vision.   Respiratory:  Negative for cough, hemoptysis and wheezing.    Cardiovascular:  Negative for chest pain, palpitations, claudication, leg  swelling and PND.   Gastrointestinal:  Positive for abdominal pain. Negative for heartburn, nausea and vomiting.   Genitourinary:  Negative for hematuria and urgency.   Musculoskeletal:  Negative for back pain and myalgias.   Skin:  Negative for rash.   Neurological:  Negative for dizziness and headaches.   Endo/Heme/Allergies:  Does not bruise/bleed easily.   Psychiatric/Behavioral:  Negative for depression.         Physical Exam  Temp:  [35.8 °C (96.5 °F)-36.5 °C (97.7 °F)] 35.8 °C (96.5 °F)  Pulse:  [60-97] 80  Resp:  [12-18] 17  BP: ()/(49-73) 97/60  SpO2:  [92 %-100 %] 98 %    Physical Exam  Vitals and nursing note reviewed.   Constitutional:       Appearance: Normal appearance.   Eyes:      General: No scleral icterus.        Right eye: No discharge.         Left eye: No discharge.   Cardiovascular:      Rate and Rhythm: Normal rate and regular rhythm.      Pulses: Normal pulses.      Heart sounds: Normal heart sounds.   Pulmonary:      Effort: Pulmonary effort is normal. No respiratory distress.   Abdominal:      General: Abdomen is flat. Bowel sounds are normal. There is no distension.      Palpations: There is no mass.      Tenderness: There is abdominal tenderness (Mild).   Musculoskeletal:         General: Normal range of motion.      Cervical back: Normal range of motion and neck supple.   Skin:     Coloration: Skin is pale.   Neurological:      General: No focal deficit present.      Mental Status: She is alert and oriented to person, place, and time.   Psychiatric:         Mood and Affect: Mood normal.         Fluids    Intake/Output Summary (Last 24 hours) at 9/4/2024 1604  Last data filed at 9/4/2024 1427  Gross per 24 hour   Intake 260 ml   Output --   Net 260 ml        Laboratory  Recent Labs     09/03/24  0753 09/04/24  1034   WBC 5.1 4.2*   RBC 2.12* 2.25*   HEMOGLOBIN 8.7* 9.1*   HEMATOCRIT 25.8* 27.4*   .7* 121.8*   MCH 41.0* 40.4*   MCHC 33.7 33.2   RDW 91.8* 92.3*   PLATELETCT  298 312   MPV 9.6 10.0     Recent Labs     09/03/24  0753 09/04/24  1034   SODIUM 142 139   POTASSIUM 3.8 3.8   CHLORIDE 107 104   CO2 28 24   GLUCOSE 67 96   BUN 5* 3*   CREATININE 0.44* 0.32*   CALCIUM 7.4* 7.9*     Recent Labs     09/04/24  1034   INR 1.39*         Recent Labs     09/04/24  1034   TRIGLYCERIDE 63   HDL 46   LDL 25       Imaging  US-LIVER AND VESSELS COMPLETE (COMBO)   Final Result      1.  Cirrhosis with portal hypertension      2.  Patent hepatic and portal veins      3.  Ascites      US-PARACENTESIS, ABD WITH IMAGING   Final Result      1. Ultrasound-guided therapeutic and diagnostic paracentesis of the right lower quadrant of the abdominal wall.      2. 580 mL of fluid withdrawn.      US-RUQ   Final Result         1.  Hepatomegaly   2.  Echogenic liver with irregular contour, compatible with changes of cirrhosis   3.  Gallbladder sludge   4.  Perihepatic ascites           Assessment/Plan  * Cirrhosis (HCC)- (present on admission)  Assessment & Plan  Start on aldactone and lasix   Check hepatitis panel, ammonia   IR consult for paracentesis     Post paracentesis  GI consulted  Follow-up autoimmune hepatitis workup      Coagulopathy (HCC)  Assessment & Plan  INR 1.39  IV vitamin K ordered, monitoring for side effects include but not limited to allergic reaction.  Follow-up INR in a.m.    Ascites- (present on admission)  Assessment & Plan  Status post paracentesis no signs of SBP    Anemia- (present on admission)  Assessment & Plan  Iron levels are low  Will probably require IV iron  EGD colonoscopy today no signs of active bleeding    Gastroesophageal reflux disease- (present on admission)  Assessment & Plan  Continue PPI         VTE prophylaxis: scd    I have performed a physical exam and reviewed and updated ROS and Plan today (9/4/2024). In review of yesterday's note (9/3/2024), there are no changes except as documented above.      Total time of 53 minutes spent prepping to see patient (e.g.  reviewing  tests/imaging results, notes from consultants, bedside nurse, night shift ) obtaining and/or reviewing separately obtained history. Performing a medically appropriate examination and evaluation.  Counseling and educating the patient.  Ordering medications, tests, or procedures.  Referring and communicating with other health care professionals.  Documenting clinical information in EPIC.  Independently interpreting results and communicating results to patient.  Care coordination.

## 2024-09-04 NOTE — ANESTHESIA POSTPROCEDURE EVALUATION
Patient: Linda Carpenter    Procedure Summary       Date: 09/04/24 Room / Location: [facility] ROOM 26 / SURGERY SAME DAY [facility]    Anesthesia Start: 0730 Anesthesia Stop: 0805    Procedures:       GASTROSCOPY (Esophagus)      COLONOSCOPY (Anus) Diagnosis: (Portal Hypertensive Gastropathy, Normal Colon, External Hemorrhoids)    Surgeons: Giuliano Fountain M.D. Responsible Provider: Mary Grace Schmidt M.D.    Anesthesia Type: MAC ASA Status: 3            Final Anesthesia Type: MAC  Last vitals  BP   Blood Pressure: 90/58    Temp   36.3 °C (97.3 °F)    Pulse   77   Resp   16    SpO2   93 %      Anesthesia Post Evaluation    Patient location during evaluation: PACU  Patient participation: complete - patient participated  Level of consciousness: awake and alert    Airway patency: patent  Anesthetic complications: no  Cardiovascular status: hemodynamically stable  Respiratory status: acceptable  Hydration status: euvolemic    PONV: none          No notable events documented.     Nurse Pain Score: 0 (NPRS)

## 2024-09-04 NOTE — INTERVAL H&P NOTE
Patient seen and evaluated preoperatively.  The risk, benefits, and alternatives were discussed in detail. Risks include bowel perforation, procedure related bleeding event, infection, inability to safely complete the exam, sedation related complications. The patient, understanding the discussion, consents to proceed forward.  Plan bidirectional endoscopy.

## 2024-09-04 NOTE — CARE PLAN
The patient is Stable - Low risk of patient condition declining or worsening    Shift Goals  Clinical Goals: Pt will remain free from falls, report decreased pain after admin of PRN meds, update POC  Patient Goals: rest  Family Goals: GLORY    Progress made toward(s) clinical / shift goals:    Pt remained free from falls throughout shift. Bed is locked and in lowest position. Call light and personal belongings are within reach.  Pt was updated on plan of care by bedside RN and MD.  Pt reported decreased pain with the use of pain medication throughout shift.      Problem: Pain - Standard  Goal: Alleviation of pain or a reduction in pain to the patient’s comfort goal  Outcome: Progressing     Problem: Depression  Goal: Patient and family/caregiver will verbalize accurate information about at least two of the possible causes of depression, three-four of the signs and symptoms of depression  Outcome: Progressing     Problem: Bowel Elimination  Goal: Establish and maintain regular bowel function  Outcome: Progressing     Problem: Gastrointestinal Irritability  Goal: Nausea and vomiting will be absent or improve  Outcome: Progressing       Patient is not progressing towards the following goals:

## 2024-09-04 NOTE — HOSPITAL COURSE
Linda Carpenter is a 28 y.o. female who presented 9/2/2024 with abdominal distention and pain for few weeks with associated nausea and vomiting.  Patient reports occasionally drinking alcohol.  Denies any chest pain, fever, chills.  In the ER at outside facility CT abdomen pelvis showed hepatic steatosis, portal hypertension, gallbladder distended of unclear etiology suggestion of wall thickening distal stomach raising concern for gastritis.  Patient transferred here for GI evaluation.

## 2024-09-04 NOTE — OP REPORT
PreOp Diagnosis: Anemia, portal hypertension, probable cirrhosis, hematochezia      PostOp Diagnosis:   -Mild portal hypertensive gastropathy  -External hemorrhoids      Procedure(s):  GASTROSCOPY - Wound Class: Clean Contaminated  COLONOSCOPY - Wound Class: Clean Contaminated    Surgeon(s):  Giuliano Fountain M.D.    Anesthesiologist/Type of Anesthesia:  Anesthesiologist: Mary Grace Schmidt M.D./BULL    Surgical Staff:  Circulator: Jane Spivey R.N.  Endoscopy Technician: Najma Pederson  Endoscopy Nurse: Papo Brantley R.N.    Specimens removed if any:  * No specimens in log *      CONSENT: The risks, benefits and alternatives of the procedure were discussed in detail. The risks include and are not limited to bleeding, infection, perforation, missed lesions, and sedations risks (cardiopulmonary compromise and allergic reaction to medications).    DESCRIPTION:   The patient presented to the operating room.  A time out was performed prior to beginning the procedure.   The patient was placed in the left lateral position.   Patient was sedated by anesthesia: Propofol.    OPERATIVE FINDINGS:    Esophagus: Normal.  No varices.  Stomach: Mild portal hypertensive gastropathy predominantly in the body of the stomach.  No gastric varices on retroflexion.  Duodenum: Normal to the second portion.  Patient repositioned.  Digital rectal examination shows mild external hemorrhoids.  Endoscope advanced to the terminal ileum.  Withdrawal time 6 minutes.  [place] prep score:   Right colon: 3; Transverse colon: 3; Left Colon: 3. BPS score: 9  Normal terminal ileum.  Normal colonoscopy to include retroflexion in the rectum.    Blood loss: None    The patient tolerated the procedure well.      There were no immediate complications.    IMPRESSION:  Mild portal hypertensive gastropathy otherwise normal upper endoscopy.  External hemorrhoids.  Bidirectional endoscopy otherwise normal.    RECOMMENDATIONS:  2 g sodium restriction  diet.  Return patient to hospital [facility] for continued care.  GI will follow-up.  Patient needs outpatient GI follow-up with hepatology.

## 2024-09-04 NOTE — ANESTHESIA PREPROCEDURE EVALUATION
Case: [id number] Date/Time: 09/04/24 0715    Procedures:       GASTROSCOPY (Esophagus)      COLONOSCOPY (Anus)    Anesthesia type: MAC    Pre-op diagnosis: anemia, melena, new onset cirrhosis    Location: [facility] ROOM 26 / SURGERY SAME DAY [facility]    Surgeons: Giuliano Fountain M.D.            Relevant Problems   ANESTHESIA (within normal limits)      PULMONARY (within normal limits)      CARDIAC (within normal limits)      GI   (positive) Gastroesophageal reflux disease         (positive) Cirrhosis (HCC)      ENDO (within normal limits)       Physical Exam    Airway   Mallampati: II  TM distance: >3 FB  Neck ROM: full       Cardiovascular - normal exam  Rhythm: regular  Rate: normal  (-) murmur     Dental - normal exam           Pulmonary - normal exam  Breath sounds clear to auscultation     Abdominal    Neurological - normal exam                   Anesthesia Plan    ASA 3 (cirrhosis)       Plan - MAC               Induction: intravenous    Postoperative Plan: Postoperative administration of opioids is intended.    Pertinent diagnostic labs and testing reviewed    Informed Consent:    Anesthetic plan and risks discussed with patient.    Use of blood products discussed with: patient whom consented to blood products.

## 2024-09-04 NOTE — ANESTHESIA TIME REPORT
Anesthesia Start and Stop Event Times       Date Time Event    9/4/2024 0725 Ready for Procedure     0730 Anesthesia Start     0805 Anesthesia Stop          Responsible Staff  09/04/24      Name Role Begin End    Mary Grace Schmidt M.D. Anesth 0730 0805          Overtime Reason:  no overtime (within assigned shift)    Comments:

## 2024-09-04 NOTE — ASSESSMENT & PLAN NOTE
Iron levels are low  Started oral iron  EGD colonoscopy today no signs of active bleeding  Hemoglobin 8.0 today  MCV 1 22  Low folic acid level started on folic acid replacement

## 2024-09-04 NOTE — PROGRESS NOTES
0800: Assumed care of patient at 0700. Received report from night shift RN. Pt is A&O x 4, on RA. Reporting a pain level of 4/10. Assessment completed and VSS. Bowel sounds are active in all 4 quadrants, abdomen is distended. Lung sounds are clear. Pt strength is 5/5 in all extremities. Call light within reach and bed is locked and in lowest position. Plan of care discussed and patient does not have any further needs at this time.

## 2024-09-04 NOTE — OR NURSING
0802- Pt to PACU from Endo. Report from anesthesia and Endo RN. SpO2 stable on room air. Respirations even and unlabored. VSS. Pt declines pain and nausea    0815- Dr. Fountain at bedside. Ok per provider for patient to have 2g sodium restriction diet    0835- report to A[facility] TRAN on s[facility] 5    0852- pt transported to [facility] with pt transport. Pt did not bring any belongings down to procedure.

## 2024-09-05 LAB
ALBUMIN SERPL BCP-MCNC: 2.3 G/DL (ref 3.2–4.9)
ALBUMIN/GLOB SERPL: 0.9 G/DL
ALP SERPL-CCNC: 104 U/L (ref 30–99)
ALT SERPL-CCNC: 20 U/L (ref 2–50)
ANION GAP SERPL CALC-SCNC: 8 MMOL/L (ref 7–16)
AST SERPL-CCNC: 57 U/L (ref 12–45)
BILIRUB SERPL-MCNC: 1.1 MG/DL (ref 0.1–1.5)
BUN SERPL-MCNC: 2 MG/DL (ref 8–22)
CALCIUM ALBUM COR SERPL-MCNC: 9 MG/DL (ref 8.5–10.5)
CALCIUM SERPL-MCNC: 7.6 MG/DL (ref 8.5–10.5)
CERULOPLASMIN SERPL-MCNC: 12 MG/DL (ref 16–45)
CHLORIDE SERPL-SCNC: 106 MMOL/L (ref 96–112)
CO2 SERPL-SCNC: 25 MMOL/L (ref 20–33)
CREAT SERPL-MCNC: 0.35 MG/DL (ref 0.5–1.4)
ERYTHROCYTE [DISTWIDTH] IN BLOOD BY AUTOMATED COUNT: 90.4 FL (ref 35.9–50)
GFR SERPLBLD CREATININE-BSD FMLA CKD-EPI: 140 ML/MIN/1.73 M 2
GLOBULIN SER CALC-MCNC: 2.5 G/DL (ref 1.9–3.5)
GLUCOSE SERPL-MCNC: 77 MG/DL (ref 65–99)
HCT VFR BLD AUTO: 24.2 % (ref 37–47)
HGB BLD-MCNC: 8 G/DL (ref 12–16)
INR PPP: 1.45 (ref 0.87–1.13)
LDH SERPL L TO P-CCNC: 320 U/L (ref 107–266)
MAGNESIUM SERPL-MCNC: 1.6 MG/DL (ref 1.5–2.5)
MCH RBC QN AUTO: 40.4 PG (ref 27–33)
MCHC RBC AUTO-ENTMCNC: 33.1 G/DL (ref 32.2–35.5)
MCV RBC AUTO: 122.2 FL (ref 81.4–97.8)
MITOCHONDRIA M2 IGG SER-ACNC: 6 UNITS (ref 0–24.9)
NUCLEAR IGG SER QL IA: NORMAL
PHOSPHATE SERPL-MCNC: 4.4 MG/DL (ref 2.5–4.5)
PLATELET # BLD AUTO: 273 K/UL (ref 164–446)
PMV BLD AUTO: 10 FL (ref 9–12.9)
POTASSIUM SERPL-SCNC: 4 MMOL/L (ref 3.6–5.5)
PROT SERPL-MCNC: 4.8 G/DL (ref 6–8.2)
PROTHROMBIN TIME: 17.8 SEC (ref 12–14.6)
RBC # BLD AUTO: 1.98 M/UL (ref 4.2–5.4)
SMA IGG SER-ACNC: 9 UNITS (ref 0–19)
SODIUM SERPL-SCNC: 139 MMOL/L (ref 135–145)
WBC # BLD AUTO: 5.8 K/UL (ref 4.8–10.8)

## 2024-09-05 PROCEDURE — 82784 ASSAY IGA/IGD/IGG/IGM EACH: CPT

## 2024-09-05 PROCEDURE — 99233 SBSQ HOSP IP/OBS HIGH 50: CPT | Performed by: HOSPITALIST

## 2024-09-05 PROCEDURE — 36415 COLL VENOUS BLD VENIPUNCTURE: CPT

## 2024-09-05 PROCEDURE — 86258 DGP ANTIBODY EACH IG CLASS: CPT | Mod: 91

## 2024-09-05 PROCEDURE — 770006 HCHG ROOM/CARE - MED/SURG/GYN SEMI*

## 2024-09-05 PROCEDURE — 700102 HCHG RX REV CODE 250 W/ 637 OVERRIDE(OP): Performed by: HOSPITALIST

## 2024-09-05 PROCEDURE — A9270 NON-COVERED ITEM OR SERVICE: HCPCS | Performed by: STUDENT IN AN ORGANIZED HEALTH CARE EDUCATION/TRAINING PROGRAM

## 2024-09-05 PROCEDURE — 83735 ASSAY OF MAGNESIUM: CPT

## 2024-09-05 PROCEDURE — 83615 LACTATE (LD) (LDH) ENZYME: CPT

## 2024-09-05 PROCEDURE — 84100 ASSAY OF PHOSPHORUS: CPT

## 2024-09-05 PROCEDURE — 83010 ASSAY OF HAPTOGLOBIN QUANT: CPT

## 2024-09-05 PROCEDURE — 700102 HCHG RX REV CODE 250 W/ 637 OVERRIDE(OP): Performed by: STUDENT IN AN ORGANIZED HEALTH CARE EDUCATION/TRAINING PROGRAM

## 2024-09-05 PROCEDURE — 86364 TISS TRNSGLTMNASE EA IG CLAS: CPT | Mod: 91

## 2024-09-05 PROCEDURE — 85610 PROTHROMBIN TIME: CPT

## 2024-09-05 PROCEDURE — 85027 COMPLETE CBC AUTOMATED: CPT

## 2024-09-05 PROCEDURE — 700111 HCHG RX REV CODE 636 W/ 250 OVERRIDE (IP): Performed by: HOSPITALIST

## 2024-09-05 PROCEDURE — 80053 COMPREHEN METABOLIC PANEL: CPT

## 2024-09-05 PROCEDURE — 99232 SBSQ HOSP IP/OBS MODERATE 35: CPT | Performed by: NURSE PRACTITIONER

## 2024-09-05 PROCEDURE — A9270 NON-COVERED ITEM OR SERVICE: HCPCS | Performed by: HOSPITALIST

## 2024-09-05 PROCEDURE — 84590 ASSAY OF VITAMIN A: CPT

## 2024-09-05 RX ORDER — MAGNESIUM SULFATE HEPTAHYDRATE 40 MG/ML
2 INJECTION, SOLUTION INTRAVENOUS ONCE
Status: COMPLETED | OUTPATIENT
Start: 2024-09-05 | End: 2024-09-05

## 2024-09-05 RX ORDER — ESCITALOPRAM OXALATE 10 MG/1
10 TABLET ORAL DAILY
Status: DISCONTINUED | OUTPATIENT
Start: 2024-09-05 | End: 2024-09-07 | Stop reason: HOSPADM

## 2024-09-05 RX ADMIN — FOLIC ACID 1 MG: 1 TABLET ORAL at 05:01

## 2024-09-05 RX ADMIN — ESCITALOPRAM OXALATE 10 MG: 10 TABLET ORAL at 17:29

## 2024-09-05 RX ADMIN — OXYCODONE HYDROCHLORIDE 10 MG: 10 TABLET ORAL at 08:16

## 2024-09-05 RX ADMIN — FUROSEMIDE 20 MG: 20 TABLET ORAL at 05:00

## 2024-09-05 RX ADMIN — Medication 100 MG: at 05:00

## 2024-09-05 RX ADMIN — Medication 1 TABLET: at 05:00

## 2024-09-05 RX ADMIN — SPIRONOLACTONE 25 MG: 25 TABLET ORAL at 05:01

## 2024-09-05 RX ADMIN — OXYCODONE HYDROCHLORIDE 10 MG: 10 TABLET ORAL at 14:21

## 2024-09-05 RX ADMIN — MAGNESIUM SULFATE HEPTAHYDRATE 2 G: 2 INJECTION, SOLUTION INTRAVENOUS at 08:22

## 2024-09-05 RX ADMIN — FERROUS SULFATE TAB 325 MG (65 MG ELEMENTAL FE) 325 MG: 325 (65 FE) TAB at 08:17

## 2024-09-05 RX ADMIN — OXYCODONE HYDROCHLORIDE 10 MG: 10 TABLET ORAL at 19:27

## 2024-09-05 ASSESSMENT — ENCOUNTER SYMPTOMS
CLAUDICATION: 0
CHILLS: 0
HEMOPTYSIS: 0
BLURRED VISION: 0
PALPITATIONS: 0
HEARTBURN: 0
WEAKNESS: 0
BACK PAIN: 0
BRUISES/BLEEDS EASILY: 0
DIZZINESS: 0
DOUBLE VISION: 0
ABDOMINAL PAIN: 0
HEADACHES: 0
MYALGIAS: 0
DEPRESSION: 0
BLOOD IN STOOL: 0
FEVER: 0
SHORTNESS OF BREATH: 0
NAUSEA: 0
CONSTIPATION: 0
VOMITING: 0
WHEEZING: 0
COUGH: 0
ABDOMINAL PAIN: 1
NERVOUS/ANXIOUS: 1
PND: 0
DIARRHEA: 0

## 2024-09-05 ASSESSMENT — PAIN DESCRIPTION - PAIN TYPE
TYPE: ACUTE PAIN

## 2024-09-05 ASSESSMENT — LIFESTYLE VARIABLES: SUBSTANCE_ABUSE: 1

## 2024-09-05 NOTE — PROGRESS NOTES
Hospital Medicine Daily Progress Note    Date of Service  9/5/2024    Chief Complaint  Linda Carpenter is a 28 y.o. female admitted 9/2/2024 with cirrhosis    Hospital Course  Linda Carpenter is a 28 y.o. female who presented 9/2/2024 with abdominal distention and pain for few weeks with associated nausea and vomiting.  Patient reports occasionally drinking alcohol.  Denies any chest pain, fever, chills.  In the ER at outside facility CT abdomen pelvis showed hepatic steatosis, portal hypertension, gallbladder distended of unclear etiology suggestion of wall thickening distal stomach raising concern for gastritis.  Patient transferred here for GI evaluation.     Interval Problem Update  9/4 patient is resting in bed, she had EGD colonoscopy today no active bleeding, iron levels are low, will start her iron supplementation, note from GI reviewed, follow-up autoimmune hepatitis panel, discussed with bedside nurse charge nurse , patient's blood pressure is borderline low, will continue close monitoring, no signs of active bleeding, patient tolerating diet.  Patient is alert oriented follows commands she is able to express understanding and she is able to speak in full sentences all question have been answered.  9/5 patient is resting in bed, she is alert oriented follows commands, she had mild abdominal pain, she is able to urinate and having bowel movements, no focal weakness no numbness or tingling, discussed with GI will continue monitoring and waiting for cirrhosis workup, later today ceruloplasmin levels were low started on urinary collection, autoimmune hepatitis panel negative so far, discussed with bedside nurse charge nurse  pharmacist, all questions been answered.    I have discussed this patient's plan of care and discharge plan at IDT rounds today with Case Management, Nursing, Nursing leadership, and other members of the IDT team.    Consultants/Specialty  GI    Code  Status  Full Code    Disposition  The patient is not medically cleared for discharge to home or a post-acute facility.      I have placed the appropriate orders for post-discharge needs.    Review of Systems  Review of Systems   Constitutional:  Negative for chills and fever.   Eyes:  Negative for blurred vision and double vision.   Respiratory:  Negative for cough, hemoptysis and wheezing.    Cardiovascular:  Negative for chest pain, palpitations, claudication, leg swelling and PND.   Gastrointestinal:  Positive for abdominal pain. Negative for heartburn, nausea and vomiting.   Genitourinary:  Negative for hematuria and urgency.   Musculoskeletal:  Negative for back pain and myalgias.   Skin:  Negative for rash.   Neurological:  Negative for dizziness and headaches.   Endo/Heme/Allergies:  Does not bruise/bleed easily.   Psychiatric/Behavioral:  Negative for depression.         Physical Exam  Temp:  [35.7 °C (96.3 °F)-36.5 °C (97.7 °F)] 36.1 °C (97 °F)  Pulse:  [67-77] 71  Resp:  [17-18] 18  BP: ()/(57-66) 96/61  SpO2:  [90 %-94 %] 91 %    Physical Exam  Vitals and nursing note reviewed.   Constitutional:       Appearance: Normal appearance.   Eyes:      General: No scleral icterus.        Right eye: No discharge.         Left eye: No discharge.   Cardiovascular:      Rate and Rhythm: Normal rate and regular rhythm.      Pulses: Normal pulses.      Heart sounds: Normal heart sounds.   Pulmonary:      Effort: Pulmonary effort is normal. No respiratory distress.   Abdominal:      General: Abdomen is flat. Bowel sounds are normal. There is no distension.      Palpations: There is no mass.      Tenderness: There is abdominal tenderness (Mild).   Musculoskeletal:         General: Normal range of motion.      Cervical back: Normal range of motion and neck supple.   Skin:     Coloration: Skin is pale.   Neurological:      General: No focal deficit present.      Mental Status: She is alert and oriented to person,  place, and time.   Psychiatric:         Mood and Affect: Mood normal.         Fluids    Intake/Output Summary (Last 24 hours) at 9/5/2024 1644  Last data filed at 9/5/2024 1005  Gross per 24 hour   Intake 120 ml   Output --   Net 120 ml        Laboratory  Recent Labs     09/03/24  0753 09/04/24  1034 09/05/24  0312   WBC 5.1 4.2* 5.8   RBC 2.12* 2.25* 1.98*   HEMOGLOBIN 8.7* 9.1* 8.0*   HEMATOCRIT 25.8* 27.4* 24.2*   .7* 121.8* 122.2*   MCH 41.0* 40.4* 40.4*   MCHC 33.7 33.2 33.1   RDW 91.8* 92.3* 90.4*   PLATELETCT 298 312 273   MPV 9.6 10.0 10.0     Recent Labs     09/03/24  0753 09/04/24  1034 09/05/24  0312   SODIUM 142 139 139   POTASSIUM 3.8 3.8 4.0   CHLORIDE 107 104 106   CO2 28 24 25   GLUCOSE 67 96 77   BUN 5* 3* 2*   CREATININE 0.44* 0.32* 0.35*   CALCIUM 7.4* 7.9* 7.6*     Recent Labs     09/04/24  1034 09/05/24  0312   INR 1.39* 1.45*         Recent Labs     09/04/24  1034   TRIGLYCERIDE 63   HDL 46   LDL 25       Imaging  US-LIVER AND VESSELS COMPLETE (COMBO)   Final Result      1.  Cirrhosis with portal hypertension      2.  Patent hepatic and portal veins      3.  Ascites      US-PARACENTESIS, ABD WITH IMAGING   Final Result      1. Ultrasound-guided therapeutic and diagnostic paracentesis of the right lower quadrant of the abdominal wall.      2. 580 mL of fluid withdrawn.      US-RUQ   Final Result         1.  Hepatomegaly   2.  Echogenic liver with irregular contour, compatible with changes of cirrhosis   3.  Gallbladder sludge   4.  Perihepatic ascites           Assessment/Plan  * Cirrhosis (HCC)- (present on admission)  Assessment & Plan  Start on aldactone and lasix   Check hepatitis panel, ammonia   IR consult for paracentesis     Post paracentesis  GI consulted  Follow-up autoimmune hepatitis workup    Ceruloplasmin is low, started on ceruloplasmin urine collection  Discussed with GI  Autoimmune hepatitis workup negative so far        Coagulopathy (HCC)  Assessment & Plan  INR  1.45  Status post vitamin K  Follow-up INR in a.m.      Ascites- (present on admission)  Assessment & Plan  Status post paracentesis no signs of SBP    Anemia- (present on admission)  Assessment & Plan  Iron levels are low  Started oral iron  EGD colonoscopy today no signs of active bleeding  Hemoglobin 8.0 today  MCV 1 22  Low folic acid level started on folic acid replacement    Gastroesophageal reflux disease- (present on admission)  Assessment & Plan  Continue PPI         VTE prophylaxis: scd    I have performed a physical exam and reviewed and updated ROS and Plan today (9/5/2024). In review of yesterday's note (9/4/2024), there are no changes except as documented above.      Total time of 51 minutes spent prepping to see patient (e.g. reviewing  tests/imaging results, notes from consultants, bedside nurse, night shift ) obtaining and/or reviewing separately obtained history. Performing a medically appropriate examination and evaluation.  Counseling and educating the patient.  Ordering medications, tests, or procedures.  Referring and communicating with other health care professionals.  Documenting clinical information in EPIC.  Independently interpreting results and communicating results to patient.  Care coordination.

## 2024-09-05 NOTE — PROGRESS NOTES
..Gastroenterology Progress Note               Author:  Leann Alvarado, DNP,  APRN Date & Time Created: 2024 7:49 AM       Patient ID:  Name:             Linda Carpenter    YOB: 1995  Age:                 28 y.o.  female  MRN:               [id number]    Medical Decision Making, by Problem:  Active Hospital Problems    Diagnosis     Gastroesophageal reflux disease [K21.9]     Anemia [D64.9]     Ascites [R18.8]     Coagulopathy (HCC) [D68.9]     Cirrhosis (HCC) [K74.60]      Presenting Chief Complaint:  anemia, melena, new onset cirrhosis     History of Present Illness:   This is a 28 year old female with a past medical history of fatty liver disease, GERD ( onset, currently managed with pantoprazole) who was transferred from [facility] to [facility] 2024 with concern for acute cholecystitis and new onset decompensated cirrhosis. At outside facility, CT abdomen pelvis with findings including hepatic steatosis, portal hypertension, gallbladder distended of unclear etiology, suggestion of wall thickening distal stomach raising concern for gastritis, Large amount of ascites, recanalization of umbilical vein. No splenomegaly.  Hemoglobin 8.9, hematocrit 25, Potassium 3, Albumin 2.8, AST 81. MELD 3.0 score there 9. UA consistent for UTI.      This morning, labs WBC 5.1, Hgb 8.7, Hct 25.8, .7, plt 298. Na 142, K 3.8, BUN 5, Cr 0.44, AST 75, ALT 22, , tbili 0.7, Albumin 2.4.  Hepatitis panel negative. No coagulation studies. RUQ ultrasound showed hepatomegaly, echogenic liver with irregular contour, compatible with cirrhosis, gallbladder sludge, and perihepatic ascites. IVC is patent with hepatopetal flow, MPV 1.04cm. NO choledocholithiasis- CBD 3.9mm.     2024: EGD/colonoscopy:  Mild portal hypertensive gastropathy otherwise normal upper endoscopy.  External hemorrhoids.  Bidirectional endoscopy otherwise normal.    Interval  History:  9/5/2024: Patient seen and examined.  Endorses a 3-year history of occasional nausea, vomiting, tremors, and a 15 to 20 pound weight loss over the last 3 to 4 months alongside near constant diarrhea with right upper quadrant abdominal pain.  Also reports onset of tremors Had 2 liquid bowel movements overnight.  Tolerating her diet with no nausea currently.  Labs reviewed.    I had a zaira discussion with her about her newly diagnosed decompensated cirrhosis.  She was tearful and honestly admitted that she had been drinking a lot more than she previously reported.  She has been under a lot of stress lately but recognizes that she has a 9 and a 3-year-old that she wants to live for.  We talked at length about AA, resources to help her stay sober, cirrhotic management, and how to manage the side effects of portal hypertension including ascites.  This is the first time she has been told that she has liver cirrhosis or hospitalized for decompensated cirrhosis.     MELD 3.0 today - 13    Addendum 1600: ceruloplasmin resulted low at 12. 24 hour Urinary copper, LDH, haptoglobin ordered. CBC with diff, CMP, fractionated bili in am.    Hospital Medications:  Current Facility-Administered Medications   Medication Dose Frequency Provider Last Rate Last Admin    magnesium sulfate IVPB premix 2 g  2 g Once Joni Sullivan M.D.        MD Alert...Total Body Iron Replacement per Pharmacy   PHARMACY TO DOSE Joni Sullivan M.D.        folic acid (Folvite) tablet 1 mg  1 mg DAILY Joni Sullivan M.D.   1 mg at 09/05/24 0501    thiamine (Vitamin B-1) tablet 100 mg  100 mg DAILY Joni Sullivan M.D.   100 mg at 09/05/24 0500    therapeutic multivitamin-minerals (Theragran-M) tablet 1 Tablet  1 Tablet DAILY Joni Sullivan M.D.   1 Tablet at 09/05/24 0500    oxyCODONE immediate-release (Roxicodone) tablet 5 mg  5 mg Q4HRS PRN Joni Sullivan M.D.        oxyCODONE immediate release  "(Roxicodone) tablet 10 mg  10 mg Q4HRS PRN Joni Sullivan M.D.   10 mg at 09/04/24 2315    ferrous sulfate tablet 325 mg  325 mg QDAY with Breakfast Joni Sullivan M.D.        spironolactone (Aldactone) tablet 25 mg  25 mg Q DAY Rodger Painting M.D.   25 mg at 09/05/24 0501    furosemide (Lasix) tablet 20 mg  20 mg Q DAY Rodger Painting M.D.   20 mg at 09/05/24 0500    ondansetron (Zofran) syringe/vial injection 4 mg  4 mg Q4HRS PRN Joni Sullivan M.D.       Last reviewed on 9/4/2024  7:02 AM by Radha Ashley R.N.       Review of Systems:  Review of Systems   Constitutional:  Negative for chills, fever and malaise/fatigue.   HENT:  Negative for hearing loss.    Eyes:  Negative for blurred vision.   Respiratory:  Negative for cough and shortness of breath.    Cardiovascular:  Negative for chest pain and leg swelling.   Gastrointestinal:  Negative for abdominal pain, blood in stool, constipation, diarrhea, heartburn, melena, nausea and vomiting.   Genitourinary:  Negative for dysuria.   Musculoskeletal:  Negative for back pain.   Skin:  Negative for rash.   Neurological:  Negative for dizziness and weakness.   Psychiatric/Behavioral:  Positive for substance abuse. Negative for depression. The patient is nervous/anxious.    All other systems reviewed and are negative.        Vital signs:  Weight/BMI: Body mass index is 24.45 kg/m².  BP 90/57   Pulse 70   Temp (!) 35.7 °C (96.3 °F) (Temporal)   Resp 18   Ht 1.626 m (5' 4\")   Wt 64.6 kg (142 lb 6.7 oz)   SpO2 92%   Vitals:    09/04/24 2031 09/05/24 0100 09/05/24 0359 09/05/24 0738   BP: 106/66 96/65 96/58 90/57   Pulse: 77 70 67 70   Resp: 17 18 17 18   Temp: 36.3 °C (97.3 °F) 36.5 °C (97.7 °F) 35.9 °C (96.7 °F) (!) 35.7 °C (96.3 °F)   TempSrc: Temporal Temporal Temporal Temporal   SpO2: 94% 90% 90% 92%   Weight:       Height:         Oxygen Therapy:  Pulse Oximetry: 92 %, O2 (LPM): 0, O2 Delivery Device: None - Room Air    Intake/Output " Summary (Last 24 hours) at 9/5/2024 0749  Last data filed at 9/4/2024 1427  Gross per 24 hour   Intake 260 ml   Output --   Net 260 ml         Physical Exam:  Physical Exam  Vitals and nursing note reviewed.   Constitutional:       General: She is not in acute distress.     Appearance: Normal appearance.   HENT:      Head: Normocephalic and atraumatic.      Right Ear: External ear normal.      Left Ear: External ear normal.      Nose: Nose normal.      Mouth/Throat:      Mouth: Mucous membranes are moist.      Pharynx: Oropharynx is clear.   Eyes:      General: No scleral icterus.  Cardiovascular:      Rate and Rhythm: Normal rate and regular rhythm.      Pulses: Normal pulses.      Heart sounds: Normal heart sounds.   Pulmonary:      Effort: Pulmonary effort is normal. No respiratory distress.      Breath sounds: Normal breath sounds.   Abdominal:      General: Bowel sounds are normal. There is distension.      Tenderness: There is no abdominal tenderness.   Musculoskeletal:         General: Normal range of motion.      Cervical back: Normal range of motion.   Skin:     General: Skin is warm and dry.      Capillary Refill: Capillary refill takes less than 2 seconds.      Coloration: Skin is pale.   Neurological:      Mental Status: She is alert and oriented to person, place, and time.   Psychiatric:         Mood and Affect: Mood normal.         Behavior: Behavior normal.         Labs:  Recent Labs     09/03/24 0753 09/04/24 1034 09/05/24 0312   SODIUM 142 139 139   POTASSIUM 3.8 3.8 4.0   CHLORIDE 107 104 106   CO2 28 24 25   BUN 5* 3* 2*   CREATININE 0.44* 0.32* 0.35*   MAGNESIUM  --   --  1.6   PHOSPHORUS  --   --  4.4   CALCIUM 7.4* 7.9* 7.6*     Recent Labs     09/03/24 0753 09/04/24  1034 09/05/24  0312   ALTSGPT 22 24 20   ASTSGOT 75* 80* 57*   ALKPHOSPHAT 107* 123* 104*   TBILIRUBIN 0.7 1.4 1.1   GLUCOSE 67 96 77     Recent Labs     09/03/24 0753 09/04/24  1034 09/05/24  0312   WBC 5.1 4.2* 5.8    NEUTSPOLYS 31.60*  --   --    LYMPHOCYTES 56.30*  --   --    MONOCYTES 7.60  --   --    EOSINOPHILS 3.70  --   --    BASOPHILS 0.80  --   --    ASTSGOT 75* 80* 57*   ALTSGPT 22 24 20   ALKPHOSPHAT 107* 123* 104*   TBILIRUBIN 0.7 1.4 1.1     Recent Labs     09/03/24  0753 09/04/24  1034 09/05/24  0312   RBC 2.12* 2.25* 1.98*   HEMOGLOBIN 8.7* 9.1* 8.0*   HEMATOCRIT 25.8* 27.4* 24.2*   PLATELETCT 298 312 273   PROTHROMBTM  --  17.3* 17.8*   INR  --  1.39* 1.45*   IRON 134  --   --    FERRITIN 572.0*  --   --    TOTIRONBC see below  --   --      Recent Results (from the past 24 hour(s))   Prothrombin Time    Collection Time: 09/04/24 10:34 AM   Result Value Ref Range    PT 17.3 (H) 12.0 - 14.6 sec    INR 1.39 (H) 0.87 - 1.13   Comp Metabolic Panel    Collection Time: 09/04/24 10:34 AM   Result Value Ref Range    Sodium 139 135 - 145 mmol/L    Potassium 3.8 3.6 - 5.5 mmol/L    Chloride 104 96 - 112 mmol/L    Co2 24 20 - 33 mmol/L    Anion Gap 11.0 7.0 - 16.0    Glucose 96 65 - 99 mg/dL    Bun 3 (L) 8 - 22 mg/dL    Creatinine 0.32 (L) 0.50 - 1.40 mg/dL    Calcium 7.9 (L) 8.5 - 10.5 mg/dL    Correct Calcium 9.0 8.5 - 10.5 mg/dL    AST(SGOT) 80 (H) 12 - 45 U/L    ALT(SGPT) 24 2 - 50 U/L    Alkaline Phosphatase 123 (H) 30 - 99 U/L    Total Bilirubin 1.4 0.1 - 1.5 mg/dL    Albumin 2.6 (L) 3.2 - 4.9 g/dL    Total Protein 5.5 (L) 6.0 - 8.2 g/dL    Globulin 2.9 1.9 - 3.5 g/dL    A-G Ratio 0.9 g/dL   Lipid Profile    Collection Time: 09/04/24 10:34 AM   Result Value Ref Range    Cholesterol,Tot 84 (L) 100 - 199 mg/dL    Triglycerides 63 0 - 149 mg/dL    HDL 46 >=40 mg/dL    LDL 25 <100 mg/dL   CBC WITHOUT DIFFERENTIAL    Collection Time: 09/04/24 10:34 AM   Result Value Ref Range    WBC 4.2 (L) 4.8 - 10.8 K/uL    RBC 2.25 (L) 4.20 - 5.40 M/uL    Hemoglobin 9.1 (L) 12.0 - 16.0 g/dL    Hematocrit 27.4 (L) 37.0 - 47.0 %    .8 (H) 81.4 - 97.8 fL    MCH 40.4 (H) 27.0 - 33.0 pg    MCHC 33.2 32.2 - 35.5 g/dL    RDW 92.3 (H)  35.9 - 50.0 fL    Platelet Count 312 164 - 446 K/uL    MPV 10.0 9.0 - 12.9 fL   TRANSFERRIN    Collection Time: 09/04/24 10:34 AM   Result Value Ref Range    Transferrin 116 (L) 200 - 370 mg/dL   ESTIMATED GFR    Collection Time: 09/04/24 10:34 AM   Result Value Ref Range    GFR (CKD-EPI) 143 >60 mL/min/1.73 m 2   Prothrombin Time    Collection Time: 09/05/24  3:12 AM   Result Value Ref Range    PT 17.8 (H) 12.0 - 14.6 sec    INR 1.45 (H) 0.87 - 1.13   CBC WITHOUT DIFFERENTIAL    Collection Time: 09/05/24  3:12 AM   Result Value Ref Range    WBC 5.8 4.8 - 10.8 K/uL    RBC 1.98 (L) 4.20 - 5.40 M/uL    Hemoglobin 8.0 (L) 12.0 - 16.0 g/dL    Hematocrit 24.2 (L) 37.0 - 47.0 %    .2 (H) 81.4 - 97.8 fL    MCH 40.4 (H) 27.0 - 33.0 pg    MCHC 33.1 32.2 - 35.5 g/dL    RDW 90.4 (H) 35.9 - 50.0 fL    Platelet Count 273 164 - 446 K/uL    MPV 10.0 9.0 - 12.9 fL   Comp Metabolic Panel    Collection Time: 09/05/24  3:12 AM   Result Value Ref Range    Sodium 139 135 - 145 mmol/L    Potassium 4.0 3.6 - 5.5 mmol/L    Chloride 106 96 - 112 mmol/L    Co2 25 20 - 33 mmol/L    Anion Gap 8.0 7.0 - 16.0    Glucose 77 65 - 99 mg/dL    Bun 2 (L) 8 - 22 mg/dL    Creatinine 0.35 (L) 0.50 - 1.40 mg/dL    Calcium 7.6 (L) 8.5 - 10.5 mg/dL    Correct Calcium 9.0 8.5 - 10.5 mg/dL    AST(SGOT) 57 (H) 12 - 45 U/L    ALT(SGPT) 20 2 - 50 U/L    Alkaline Phosphatase 104 (H) 30 - 99 U/L    Total Bilirubin 1.1 0.1 - 1.5 mg/dL    Albumin 2.3 (L) 3.2 - 4.9 g/dL    Total Protein 4.8 (L) 6.0 - 8.2 g/dL    Globulin 2.5 1.9 - 3.5 g/dL    A-G Ratio 0.9 g/dL   MAGNESIUM    Collection Time: 09/05/24  3:12 AM   Result Value Ref Range    Magnesium 1.6 1.5 - 2.5 mg/dL   PHOSPHORUS    Collection Time: 09/05/24  3:12 AM   Result Value Ref Range    Phosphorus 4.4 2.5 - 4.5 mg/dL   ESTIMATED GFR    Collection Time: 09/05/24  3:12 AM   Result Value Ref Range    GFR (CKD-EPI) 140 >60 mL/min/1.73 m 2       Radiology Review:  US-LIVER AND VESSELS COMPLETE (COMBO)    Final Result      1.  Cirrhosis with portal hypertension      2.  Patent hepatic and portal veins      3.  Ascites      US-PARACENTESIS, ABD WITH IMAGING   Final Result      1. Ultrasound-guided therapeutic and diagnostic paracentesis of the right lower quadrant of the abdominal wall.      2. 580 mL of fluid withdrawn.      US-RUQ   Final Result         1.  Hepatomegaly   2.  Echogenic liver with irregular contour, compatible with changes of cirrhosis   3.  Gallbladder sludge   4.  Perihepatic ascites        MDM (Data Review):   -Records reviewed and summarized in current documentation  -I personally reviewed and interpreted the laboratory results  -I personally reviewed the radiology images    Assessment/Recommendations:  ASSESSMENT:  Decompensated Cirrhosis - history of fatty liver disease. Denies excessive alcohol intake although AST>ALT is more suggestive of alcohol intake alongside macrocytic anemia.   Portal hypertension - Doppler US negative for Budd-Chiari, patent hepatic and portal veins. SAAG 2.4  Ascites - S/P para 580 mL removed, negative for SBP   GI bleeding with melena and BRBPR - mild portal hypertensive gastropathy and external hemorrhoids on bidirectional endoscopy  GERD  Chronic diarrhea - vitamin deficiency vs malnutrition vs eliac  Folic acid deficiency  Elevated INR  TSH, B12, lipid panel unremarkable    RECOMMENDATIONS:  Discontinued occult stool, no indication as she had a colonoscopy   Folic acid replacement  2 g sodium restriction diet.  Diuresis with lasix, spironolactone- would increase dosages to Lasix 40mg daily, spironolactone 100mg daily.   LAY, ASMA, Mitochondrial Ab, Peth, ceruloplasmin, celiac, vitamin A and IgA quant pending  Ascites is reaccumulating, may need another paracentesis tomorrow    Discussed with patient, RN, Dr. Hoffmann, Dr. Fountain    ..Leann Alvarado, DNP,  APRN    Core Quality Measures   Reviewed items::  Labs, Medications and Radiology reports reviewed

## 2024-09-05 NOTE — CARE PLAN
The patient is Stable - Low risk of patient condition declining or worsening    Shift Goals  Clinical Goals: Pain will remain at or below comfort level, patient will remain free from falls  Patient Goals: rest, pain control  Family Goals: GLORY    Progress made toward(s) clinical / shift goals:    Problem: Knowledge Deficit - Standard  Goal: Patient and family/care givers will demonstrate understanding of plan of care, disease process/condition, diagnostic tests and medications  Outcome: Progressing     Problem: Pain - Standard  Goal: Alleviation of pain or a reduction in pain to the patient’s comfort goal of 3/10 by the end of this shift  Outcome: Progressing     Problem: Depression  Goal: Patient and family/caregiver will verbalize accurate information about at least two of the possible causes of depression, three-four of the signs and symptoms of depression  Outcome: Progressing     Problem: Bowel Elimination  Goal: Establish and maintain regular bowel function with at least one bowel movement in the past 24 hours  Outcome: Progressing     Problem: Gastrointestinal Irritability  Goal: Nausea and vomiting will be absent or improve to baseline of no nausea and vomiting  Outcome: Progressing       Patient is not progressing towards the following goals:

## 2024-09-05 NOTE — CARE PLAN
The patient is Stable - Low risk of patient condition declining or worsening    Shift Goals  Clinical Goals: Fall precautions, pain mgmt, and safety  Patient Goals: Rest  Family Goals: GLORY    Progress made toward(s) clinical / shift goals:  Patient remains compliant with the plan of care and expresses pain appropriately to the healthcare team. Patient asks for assistance when necessary by using her call light.       Problem: Knowledge Deficit - Standard  Goal: Patient and family/care givers will demonstrate understanding of plan of care, disease process/condition, diagnostic tests and medications  Outcome: Progressing     Problem: Pain - Standard  Goal: Alleviation of pain or a reduction in pain to the patient’s comfort goal  Outcome: Progressing     Problem: Gastrointestinal Irritability  Goal: Nausea and vomiting will be absent or improve  Outcome: Progressing

## 2024-09-05 NOTE — PROGRESS NOTES
Received report from NOC RN, assumed patient care. Pt assessment performed, pt A&Ox4 and reporting 7/10 pain located in right lower abdomen, will medicate per MAR. Generalized weakness throughout. Call light and belongings within reach, bed locked and in lowest position. Pt reports no further questions or concerns at this time.

## 2024-09-05 NOTE — PROGRESS NOTES
Assumed care from Day Rn at 1900. Patient is alert and oriented to person, place, time and event. Patient denies SOB, chest pain, or abdominal pain at this time. Call light within reach and all questions answered for patient at this time.

## 2024-09-06 ENCOUNTER — APPOINTMENT (OUTPATIENT)
Dept: RADIOLOGY | Facility: MEDICAL CENTER | Age: 29
DRG: 433 | End: 2024-09-06
Attending: NURSE PRACTITIONER
Payer: MEDICAID

## 2024-09-06 PROBLEM — I95.9 HYPOTENSION: Status: ACTIVE | Noted: 2024-09-06

## 2024-09-06 LAB
ALBUMIN SERPL BCP-MCNC: 2.2 G/DL (ref 3.2–4.9)
ALBUMIN/GLOB SERPL: 0.8 G/DL
ALP SERPL-CCNC: 107 U/L (ref 30–99)
ALT SERPL-CCNC: 21 U/L (ref 2–50)
ANION GAP SERPL CALC-SCNC: 7 MMOL/L (ref 7–16)
AST SERPL-CCNC: 68 U/L (ref 12–45)
BASOPHILS # BLD AUTO: 0.8 % (ref 0–1.8)
BASOPHILS # BLD: 0.04 K/UL (ref 0–0.12)
BILIRUB CONJ SERPL-MCNC: 0.3 MG/DL (ref 0.1–0.5)
BILIRUB INDIRECT SERPL-MCNC: 0.5 MG/DL (ref 0–1)
BILIRUB SERPL-MCNC: 0.8 MG/DL (ref 0.1–1.5)
BUN SERPL-MCNC: 2 MG/DL (ref 8–22)
CALCIUM ALBUM COR SERPL-MCNC: 9.6 MG/DL (ref 8.5–10.5)
CALCIUM SERPL-MCNC: 8.2 MG/DL (ref 8.5–10.5)
CHLORIDE SERPL-SCNC: 104 MMOL/L (ref 96–112)
CO2 SERPL-SCNC: 25 MMOL/L (ref 20–33)
CORTIS SERPL-MCNC: 6.2 UG/DL (ref 0–23)
CREAT SERPL-MCNC: 0.29 MG/DL (ref 0.5–1.4)
EOSINOPHIL # BLD AUTO: 0.17 K/UL (ref 0–0.51)
EOSINOPHIL NFR BLD: 3.3 % (ref 0–6.9)
ERYTHROCYTE [DISTWIDTH] IN BLOOD BY AUTOMATED COUNT: 86 FL (ref 35.9–50)
GFR SERPLBLD CREATININE-BSD FMLA CKD-EPI: 147 ML/MIN/1.73 M 2
GLOBULIN SER CALC-MCNC: 2.9 G/DL (ref 1.9–3.5)
GLUCOSE SERPL-MCNC: 85 MG/DL (ref 65–99)
HCT VFR BLD AUTO: 24.7 % (ref 37–47)
HGB BLD-MCNC: 8 G/DL (ref 12–16)
IMM GRANULOCYTES # BLD AUTO: 0.01 K/UL (ref 0–0.11)
IMM GRANULOCYTES NFR BLD AUTO: 0.2 % (ref 0–0.9)
LABORATORY REPORT: NORMAL
LYMPHOCYTES # BLD AUTO: 2.64 K/UL (ref 1–4.8)
LYMPHOCYTES NFR BLD: 51.5 % (ref 22–41)
MCH RBC QN AUTO: 39.6 PG (ref 27–33)
MCHC RBC AUTO-ENTMCNC: 32.4 G/DL (ref 32.2–35.5)
MCV RBC AUTO: 122.3 FL (ref 81.4–97.8)
MONOCYTES # BLD AUTO: 0.45 K/UL (ref 0–0.85)
MONOCYTES NFR BLD AUTO: 8.8 % (ref 0–13.4)
NEUTROPHILS # BLD AUTO: 1.82 K/UL (ref 1.82–7.42)
NEUTROPHILS NFR BLD: 35.4 % (ref 44–72)
NRBC # BLD AUTO: 0.02 K/UL
NRBC BLD-RTO: 0.4 /100 WBC (ref 0–0.2)
PETH INTERPRETATION NL11780: NORMAL
PLATELET # BLD AUTO: 248 K/UL (ref 164–446)
PLPETH BLD-MCNC: 455 NG/ML
PMV BLD AUTO: 10 FL (ref 9–12.9)
POPETH BLD-MCNC: 1035 NG/ML
POTASSIUM SERPL-SCNC: 4 MMOL/L (ref 3.6–5.5)
PROT SERPL-MCNC: 5.1 G/DL (ref 6–8.2)
RBC # BLD AUTO: 2.02 M/UL (ref 4.2–5.4)
SODIUM SERPL-SCNC: 136 MMOL/L (ref 135–145)
WBC # BLD AUTO: 5.1 K/UL (ref 4.8–10.8)

## 2024-09-06 PROCEDURE — 700102 HCHG RX REV CODE 250 W/ 637 OVERRIDE(OP): Performed by: HOSPITALIST

## 2024-09-06 PROCEDURE — 76705 ECHO EXAM OF ABDOMEN: CPT

## 2024-09-06 PROCEDURE — 80053 COMPREHEN METABOLIC PANEL: CPT

## 2024-09-06 PROCEDURE — 99232 SBSQ HOSP IP/OBS MODERATE 35: CPT | Performed by: HOSPITALIST

## 2024-09-06 PROCEDURE — 82248 BILIRUBIN DIRECT: CPT

## 2024-09-06 PROCEDURE — 85025 COMPLETE CBC W/AUTO DIFF WBC: CPT

## 2024-09-06 PROCEDURE — 99232 SBSQ HOSP IP/OBS MODERATE 35: CPT | Performed by: NURSE PRACTITIONER

## 2024-09-06 PROCEDURE — 700111 HCHG RX REV CODE 636 W/ 250 OVERRIDE (IP): Mod: JZ | Performed by: HOSPITALIST

## 2024-09-06 PROCEDURE — 700102 HCHG RX REV CODE 250 W/ 637 OVERRIDE(OP)

## 2024-09-06 PROCEDURE — 36415 COLL VENOUS BLD VENIPUNCTURE: CPT

## 2024-09-06 PROCEDURE — A9270 NON-COVERED ITEM OR SERVICE: HCPCS | Performed by: HOSPITALIST

## 2024-09-06 PROCEDURE — 770006 HCHG ROOM/CARE - MED/SURG/GYN SEMI*

## 2024-09-06 PROCEDURE — 82525 ASSAY OF COPPER: CPT

## 2024-09-06 PROCEDURE — A9270 NON-COVERED ITEM OR SERVICE: HCPCS

## 2024-09-06 PROCEDURE — 82533 TOTAL CORTISOL: CPT

## 2024-09-06 RX ORDER — CALCIUM CARBONATE 500 MG/1
500 TABLET, CHEWABLE ORAL 4 TIMES DAILY PRN
Status: DISCONTINUED | OUTPATIENT
Start: 2024-09-06 | End: 2024-09-07 | Stop reason: HOSPADM

## 2024-09-06 RX ORDER — COSYNTROPIN 0.25 MG/ML
250 INJECTION, POWDER, FOR SOLUTION INTRAMUSCULAR; INTRAVENOUS ONCE
Status: COMPLETED | OUTPATIENT
Start: 2024-09-07 | End: 2024-09-07

## 2024-09-06 RX ORDER — FAMOTIDINE 20 MG/1
20 TABLET, FILM COATED ORAL 2 TIMES DAILY
Status: DISCONTINUED | OUTPATIENT
Start: 2024-09-06 | End: 2024-09-07 | Stop reason: HOSPADM

## 2024-09-06 RX ORDER — CARBOXYMETHYLCELLULOSE SODIUM 5 MG/ML
1 SOLUTION/ DROPS OPHTHALMIC PRN
Status: DISCONTINUED | OUTPATIENT
Start: 2024-09-06 | End: 2024-09-07 | Stop reason: HOSPADM

## 2024-09-06 RX ADMIN — FOLIC ACID 1 MG: 1 TABLET ORAL at 04:52

## 2024-09-06 RX ADMIN — Medication 100 MG: at 04:52

## 2024-09-06 RX ADMIN — Medication 1 TABLET: at 04:52

## 2024-09-06 RX ADMIN — OXYCODONE HYDROCHLORIDE 10 MG: 10 TABLET ORAL at 13:37

## 2024-09-06 RX ADMIN — ESCITALOPRAM OXALATE 10 MG: 10 TABLET ORAL at 04:52

## 2024-09-06 RX ADMIN — FERROUS SULFATE TAB 325 MG (65 MG ELEMENTAL FE) 325 MG: 325 (65 FE) TAB at 08:10

## 2024-09-06 RX ADMIN — OXYCODONE HYDROCHLORIDE 10 MG: 10 TABLET ORAL at 08:10

## 2024-09-06 RX ADMIN — ONDANSETRON 4 MG: 2 INJECTION INTRAMUSCULAR; INTRAVENOUS at 20:58

## 2024-09-06 RX ADMIN — OXYCODONE HYDROCHLORIDE 10 MG: 10 TABLET ORAL at 18:38

## 2024-09-06 RX ADMIN — FAMOTIDINE 20 MG: 20 TABLET, FILM COATED ORAL at 21:32

## 2024-09-06 ASSESSMENT — ENCOUNTER SYMPTOMS
WEAKNESS: 0
VOMITING: 0
BLURRED VISION: 0
MYALGIAS: 0
SHORTNESS OF BREATH: 0
HEMOPTYSIS: 0
CLAUDICATION: 0
BACK PAIN: 0
PND: 0
HEADACHES: 0
CONSTIPATION: 0
PALPITATIONS: 0
CHILLS: 0
NERVOUS/ANXIOUS: 1
DIZZINESS: 0
COUGH: 0
HEARTBURN: 0
BRUISES/BLEEDS EASILY: 0
DEPRESSION: 0
DOUBLE VISION: 0
DIARRHEA: 0
NAUSEA: 0
BLOOD IN STOOL: 0
WHEEZING: 0
ABDOMINAL PAIN: 1
FEVER: 0

## 2024-09-06 ASSESSMENT — LIFESTYLE VARIABLES: SUBSTANCE_ABUSE: 1

## 2024-09-06 ASSESSMENT — PAIN DESCRIPTION - PAIN TYPE
TYPE: ACUTE PAIN

## 2024-09-06 NOTE — PROGRESS NOTES
Received report and assumed care of patient at change of shift. Patient is A&Ox4, on RA, and reports 9/10 pain at this time. Patient assessment completed, bed in lowest position, and call light and personal belongings are within reach. Patient expressed no further needs at this time.

## 2024-09-06 NOTE — PROGRESS NOTES
Received report from Day RN, assumed patient care. Pt assessment performed, pt A&Ox4 and reporting 8/10 pain located in right lower abdomen, will medicate per MAR. Generalized weakness throughout. Call light and belongings within reach, bed locked and in lowest position. Pt reports no further questions or concerns at this time.

## 2024-09-06 NOTE — CARE PLAN
The patient is Stable - Low risk of patient condition declining or worsening    Shift Goals  Clinical Goals: Urine collection and pain control  Patient Goals: Rest comfortably and eat  Family Goals: GLORY    Progress made toward(s) clinical / shift goals:  Patient remains compliant with plan of care. Patient uses call light appropriately to ask for medications for pain and alerting the healthcare team to her 24hour urine collection.       Problem: Knowledge Deficit - Standard  Goal: Patient and family/care givers will demonstrate understanding of plan of care, disease process/condition, diagnostic tests and medications  Outcome: Progressing     Problem: Pain - Standard  Goal: Alleviation of pain or a reduction in pain to the patient’s comfort goal  Outcome: Progressing

## 2024-09-06 NOTE — PROGRESS NOTES
[facility] Medicine Daily Progress Note    Date of Service  9/6/2024    Chief Complaint  Linda Carpenter is a 28 y.o. female admitted 9/2/2024 with cirrhosis    Hospital Course  Linda Carpenter is a 28 y.o. female who presented 9/2/2024 with abdominal distention and pain for few weeks with associated nausea and vomiting.  Patient reports occasionally drinking alcohol.  Denies any chest pain, fever, chills.  In the ER at outside facility CT abdomen pelvis showed hepatic steatosis, portal hypertension, gallbladder distended of unclear etiology suggestion of wall thickening distal stomach raising concern for gastritis.  Patient transferred here for GI evaluation.     Interval Problem Update  9/4 patient is resting in bed, she had EGD colonoscopy today no active bleeding, iron levels are low, will start her iron supplementation, note from GI reviewed, follow-up autoimmune hepatitis panel, discussed with bedside nurse charge nurse , patient's blood pressure is borderline low, will continue close monitoring, no signs of active bleeding, patient tolerating diet.  Patient is alert oriented follows commands she is able to express understanding and she is able to speak in full sentences all question have been answered.  9/5 patient is resting in bed, she is alert oriented follows commands, she had mild abdominal pain, she is able to urinate and having bowel movements, no focal weakness no numbness or tingling, discussed with GI will continue monitoring and waiting for cirrhosis workup, later today ceruloplasmin levels were low started on urinary collection, autoimmune hepatitis panel negative so far, discussed with bedside nurse charge nurse  pharmacist, all questions been answered.  9/6 patient is resting in bed, she denies any new complaints, pain is stable, patient having hypotension, orthostatic vital signs ordered, will hold diuretics for now, ultrasound abdomen did not show  significant ascites, discussed with GI team, continue close monitoring, discussed with bedside nurse charge nurse  pharmacist.    I have discussed this patient's plan of care and discharge plan at IDT rounds today with Case Management, Nursing, Nursing leadership, and other members of the IDT team.    Consultants/Specialty  GI    Code Status  Full Code    Disposition  The patient is not medically cleared for discharge to home or a post-acute facility.      I have placed the appropriate orders for post-discharge needs.    Review of Systems  Review of Systems   Constitutional:  Negative for chills and fever.   Eyes:  Negative for blurred vision and double vision.   Respiratory:  Negative for cough, hemoptysis and wheezing.    Cardiovascular:  Negative for chest pain, palpitations, claudication, leg swelling and PND.   Gastrointestinal:  Positive for abdominal pain. Negative for heartburn, nausea and vomiting.   Genitourinary:  Negative for hematuria and urgency.   Musculoskeletal:  Negative for back pain and myalgias.   Skin:  Negative for rash.   Neurological:  Negative for dizziness and headaches.   Endo/Heme/Allergies:  Does not bruise/bleed easily.   Psychiatric/Behavioral:  Negative for depression.         Physical Exam  Temp:  [36.3 °C (97.4 °F)-36.6 °C (97.8 °F)] 36.3 °C (97.4 °F)  Pulse:  [63-74] 71  Resp:  [16-17] 16  BP: ()/(42-68) 102/67  SpO2:  [91 %-99 %] 91 %    Physical Exam  Vitals and nursing note reviewed.   Constitutional:       Appearance: Normal appearance.   Eyes:      General: No scleral icterus.        Right eye: No discharge.         Left eye: No discharge.   Cardiovascular:      Rate and Rhythm: Normal rate and regular rhythm.      Pulses: Normal pulses.      Heart sounds: Normal heart sounds.   Pulmonary:      Effort: Pulmonary effort is normal. No respiratory distress.   Abdominal:      General: Abdomen is flat. Bowel sounds are normal. There is no distension.       Palpations: There is no mass.      Tenderness: There is abdominal tenderness (Mild).   Musculoskeletal:         General: Normal range of motion.      Cervical back: Normal range of motion and neck supple.   Skin:     Coloration: Skin is pale.   Neurological:      General: No focal deficit present.      Mental Status: She is alert and oriented to person, place, and time.   Psychiatric:         Mood and Affect: Mood normal.         Fluids    Intake/Output Summary (Last 24 hours) at 9/6/2024 1622  Last data filed at 9/6/2024 1500  Gross per 24 hour   Intake 1860 ml   Output 300 ml   Net 1560 ml        Laboratory  Recent Labs     09/04/24  1034 09/05/24 0312 09/06/24  0119   WBC 4.2* 5.8 5.1   RBC 2.25* 1.98* 2.02*   HEMOGLOBIN 9.1* 8.0* 8.0*   HEMATOCRIT 27.4* 24.2* 24.7*   .8* 122.2* 122.3*   MCH 40.4* 40.4* 39.6*   MCHC 33.2 33.1 32.4   RDW 92.3* 90.4* 86.0*   PLATELETCT 312 273 248   MPV 10.0 10.0 10.0     Recent Labs     09/04/24  1034 09/05/24 0312 09/06/24  0119   SODIUM 139 139 136   POTASSIUM 3.8 4.0 4.0   CHLORIDE 104 106 104   CO2 24 25 25   GLUCOSE 96 77 85   BUN 3* 2* 2*   CREATININE 0.32* 0.35* 0.29*   CALCIUM 7.9* 7.6* 8.2*     Recent Labs     09/04/24 1034 09/05/24 0312   INR 1.39* 1.45*         Recent Labs     09/04/24  1034   TRIGLYCERIDE 63   HDL 46   LDL 25       Imaging  US-ABDOMEN LTD (SOFT TISSUE)   Final Result      Minimal ascites.  Insufficient for safe paracentesis.      US-LIVER AND VESSELS COMPLETE (COMBO)   Final Result      1.  Cirrhosis with portal hypertension      2.  Patent hepatic and portal veins      3.  Ascites      US-PARACENTESIS, ABD WITH IMAGING   Final Result      1. Ultrasound-guided therapeutic and diagnostic paracentesis of the right lower quadrant of the abdominal wall.      2. 580 mL of fluid withdrawn.      US-RUQ   Final Result         1.  Hepatomegaly   2.  Echogenic liver with irregular contour, compatible with changes of cirrhosis   3.  Gallbladder sludge    4.  Perihepatic ascites           Assessment/Plan  * Cirrhosis (HCC)- (present on admission)  Assessment & Plan  Start on aldactone and lasix   Check hepatitis panel, ammonia   IR consult for paracentesis     Post paracentesis  GI consulted  Follow-up autoimmune hepatitis workup    Ceruloplasmin is low, started on ceruloplasmin urine collection  Discussed with GI  Autoimmune hepatitis workup negative so far    Finishing urine collection today        Hypotension  Assessment & Plan  Cortisol level is low  Will get cosyntropin test in a.m., if positive will start steroids    Coagulopathy (HCC)  Assessment & Plan  INR 1.45  Status post vitamin K  Follow-up INR in a.m.      Ascites- (present on admission)  Assessment & Plan  Status post paracentesis no signs of SBP    Anemia- (present on admission)  Assessment & Plan  Iron levels are low  Started oral iron  EGD colonoscopy today no signs of active bleeding  Hemoglobin 8.0 today  MCV 1 22  Low folic acid level started on folic acid replacement    Gastroesophageal reflux disease- (present on admission)  Assessment & Plan  Continue PPI         VTE prophylaxis: scd    I have performed a physical exam and reviewed and updated ROS and Plan today (9/6/2024). In review of yesterday's note (9/5/2024), there are no changes except as documented above.

## 2024-09-06 NOTE — CARE PLAN
The patient is Stable - Low risk of patient condition declining or worsening    Shift Goals  Clinical Goals: 24 hour urine collection, monitor labs, pain control  Patient Goals: Pain control  Family Goals: NA    Progress made toward(s) clinical / shift goals:    Problem: Knowledge Deficit - Standard  Goal: Patient and family/care givers will demonstrate understanding of plan of care, disease process/condition, diagnostic tests and medications  Outcome: Progressing     Problem: Pain - Standard  Goal: Alleviation of pain or a reduction in pain to the patient’s comfort goal of 3/10 by the end of this shift  Outcome: Progressing     Problem: Depression  Goal: Patient and family/caregiver will verbalize accurate information about at least two of the possible causes of depression, three-four of the signs and symptoms of depression  Outcome: Progressing     Problem: Bowel Elimination  Goal: Establish and maintain regular bowel function with pt reporting at least one bowel movement in the last 24 hours  Outcome: Progressing     Problem: Gastrointestinal Irritability  Goal: Nausea and vomiting will be absent or improve with pt reporting decrease in or absence of nausea and vomiting  Outcome: Progressing       Patient is not progressing towards the following goals:

## 2024-09-06 NOTE — PROGRESS NOTES
..Gastroenterology Progress Note               Author:  Leann Alvarado, DNP,  APRN Date & Time Created: 2024 7:42 AM       Patient ID:  Name:             Linda Carpenter    YOB: 1995  Age:                 28 y.o.  female  MRN:               [id number]    Medical Decision Making, by Problem:  Active Hospital Problems    Diagnosis     Gastroesophageal reflux disease [K21.9]     Anemia [D64.9]     Ascites [R18.8]     Coagulopathy (HCC) [D68.9]     Cirrhosis (HCC) [K74.60]      Presenting Chief Complaint:  anemia, melena, new onset cirrhosis     History of Present Illness:   This is a 28 year old female with a past medical history of fatty liver disease, GERD ( onset, currently managed with pantoprazole) who was transferred from [facility] to [facility] 2024 with concern for acute cholecystitis and new onset decompensated cirrhosis. At outside facility, CT abdomen pelvis with findings including hepatic steatosis, portal hypertension, gallbladder distended of unclear etiology, suggestion of wall thickening distal stomach raising concern for gastritis, Large amount of ascites, recanalization of umbilical vein. No splenomegaly.  Hemoglobin 8.9, hematocrit 25, Potassium 3, Albumin 2.8, AST 81. MELD 3.0 score there 9. UA consistent for UTI.      This morning, labs WBC 5.1, Hgb 8.7, Hct 25.8, .7, plt 298. Na 142, K 3.8, BUN 5, Cr 0.44, AST 75, ALT 22, , tbili 0.7, Albumin 2.4.  Hepatitis panel negative. No coagulation studies. RUQ ultrasound showed hepatomegaly, echogenic liver with irregular contour, compatible with cirrhosis, gallbladder sludge, and perihepatic ascites. IVC is patent with hepatopetal flow, MPV 1.04cm. NO choledocholithiasis- CBD 3.9mm.     2024: EGD/colonoscopy:  Mild portal hypertensive gastropathy otherwise normal upper endoscopy.  External hemorrhoids.  Bidirectional endoscopy otherwise normal.    Interval  History:  9/5/2024: Patient seen and examined.  Endorses a 3-year history of occasional nausea, vomiting, tremors, and a 15 to 20 pound weight loss over the last 3 to 4 months alongside near constant diarrhea with right upper quadrant abdominal pain.  Also reports onset of tremors Had 2 liquid bowel movements overnight.  Tolerating her diet with no nausea currently.  Labs reviewed.    I had a zaira discussion with her about her newly diagnosed decompensated cirrhosis.  She was tearful and honestly admitted that she had been drinking a lot more than she previously reported.  She has been under a lot of stress lately but recognizes that she has a 9 and a 3-year-old that she wants to live for.  We talked at length about AA, resources to help her stay sober, cirrhotic management, and how to manage the side effects of portal hypertension including ascites.  This is the first time she has been told that she has liver cirrhosis or hospitalized for decompensated cirrhosis.     MELD 3.0 today - 13    Addendum 1600: ceruloplasmin resulted low at 12. 24 hour Urinary copper, LDH, haptoglobin ordered. CBC with diff, CMP, fractionated bili in am.    9/6/2024: Patient seen and examined.  Reporting significant abdominal discomfort secondary to ascites.  Abdominal ultrasound ordered to assess for paracentesis.  24-hour urinary copper collection in progress.    Hospital Medications:  Current Facility-Administered Medications   Medication Dose Frequency Provider Last Rate Last Admin    escitalopram (Lexapro) tablet 10 mg  10 mg DAILY Joni Sullivan M.D.   10 mg at 09/06/24 0452    folic acid (Folvite) tablet 1 mg  1 mg DAILY Joni Sullivan M.D.   1 mg at 09/06/24 0452    thiamine (Vitamin B-1) tablet 100 mg  100 mg DAILY Joni Sullivan M.D.   100 mg at 09/06/24 0452    therapeutic multivitamin-minerals (Theragran-M) tablet 1 Tablet  1 Tablet DAILY Joni Sullivan M.D.   1 Tablet at 09/06/24 0452    oxyCODONE  "immediate-release (Roxicodone) tablet 5 mg  5 mg Q4HRS PRN Joni Sullivan M.D.        oxyCODONE immediate release (Roxicodone) tablet 10 mg  10 mg Q4HRS PRN Joni Sullivan M.D.   10 mg at 09/05/24 1927    ferrous sulfate tablet 325 mg  325 mg QDAY with Breakfast Joni Sullivan M.D.   325 mg at 09/05/24 0817    spironolactone (Aldactone) tablet 25 mg  25 mg Q DAY Rodger Painting M.D.   25 mg at 09/05/24 0501    furosemide (Lasix) tablet 20 mg  20 mg Q DAY Rodger Painting M.D.   20 mg at 09/05/24 0500    ondansetron (Zofran) syringe/vial injection 4 mg  4 mg Q4HRS PRREMY Sullivan M.D.       Last reviewed on 9/4/2024  7:02 AM by Radha Ashley R.N.       Review of Systems:  Review of Systems   Constitutional:  Negative for chills, fever and malaise/fatigue.   HENT:  Negative for hearing loss.    Eyes:  Negative for blurred vision.   Respiratory:  Negative for cough and shortness of breath.    Cardiovascular:  Negative for chest pain and leg swelling.   Gastrointestinal:  Positive for abdominal pain. Negative for blood in stool, constipation, diarrhea, heartburn, melena, nausea and vomiting.   Genitourinary:  Negative for dysuria.   Musculoskeletal:  Negative for back pain.   Skin:  Negative for rash.   Neurological:  Negative for dizziness and weakness.   Psychiatric/Behavioral:  Positive for substance abuse. Negative for depression. The patient is nervous/anxious.    All other systems reviewed and are negative.        Vital signs:  Weight/BMI: Body mass index is 24.45 kg/m².  BP (!) 79/42   Pulse 63   Temp 36.3 °C (97.4 °F) (Temporal)   Resp 16   Ht 1.626 m (5' 4\")   Wt 64.6 kg (142 lb 6.7 oz)   SpO2 91%   Vitals:    09/05/24 1510 09/05/24 1906 09/06/24 0503 09/06/24 0702   BP: 96/61 100/68 (!) 81/55 (!) 79/42   Pulse: 71 74 63 63   Resp: 18 16 17 16   Temp: 36.1 °C (97 °F) 36.6 °C (97.8 °F) 36.5 °C (97.7 °F) 36.3 °C (97.4 °F)   TempSrc: Temporal Temporal Temporal Temporal "   SpO2: 91% 94% 99% 91%   Weight:       Height:         Oxygen Therapy:  Pulse Oximetry: 91 %, O2 (LPM): 0, O2 Delivery Device: None - Room Air    Intake/Output Summary (Last 24 hours) at 9/6/2024 0742  Last data filed at 9/5/2024 1700  Gross per 24 hour   Intake 240 ml   Output --   Net 240 ml         Physical Exam:  Physical Exam  Vitals and nursing note reviewed.   Constitutional:       General: She is not in acute distress.     Appearance: Normal appearance.   HENT:      Head: Normocephalic and atraumatic.      Right Ear: External ear normal.      Left Ear: External ear normal.      Nose: Nose normal.      Mouth/Throat:      Mouth: Mucous membranes are moist.      Pharynx: Oropharynx is clear.   Eyes:      General: No scleral icterus.  Cardiovascular:      Rate and Rhythm: Normal rate and regular rhythm.      Pulses: Normal pulses.      Heart sounds: Normal heart sounds.   Pulmonary:      Effort: Pulmonary effort is normal. No respiratory distress.      Breath sounds: Normal breath sounds.   Abdominal:      General: Bowel sounds are normal. There is distension.      Tenderness: There is abdominal tenderness.   Musculoskeletal:         General: Normal range of motion.      Cervical back: Normal range of motion.   Skin:     General: Skin is warm and dry.      Capillary Refill: Capillary refill takes less than 2 seconds.      Coloration: Skin is pale.   Neurological:      Mental Status: She is alert and oriented to person, place, and time.   Psychiatric:         Mood and Affect: Mood normal.         Behavior: Behavior normal.         Labs:  Recent Labs     09/04/24  1034 09/05/24 0312 09/06/24  0119   SODIUM 139 139 136   POTASSIUM 3.8 4.0 4.0   CHLORIDE 104 106 104   CO2 24 25 25   BUN 3* 2* 2*   CREATININE 0.32* 0.35* 0.29*   MAGNESIUM  --  1.6  --    PHOSPHORUS  --  4.4  --    CALCIUM 7.9* 7.6* 8.2*     Recent Labs     09/04/24  1034 09/05/24  0312 09/06/24  0119   ALTSGPT 24 20 21   ASTSGOT 80* 57* 68*    ALKPHOSPHAT 123* 104* 107*   TBILIRUBIN 1.4 1.1 0.8   DBILIRUBIN  --   --  0.3   GLUCOSE 96 77 85     Recent Labs     09/03/24 0753 09/04/24 1034 09/05/24 0312 09/06/24  0119   WBC 5.1 4.2* 5.8 5.1   NEUTSPOLYS 31.60*  --   --  35.40*   LYMPHOCYTES 56.30*  --   --  51.50*   MONOCYTES 7.60  --   --  8.80   EOSINOPHILS 3.70  --   --  3.30   BASOPHILS 0.80  --   --  0.80   ASTSGOT 75* 80* 57* 68*   ALTSGPT 22 24 20 21   ALKPHOSPHAT 107* 123* 104* 107*   TBILIRUBIN 0.7 1.4 1.1 0.8     Recent Labs     09/03/24 0753 09/04/24 1034 09/05/24 0312 09/06/24  0119   RBC 2.12* 2.25* 1.98* 2.02*   HEMOGLOBIN 8.7* 9.1* 8.0* 8.0*   HEMATOCRIT 25.8* 27.4* 24.2* 24.7*   PLATELETCT 298 312 273 248   PROTHROMBTM  --  17.3* 17.8*  --    INR  --  1.39* 1.45*  --    IRON 134  --   --   --    FERRITIN 572.0*  --   --   --    TOTIRONBC see below  --   --   --      Recent Results (from the past 24 hour(s))   BILIRUBIN DIRECT    Collection Time: 09/06/24  1:19 AM   Result Value Ref Range    Direct Bilirubin 0.3 0.1 - 0.5 mg/dL   CBC WITH DIFFERENTIAL    Collection Time: 09/06/24  1:19 AM   Result Value Ref Range    WBC 5.1 4.8 - 10.8 K/uL    RBC 2.02 (L) 4.20 - 5.40 M/uL    Hemoglobin 8.0 (L) 12.0 - 16.0 g/dL    Hematocrit 24.7 (L) 37.0 - 47.0 %    .3 (H) 81.4 - 97.8 fL    MCH 39.6 (H) 27.0 - 33.0 pg    MCHC 32.4 32.2 - 35.5 g/dL    RDW 86.0 (H) 35.9 - 50.0 fL    Platelet Count 248 164 - 446 K/uL    MPV 10.0 9.0 - 12.9 fL    Neutrophils-Polys 35.40 (L) 44.00 - 72.00 %    Lymphocytes 51.50 (H) 22.00 - 41.00 %    Monocytes 8.80 0.00 - 13.40 %    Eosinophils 3.30 0.00 - 6.90 %    Basophils 0.80 0.00 - 1.80 %    Immature Granulocytes 0.20 0.00 - 0.90 %    Nucleated RBC 0.40 (H) 0.00 - 0.20 /100 WBC    Neutrophils (Absolute) 1.82 1.82 - 7.42 K/uL    Lymphs (Absolute) 2.64 1.00 - 4.80 K/uL    Monos (Absolute) 0.45 0.00 - 0.85 K/uL    Eos (Absolute) 0.17 0.00 - 0.51 K/uL    Baso (Absolute) 0.04 0.00 - 0.12 K/uL    Immature  Granulocytes (abs) 0.01 0.00 - 0.11 K/uL    NRBC (Absolute) 0.02 K/uL   Comp Metabolic Panel    Collection Time: 09/06/24  1:19 AM   Result Value Ref Range    Sodium 136 135 - 145 mmol/L    Potassium 4.0 3.6 - 5.5 mmol/L    Chloride 104 96 - 112 mmol/L    Co2 25 20 - 33 mmol/L    Anion Gap 7.0 7.0 - 16.0    Glucose 85 65 - 99 mg/dL    Bun 2 (L) 8 - 22 mg/dL    Creatinine 0.29 (L) 0.50 - 1.40 mg/dL    Calcium 8.2 (L) 8.5 - 10.5 mg/dL    Correct Calcium 9.6 8.5 - 10.5 mg/dL    AST(SGOT) 68 (H) 12 - 45 U/L    ALT(SGPT) 21 2 - 50 U/L    Alkaline Phosphatase 107 (H) 30 - 99 U/L    Total Bilirubin 0.8 0.1 - 1.5 mg/dL    Albumin 2.2 (L) 3.2 - 4.9 g/dL    Total Protein 5.1 (L) 6.0 - 8.2 g/dL    Globulin 2.9 1.9 - 3.5 g/dL    A-G Ratio 0.8 g/dL   BILIRUBIN INDIRECT    Collection Time: 09/06/24  1:19 AM   Result Value Ref Range    Indirect Bilirubin 0.5 0.0 - 1.0 mg/dL   ESTIMATED GFR    Collection Time: 09/06/24  1:19 AM   Result Value Ref Range    GFR (CKD-EPI) 147 >60 mL/min/1.73 m 2       Radiology Review:  US-LIVER AND VESSELS COMPLETE (COMBO)   Final Result      1.  Cirrhosis with portal hypertension      2.  Patent hepatic and portal veins      3.  Ascites      US-PARACENTESIS, ABD WITH IMAGING   Final Result      1. Ultrasound-guided therapeutic and diagnostic paracentesis of the right lower quadrant of the abdominal wall.      2. 580 mL of fluid withdrawn.      US-RUQ   Final Result         1.  Hepatomegaly   2.  Echogenic liver with irregular contour, compatible with changes of cirrhosis   3.  Gallbladder sludge   4.  Perihepatic ascites        MDM (Data Review):   -Records reviewed and summarized in current documentation  -I personally reviewed and interpreted the laboratory results  -I personally reviewed the radiology images    Assessment/Recommendations:  ASSESSMENT:  Decompensated Cirrhosis - history of fatty liver disease. Denies excessive alcohol intake although AST>ALT is more suggestive of alcohol intake  alongside macrocytic anemia.   Portal hypertension - Doppler US negative for Budd-Chiari, patent hepatic and portal veins. SAAG 2.4  Ascites - S/P para 580 mL removed, negative for SBP   GI bleeding with melena and BRBPR - mild portal hypertensive gastropathy and external hemorrhoids on bidirectional endoscopy  GERD  Chronic diarrhea - vitamin deficiency vs malnutrition vs eliac  Folic acid deficiency  Elevated INR  TSH, B12, lipid panel unremarkable  Ceruloplasmin low, ordered 24-hour urinary copper collection     RECOMMENDATIONS:  Folic acid replacement  2 g sodium restriction diet.  Diuresis with lasix, spironolactone- would increase dosages to Lasix 40mg daily, spironolactone 100mg daily.   LAY, ASMA, Mitochondrial Ab, Peth, ceruloplasmin, celiac, vitamin A and IgA quant pending  Ascites is reaccumulating, right upper quadrant ultrasound ordered  Continue ongoing urinary copper collection    Discussed with patient, RN, Dr. Hoffmann, Dr. Lopez    ..Leann Alvarado, DNP,  APRN    Core Quality Measures   Reviewed items::  Labs, Medications and Radiology reports reviewed

## 2024-09-07 ENCOUNTER — PHARMACY VISIT (OUTPATIENT)
Dept: PHARMACY | Facility: MEDICAL CENTER | Age: 29
End: 2024-09-07
Payer: COMMERCIAL

## 2024-09-07 VITALS
TEMPERATURE: 97.2 F | OXYGEN SATURATION: 99 % | DIASTOLIC BLOOD PRESSURE: 67 MMHG | BODY MASS INDEX: 22.21 KG/M2 | RESPIRATION RATE: 15 BRPM | SYSTOLIC BLOOD PRESSURE: 107 MMHG | HEART RATE: 59 BPM | WEIGHT: 130.07 LBS | HEIGHT: 64 IN

## 2024-09-07 PROBLEM — D68.9 COAGULOPATHY (HCC): Status: RESOLVED | Noted: 2024-09-04 | Resolved: 2024-09-07

## 2024-09-07 PROBLEM — I95.9 HYPOTENSION: Status: RESOLVED | Noted: 2024-09-06 | Resolved: 2024-09-07

## 2024-09-07 PROBLEM — R18.8 ASCITES: Status: RESOLVED | Noted: 2024-09-04 | Resolved: 2024-09-07

## 2024-09-07 LAB
ALBUMIN SERPL BCP-MCNC: 2.1 G/DL (ref 3.2–4.9)
ALBUMIN/GLOB SERPL: 0.7 G/DL
ALP SERPL-CCNC: 100 U/L (ref 30–99)
ALT SERPL-CCNC: 17 U/L (ref 2–50)
ANION GAP SERPL CALC-SCNC: 11 MMOL/L (ref 7–16)
AST SERPL-CCNC: 70 U/L (ref 12–45)
BILIRUB SERPL-MCNC: 0.6 MG/DL (ref 0.1–1.5)
BUN SERPL-MCNC: <2 MG/DL (ref 8–22)
CALCIUM ALBUM COR SERPL-MCNC: 9.6 MG/DL (ref 8.5–10.5)
CALCIUM SERPL-MCNC: 8.1 MG/DL (ref 8.5–10.5)
CHLORIDE SERPL-SCNC: 108 MMOL/L (ref 96–112)
CO2 SERPL-SCNC: 21 MMOL/L (ref 20–33)
CORTIS SERPL-MCNC: 14.4 UG/DL (ref 0–23)
CORTIS SERPL-MCNC: 17.6 UG/DL (ref 0–23)
CORTIS SERPL-MCNC: 17.7 UG/DL (ref 0–23)
CORTIS SERPL-MCNC: 3.9 UG/DL (ref 0–23)
CORTIS SERPL-MCNC: 9.2 UG/DL (ref 0–23)
CREAT SERPL-MCNC: 0.36 MG/DL (ref 0.5–1.4)
ERYTHROCYTE [DISTWIDTH] IN BLOOD BY AUTOMATED COUNT: 86.7 FL (ref 35.9–50)
GFR SERPLBLD CREATININE-BSD FMLA CKD-EPI: 139 ML/MIN/1.73 M 2
GLIADIN IGA SER IA-ACNC: <0.72 FLU (ref 0–4.99)
GLOBULIN SER CALC-MCNC: 3 G/DL (ref 1.9–3.5)
GLUCOSE SERPL-MCNC: 79 MG/DL (ref 65–99)
HCT VFR BLD AUTO: 26.1 % (ref 37–47)
HGB BLD-MCNC: 8.4 G/DL (ref 12–16)
IGA SERPL-MCNC: 228 MG/DL (ref 68–408)
INR PPP: 1.31 (ref 0.87–1.13)
MCH RBC QN AUTO: 40.2 PG (ref 27–33)
MCHC RBC AUTO-ENTMCNC: 32.2 G/DL (ref 32.2–35.5)
MCV RBC AUTO: 124.9 FL (ref 81.4–97.8)
PLATELET # BLD AUTO: 253 K/UL (ref 164–446)
PMV BLD AUTO: 10.1 FL (ref 9–12.9)
POTASSIUM SERPL-SCNC: 4.4 MMOL/L (ref 3.6–5.5)
PROT SERPL-MCNC: 5.1 G/DL (ref 6–8.2)
PROTHROMBIN TIME: 16.4 SEC (ref 12–14.6)
RBC # BLD AUTO: 2.09 M/UL (ref 4.2–5.4)
SODIUM SERPL-SCNC: 140 MMOL/L (ref 135–145)
TTG IGA SER IA-ACNC: <1.02 FLU (ref 0–4.99)
WBC # BLD AUTO: 4.7 K/UL (ref 4.8–10.8)

## 2024-09-07 PROCEDURE — 80053 COMPREHEN METABOLIC PANEL: CPT

## 2024-09-07 PROCEDURE — 85027 COMPLETE CBC AUTOMATED: CPT

## 2024-09-07 PROCEDURE — 700111 HCHG RX REV CODE 636 W/ 250 OVERRIDE (IP): Mod: JZ | Performed by: HOSPITALIST

## 2024-09-07 PROCEDURE — 36415 COLL VENOUS BLD VENIPUNCTURE: CPT

## 2024-09-07 PROCEDURE — RXMED WILLOW AMBULATORY MEDICATION CHARGE: Performed by: HOSPITALIST

## 2024-09-07 PROCEDURE — A9270 NON-COVERED ITEM OR SERVICE: HCPCS | Performed by: HOSPITALIST

## 2024-09-07 PROCEDURE — 82533 TOTAL CORTISOL: CPT

## 2024-09-07 PROCEDURE — 99239 HOSP IP/OBS DSCHRG MGMT >30: CPT | Performed by: HOSPITALIST

## 2024-09-07 PROCEDURE — 700102 HCHG RX REV CODE 250 W/ 637 OVERRIDE(OP): Performed by: HOSPITALIST

## 2024-09-07 PROCEDURE — 85610 PROTHROMBIN TIME: CPT

## 2024-09-07 PROCEDURE — 99232 SBSQ HOSP IP/OBS MODERATE 35: CPT | Performed by: NURSE PRACTITIONER

## 2024-09-07 PROCEDURE — A9270 NON-COVERED ITEM OR SERVICE: HCPCS

## 2024-09-07 PROCEDURE — 700102 HCHG RX REV CODE 250 W/ 637 OVERRIDE(OP)

## 2024-09-07 RX ORDER — M-VIT,TX,IRON,MINS/CALC/FOLIC 27MG-0.4MG
1 TABLET ORAL DAILY
Qty: 30 TABLET | Refills: 1 | Status: SHIPPED | OUTPATIENT
Start: 2024-09-08

## 2024-09-07 RX ORDER — LANOLIN ALCOHOL/MO/W.PET/CERES
100 CREAM (GRAM) TOPICAL DAILY
Qty: 30 TABLET | Refills: 0 | Status: SHIPPED | OUTPATIENT
Start: 2024-09-08 | End: 2024-10-08

## 2024-09-07 RX ORDER — SPIRONOLACTONE 25 MG/1
50 TABLET ORAL DAILY
Qty: 60 TABLET | Refills: 0 | Status: SHIPPED | OUTPATIENT
Start: 2024-09-08 | End: 2024-10-08

## 2024-09-07 RX ORDER — FERROUS SULFATE 325(65) MG
325 TABLET ORAL
Qty: 30 TABLET | Refills: 1 | Status: SHIPPED | OUTPATIENT
Start: 2024-09-08

## 2024-09-07 RX ORDER — PANTOPRAZOLE SODIUM 40 MG/1
40 TABLET, DELAYED RELEASE ORAL DAILY
Qty: 30 TABLET | Refills: 0 | Status: SHIPPED | OUTPATIENT
Start: 2024-09-07

## 2024-09-07 RX ORDER — FUROSEMIDE 20 MG
20 TABLET ORAL DAILY
Qty: 30 TABLET | Refills: 0 | Status: SHIPPED | OUTPATIENT
Start: 2024-09-08

## 2024-09-07 RX ORDER — FOLIC ACID 1 MG/1
1 TABLET ORAL DAILY
Qty: 30 TABLET | Refills: 1 | Status: SHIPPED | OUTPATIENT
Start: 2024-09-08

## 2024-09-07 RX ORDER — OXYCODONE HYDROCHLORIDE 5 MG/1
5 TABLET ORAL EVERY 6 HOURS PRN
Qty: 12 TABLET | Refills: 0 | Status: SHIPPED | OUTPATIENT
Start: 2024-09-07 | End: 2024-09-10

## 2024-09-07 RX ADMIN — Medication 1 TABLET: at 06:50

## 2024-09-07 RX ADMIN — OXYCODONE HYDROCHLORIDE 5 MG: 5 TABLET ORAL at 06:49

## 2024-09-07 RX ADMIN — FOLIC ACID 1 MG: 1 TABLET ORAL at 06:49

## 2024-09-07 RX ADMIN — COSYNTROPIN 250 MCG: 0.25 INJECTION, POWDER, LYOPHILIZED, FOR SOLUTION INTRAMUSCULAR; INTRAVENOUS at 06:52

## 2024-09-07 RX ADMIN — FERROUS SULFATE TAB 325 MG (65 MG ELEMENTAL FE) 325 MG: 325 (65 FE) TAB at 09:52

## 2024-09-07 RX ADMIN — OXYCODONE HYDROCHLORIDE 10 MG: 10 TABLET ORAL at 10:53

## 2024-09-07 RX ADMIN — OXYCODONE HYDROCHLORIDE 10 MG: 10 TABLET ORAL at 15:48

## 2024-09-07 RX ADMIN — Medication 100 MG: at 06:50

## 2024-09-07 RX ADMIN — ESCITALOPRAM OXALATE 10 MG: 10 TABLET ORAL at 06:49

## 2024-09-07 RX ADMIN — FAMOTIDINE 20 MG: 20 TABLET, FILM COATED ORAL at 06:50

## 2024-09-07 ASSESSMENT — FIBROSIS 4 INDEX: FIB4 SCORE: 1.8

## 2024-09-07 ASSESSMENT — ENCOUNTER SYMPTOMS
BLURRED VISION: 0
HEARTBURN: 0
CONSTIPATION: 0
SHORTNESS OF BREATH: 0
ABDOMINAL PAIN: 1
COUGH: 0
NERVOUS/ANXIOUS: 1
BACK PAIN: 0
DIARRHEA: 0
FEVER: 0
NAUSEA: 0
WEAKNESS: 0
DEPRESSION: 0
DIZZINESS: 0
VOMITING: 0
BLOOD IN STOOL: 0
CHILLS: 0

## 2024-09-07 ASSESSMENT — PAIN DESCRIPTION - PAIN TYPE
TYPE: ACUTE PAIN
TYPE: ACUTE PAIN

## 2024-09-07 ASSESSMENT — LIFESTYLE VARIABLES: SUBSTANCE_ABUSE: 1

## 2024-09-07 NOTE — PROGRESS NOTES
..Gastroenterology Progress Note               Author:  Leann Alvarado, DNP,  APRN Date & Time Created: 2024 7:35 AM       Patient ID:  Name:             Linda Carpenter    YOB: 1995  Age:                 28 y.o.  female  MRN:               [id number]    Medical Decision Making, by Problem:  Active Hospital Problems    Diagnosis     Hypotension [I95.9]     Gastroesophageal reflux disease [K21.9]     Anemia [D64.9]     Ascites [R18.8]     Coagulopathy (HCC) [D68.9]     Cirrhosis (HCC) [K74.60]      Presenting Chief Complaint:  anemia, melena, new onset cirrhosis     History of Present Illness:   This is a 28 year old female with a past medical history of fatty liver disease, GERD ( onset, currently managed with pantoprazole) who was transferred from [facility] ([place] to [facility] 2024 with concern for acute cholecystitis and new onset decompensated cirrhosis. At outside facility, CT abdomen pelvis with findings including hepatic steatosis, portal hypertension, gallbladder distended of unclear etiology, suggestion of wall thickening distal stomach raising concern for gastritis, Large amount of ascites, recanalization of umbilical vein. No splenomegaly.  Hemoglobin 8.9, hematocrit 25, Potassium 3, Albumin 2.8, AST 81. MELD 3.0 score there 9. UA consistent for UTI.      This morning, labs WBC 5.1, Hgb 8.7, Hct 25.8, .7, plt 298. Na 142, K 3.8, BUN 5, Cr 0.44, AST 75, ALT 22, , tbili 0.7, Albumin 2.4.  Hepatitis panel negative. No coagulation studies. RUQ ultrasound showed hepatomegaly, echogenic liver with irregular contour, compatible with cirrhosis, gallbladder sludge, and perihepatic ascites. IVC is patent with hepatopetal flow, MPV 1.04cm. NO choledocholithiasis- CBD 3.9mm.     2024: EGD/colonoscopy:  Mild portal hypertensive gastropathy otherwise normal upper endoscopy.  External hemorrhoids.  Bidirectional endoscopy  otherwise normal.    Interval History:  9/5/2024: Patient seen and examined.  Endorses a 3-year history of occasional nausea, vomiting, tremors, and a 15 to 20 pound weight loss over the last 3 to 4 months alongside near constant diarrhea with right upper quadrant abdominal pain.  Also reports onset of tremors Had 2 liquid bowel movements overnight.  Tolerating her diet with no nausea currently.  Labs reviewed.    I had a zaira discussion with her about her newly diagnosed decompensated cirrhosis.  She was tearful and honestly admitted that she had been drinking a lot more than she previously reported.  She has been under a lot of stress lately but recognizes that she has a 9 and a 3-year-old that she wants to live for.  We talked at length about AA, resources to help her stay sober, cirrhotic management, and how to manage the side effects of portal hypertension including ascites.  This is the first time she has been told that she has liver cirrhosis or hospitalized for decompensated cirrhosis.     MELD 3.0 today - 13    Addendum 1600: ceruloplasmin resulted low at 12. 24 hour Urinary copper, LDH, haptoglobin ordered. CBC with diff, CMP, fractionated bili in am.    9/6/2024: Patient seen and examined.  Reporting significant abdominal discomfort secondary to ascites.  Abdominal ultrasound ordered to assess for paracentesis.  24-hour urinary copper collection in progress.    9/7/2024: Patient seen.  Feeling okay, does not have a great appetite but does not like her food.  Reviewed labs and plan of care postdischarge.Peth elevated consistent with recent heavy alcohol use. Minimal ascites on ultrasound yesterday.  After further discussion with patient, she endorses that over the past several years she has had bilateral hand numbness and tingling which she says moved to her feet and upper legs causing her to have difficulty with walking.    MELD 3.0 today - 12    Hospital Medications:  Current Facility-Administered  Medications   Medication Dose Frequency Provider Last Rate Last Admin    carboxymethylcellulose (Refresh Tears) 0.5 % ophthalmic drops 1 Drop  1 Drop PRN Joni Sullivan M.D.        calcium carbonate (Tums) chewable tab 500 mg  500 mg 4X/DAY PRN ROSARIO Palma.P.R.NSee        famotidine (Pepcid) tablet 20 mg  20 mg BID ROSARIO Palma.P.R.N.   20 mg at 09/07/24 0650    escitalopram (Lexapro) tablet 10 mg  10 mg DAILY Joni Sullivan M.D.   10 mg at 09/07/24 0649    folic acid (Folvite) tablet 1 mg  1 mg DAILY Joni Sullivan M.D.   1 mg at 09/07/24 0649    thiamine (Vitamin B-1) tablet 100 mg  100 mg DAILY Joni Sullivan M.D.   100 mg at 09/07/24 0650    therapeutic multivitamin-minerals (Theragran-M) tablet 1 Tablet  1 Tablet DAILY Joni Sullivan M.D.   1 Tablet at 09/07/24 0650    oxyCODONE immediate-release (Roxicodone) tablet 5 mg  5 mg Q4HRS PRN Joni Sullivan M.D.   5 mg at 09/07/24 0649    oxyCODONE immediate release (Roxicodone) tablet 10 mg  10 mg Q4HRS PRN Joni Sullivan M.D.   10 mg at 09/06/24 1838    ferrous sulfate tablet 325 mg  325 mg QDAY with Breakfast Joni Sullivan M.D.   325 mg at 09/06/24 0810    spironolactone (Aldactone) tablet 25 mg  25 mg Q DAY Rodger Painting M.D.   25 mg at 09/05/24 0501    furosemide (Lasix) tablet 20 mg  20 mg Q DAY Rodger Painting M.D.   20 mg at 09/05/24 0500    ondansetron (Zofran) syringe/vial injection 4 mg  4 mg Q4HRS PRN Joni Sullivan M.D.   4 mg at 09/06/24 2058   Last reviewed on 9/4/2024  7:02 AM by Radha Ashley R.N.       Review of Systems:  Review of Systems   Constitutional:  Negative for chills, fever and malaise/fatigue.   HENT:  Negative for hearing loss.    Eyes:  Negative for blurred vision.   Respiratory:  Negative for cough and shortness of breath.    Cardiovascular:  Negative for chest pain and leg swelling.   Gastrointestinal:  Positive for abdominal pain. Negative for  "blood in stool, constipation, diarrhea, heartburn, melena, nausea and vomiting.   Genitourinary:  Negative for dysuria.   Musculoskeletal:  Negative for back pain.   Skin:  Negative for rash.   Neurological:  Negative for dizziness and weakness.   Psychiatric/Behavioral:  Positive for substance abuse. Negative for depression. The patient is nervous/anxious.    All other systems reviewed and are negative.    Vital signs:  Weight/BMI: Body mass index is 22.33 kg/m².  /67   Pulse (!) 59   Temp 36.2 °C (97.2 °F) (Temporal)   Resp 15   Ht 1.626 m (5' 4\")   Wt 59 kg (130 lb 1.1 oz)   SpO2 99%   Vitals:    09/06/24 1519 09/06/24 1906 09/07/24 0416 09/07/24 0708   BP: 102/67 (!) 84/64 (!) 86/55 107/67   Pulse: 71 73 60 (!) 59   Resp: 16 14 16 15   Temp: 36.3 °C (97.4 °F) 36.6 °C (97.8 °F) 35.9 °C (96.7 °F) 36.2 °C (97.2 °F)   TempSrc: Temporal Temporal Temporal Temporal   SpO2: 91% 96% 92% 99%   Weight:   59 kg (130 lb 1.1 oz)    Height:         Oxygen Therapy:  Pulse Oximetry: 99 %, O2 (LPM): 0, O2 Delivery Device: None - Room Air    Intake/Output Summary (Last 24 hours) at 9/7/2024 0735  Last data filed at 9/6/2024 2028  Gross per 24 hour   Intake 1940 ml   Output 300 ml   Net 1640 ml         Physical Exam:  Physical Exam  Vitals and nursing note reviewed.   Constitutional:       General: She is not in acute distress.     Appearance: Normal appearance.   HENT:      Head: Normocephalic and atraumatic.      Right Ear: External ear normal.      Left Ear: External ear normal.      Nose: Nose normal.      Mouth/Throat:      Mouth: Mucous membranes are moist.      Pharynx: Oropharynx is clear.   Eyes:      General: No scleral icterus.  Cardiovascular:      Rate and Rhythm: Normal rate and regular rhythm.      Pulses: Normal pulses.      Heart sounds: Normal heart sounds.   Pulmonary:      Effort: Pulmonary effort is normal. No respiratory distress.      Breath sounds: Normal breath sounds.   Abdominal:      General: " Bowel sounds are normal. There is distension.      Tenderness: There is abdominal tenderness.   Musculoskeletal:         General: Normal range of motion.      Cervical back: Normal range of motion.   Skin:     General: Skin is warm and dry.      Capillary Refill: Capillary refill takes less than 2 seconds.      Coloration: Skin is pale.   Neurological:      Mental Status: She is alert and oriented to person, place, and time.   Psychiatric:         Mood and Affect: Mood normal.         Behavior: Behavior normal.         Labs:  Recent Labs     09/05/24 0312 09/06/24 0119 09/07/24 0602   SODIUM 139 136 140   POTASSIUM 4.0 4.0 4.4   CHLORIDE 106 104 108   CO2 25 25 21   BUN 2* 2* <2*   CREATININE 0.35* 0.29* 0.36*   MAGNESIUM 1.6  --   --    PHOSPHORUS 4.4  --   --    CALCIUM 7.6* 8.2* 8.1*     Recent Labs     09/05/24 0312 09/06/24 0119 09/07/24 0602   ALTSGPT 20 21 17   ASTSGOT 57* 68* 70*   ALKPHOSPHAT 104* 107* 100*   TBILIRUBIN 1.1 0.8 0.6   DBILIRUBIN  --  0.3  --    GLUCOSE 77 85 79     Recent Labs     09/05/24 0312 09/06/24 0119 09/07/24  0602   WBC 5.8 5.1 4.7*   NEUTSPOLYS  --  35.40*  --    LYMPHOCYTES  --  51.50*  --    MONOCYTES  --  8.80  --    EOSINOPHILS  --  3.30  --    BASOPHILS  --  0.80  --    ASTSGOT 57* 68* 70*   ALTSGPT 20 21 17   ALKPHOSPHAT 104* 107* 100*   TBILIRUBIN 1.1 0.8 0.6     Recent Labs     09/04/24  1034 09/05/24 0312 09/06/24  0119 09/07/24  0602   RBC 2.25* 1.98* 2.02* 2.09*   HEMOGLOBIN 9.1* 8.0* 8.0* 8.4*   HEMATOCRIT 27.4* 24.2* 24.7* 26.1*   PLATELETCT 312 273 248 253   PROTHROMBTM 17.3* 17.8*  --  16.4*   INR 1.39* 1.45*  --  1.31*     Recent Results (from the past 24 hour(s))   CORTISOL    Collection Time: 09/06/24  8:15 AM   Result Value Ref Range    Cortisol 6.2 0.0 - 23.0 ug/dL   COPPER, URINE 24 HR OR RANDOM    Collection Time: 09/06/24  4:37 PM   Result Value Ref Range    Total Volume 1300mL mL    Collection Length 24 Hour Hrs   Prothrombin Time    Collection  Time: 09/07/24  6:02 AM   Result Value Ref Range    PT 16.4 (H) 12.0 - 14.6 sec    INR 1.31 (H) 0.87 - 1.13   Comp Metabolic Panel    Collection Time: 09/07/24  6:02 AM   Result Value Ref Range    Sodium 140 135 - 145 mmol/L    Potassium 4.4 3.6 - 5.5 mmol/L    Chloride 108 96 - 112 mmol/L    Co2 21 20 - 33 mmol/L    Anion Gap 11.0 7.0 - 16.0    Glucose 79 65 - 99 mg/dL    Bun <2 (L) 8 - 22 mg/dL    Creatinine 0.36 (L) 0.50 - 1.40 mg/dL    Calcium 8.1 (L) 8.5 - 10.5 mg/dL    Correct Calcium 9.6 8.5 - 10.5 mg/dL    AST(SGOT) 70 (H) 12 - 45 U/L    ALT(SGPT) 17 2 - 50 U/L    Alkaline Phosphatase 100 (H) 30 - 99 U/L    Total Bilirubin 0.6 0.1 - 1.5 mg/dL    Albumin 2.1 (L) 3.2 - 4.9 g/dL    Total Protein 5.1 (L) 6.0 - 8.2 g/dL    Globulin 3.0 1.9 - 3.5 g/dL    A-G Ratio 0.7 g/dL   CBC WITHOUT DIFFERENTIAL    Collection Time: 09/07/24  6:02 AM   Result Value Ref Range    WBC 4.7 (L) 4.8 - 10.8 K/uL    RBC 2.09 (L) 4.20 - 5.40 M/uL    Hemoglobin 8.4 (L) 12.0 - 16.0 g/dL    Hematocrit 26.1 (L) 37.0 - 47.0 %    .9 (H) 81.4 - 97.8 fL    MCH 40.2 (H) 27.0 - 33.0 pg    MCHC 32.2 32.2 - 35.5 g/dL    RDW 86.7 (H) 35.9 - 50.0 fL    Platelet Count 253 164 - 446 K/uL    MPV 10.1 9.0 - 12.9 fL   CORTISOL    Collection Time: 09/07/24  6:02 AM   Result Value Ref Range    Cortisol 3.9 0.0 - 23.0 ug/dL   ESTIMATED GFR    Collection Time: 09/07/24  6:02 AM   Result Value Ref Range    GFR (CKD-EPI) 139 >60 mL/min/1.73 m 2       Radiology Review:  US-ABDOMEN LTD (SOFT TISSUE)   Final Result      Minimal ascites.  Insufficient for safe paracentesis.      US-LIVER AND VESSELS COMPLETE (COMBO)   Final Result      1.  Cirrhosis with portal hypertension      2.  Patent hepatic and portal veins      3.  Ascites      US-PARACENTESIS, ABD WITH IMAGING   Final Result      1. Ultrasound-guided therapeutic and diagnostic paracentesis of the right lower quadrant of the abdominal wall.      2. 580 mL of fluid withdrawn.      US-RUQ   Final  Result         1.  Hepatomegaly   2.  Echogenic liver with irregular contour, compatible with changes of cirrhosis   3.  Gallbladder sludge   4.  Perihepatic ascites        MDM (Data Review):   -Records reviewed and summarized in current documentation  -I personally reviewed and interpreted the laboratory results  -I personally reviewed the radiology images    Assessment/Recommendations:  ASSESSMENT:  Decompensated Cirrhosis - history of fatty liver disease. Denies excessive alcohol intake although AST>ALT is more suggestive of alcohol intake alongside macrocytic anemia.   Portal hypertension - Doppler US negative for Budd-Chiari, patent hepatic and portal veins. SAAG 2.4  Ascites - S/P para 580 mL removed, negative for SBP   GI bleeding with melena and BRBPR - mild portal hypertensive gastropathy and external hemorrhoids on bidirectional endoscopy  GERD  Chronic diarrhea - vitamin deficiency vs malnutrition vs eliac  Folic acid deficiency  Elevated INR  TSH, B12, lipid panel unremarkable  Ceruloplasmin low, ordered 24-hour urinary copper collection   LAY, ASMA, mitochondrial antibody negative  Elevated Peth consistent with recent alcohol use    RECOMMENDATIONS:  Folic acid replacement  2 g sodium restriction diet.  Diuresis with lasix, spironolactone- would increase spironolactone to 50 mg daily and continue Lasix 20mg daily.  Spironolactone is vitally important in the management of ascites in a cirrhotic patient  Urinary copper, ceruloplasmin, celiac, vitamin A and IgA quant pending  GI will follow results and notify patient    Extensive discussion again with patient about the importance of absolute alcohol cessation, following a low-sodium diet, taking her diuretics, and outpatient GI follow-up.      Discussed with patient, RN, Dr. Hoffmann, Dr. Fountain    ..Leann Alvarado, DNP,  APRN    Core Quality Measures   Reviewed items::  Labs, Medications and Radiology reports reviewed

## 2024-09-07 NOTE — DISCHARGE SUMMARY
Discharge Summary    CHIEF COMPLAINT ON ADMISSION  No chief complaint on file.      Reason for Admission  Acute cholecystitis, ascites,     Admission Date  9/2/2024    CODE STATUS  Full Code    HPI & HOSPITAL COURSE    Please see original H&P and consult notes for specific information  Linda Carpenter is a 28 y.o. female who presented 9/2/2024 with abdominal distention and pain for few weeks with associated nausea and vomiting.  Patient reports occasionally drinking alcohol.  Denies any chest pain, fever, chills.  In the ER at outside facility CT abdomen pelvis showed hepatic steatosis, portal hypertension, gallbladder distended of unclear etiology suggestion of wall thickening distal stomach raising concern for gastritis.  Patient transferred here for GI evaluation.     9/4 patient is resting in bed, she had EGD colonoscopy today no active bleeding, iron levels are low, will start her iron supplementation, note from GI reviewed, follow-up autoimmune hepatitis panel, discussed with bedside nurse charge nurse , patient's blood pressure is borderline low, will continue close monitoring, no signs of active bleeding, patient tolerating diet.  Patient is alert oriented follows commands she is able to express understanding and she is able to speak in full sentences all question have been answered.  9/5 patient is resting in bed, she is alert oriented follows commands, she had mild abdominal pain, she is able to urinate and having bowel movements, no focal weakness no numbness or tingling, discussed with GI will continue monitoring and waiting for cirrhosis workup, later today ceruloplasmin levels were low started on urinary collection, autoimmune hepatitis panel negative so far, discussed with bedside nurse charge nurse  pharmacist, all questions been answered.  9/6 patient is resting in bed, she denies any new complaints, pain is stable, patient having hypotension, orthostatic vital signs  ordered, will hold diuretics for now, ultrasound abdomen did not show significant ascites, discussed with GI team, continue close monitoring, discussed with bedside nurse charge nurse  pharmacist.    Patient is feeling better today blood pressure is improved, she denies any dizziness lightheadedness, she is tolerating diet, pain is well-controlled, discussed with GI patient has been cleared for discharge and GI will follow-up with results and notify patient, I have sent referral for GI as outpatient, patient is oriented and will continue on Lasix and spironolactone patient understand that she needs to avoid alcohol smoking drugs, I discussed with patient patient's family regarding discharge plan, all question have been answered.      Therefore, she is discharged in good and stable condition to home with close outpatient follow-up.    The patient met 2-midnight criteria for an inpatient stay at the time of discharge.    Discharge Date  9/7/24    FOLLOW UP ITEMS POST DISCHARGE  Primary care physician  GI    DISCHARGE DIAGNOSES  Principal Problem:    Cirrhosis (HCC) (POA: Yes)  Active Problems:    Gastroesophageal reflux disease (POA: Yes)    Anemia (POA: Yes)  Resolved Problems:    Ascites (POA: Yes)    Coagulopathy (HCC) (POA: Unknown)    Hypotension (POA: Unknown)      FOLLOW UP  No future appointments.  [facility]  [address] [facility] [address] [phone]  Follow up      [facility]  [address] [facility] [address] [phone]  Follow up      [facility]  [address] [facility] [address]  [phone]  Follow up        MEDICATIONS ON DISCHARGE     Medication List        START taking these medications        Instructions   ferrous sulfate 325 (65 Fe) MG tablet  Start taking on: September 8, 2024   Take 1 Tablet by mouth every morning with breakfast.  Dose: 325 mg     folic acid 1 MG Tabs  Start taking  on: September 8, 2024  Commonly known as: Folvite   Take 1 Tablet by mouth every day.  Dose: 1 mg     furosemide 20 MG Tabs  Start taking on: September 8, 2024  Commonly known as: Lasix   Take 1 Tablet by mouth every day.  Dose: 20 mg     oxyCODONE immediate-release 5 MG Tabs  Commonly known as: Roxicodone   Take 1 Tablet by mouth every 6 hours as needed for Severe Pain for up to 3 days.  Dose: 5 mg     spironolactone 25 MG Tabs  Start taking on: September 8, 2024  Commonly known as: Aldactone   Take 2 Tablets by mouth every day for 30 days.  Dose: 50 mg     therapeutic multivitamin-minerals Tabs  Start taking on: September 8, 2024   Take 1 Tablet by mouth every day.  Dose: 1 Tablet     thiamine 100 MG tablet  Start taking on: September 8, 2024  Commonly known as: Thiamine   Take 1 Tablet by mouth every day for 30 days.  Dose: 100 mg            CONTINUE taking these medications        Instructions   escitalopram 10 MG Tabs  Commonly known as: Lexapro   Take 10 mg by mouth every day.  Dose: 10 mg     pantoprazole 40 MG Tbec  Commonly known as: Protonix   Take 1 Tablet by mouth every day.  Dose: 40 mg              Allergies  Allergies   Allergen Reactions    Ceclor [Cefaclor] Rash     RXN= as baby       DIET  Orders Placed This Encounter   Procedures    Diet Order Diet: 2 Gram Sodium     Standing Status:   Standing     Number of Occurrences:   1     Order Specific Question:   Diet:     Answer:   2 Gram Sodium [7]       ACTIVITY  As tolerated.  Weight bearing as tolerated    CONSULTATIONS  GI    PROCEDURES  EGD colonoscopy    LABORATORY  Lab Results   Component Value Date    SODIUM 140 09/07/2024    POTASSIUM 4.4 09/07/2024    CHLORIDE 108 09/07/2024    CO2 21 09/07/2024    GLUCOSE 79 09/07/2024    BUN <2 (L) 09/07/2024    CREATININE 0.36 (L) 09/07/2024        Lab Results   Component Value Date    WBC 4.7 (L) 09/07/2024    HEMOGLOBIN 8.4 (L) 09/07/2024    HEMATOCRIT 26.1 (L) 09/07/2024    PLATELETCT 253 09/07/2024         Total time of the discharge process exceeds 33 minutes.

## 2024-09-07 NOTE — CARE PLAN
The patient is Stable - Low risk of patient condition declining or worsening    Shift Goals  Clinical Goals: Labs  Patient Goals: Labs  Family Goals: GLORY    Progress made toward(s) clinical / shift goals:  cortisol every hour.    Patient is not progressing towards the following goals:

## 2024-09-07 NOTE — CARE PLAN
"The patient is Stable - Low risk of patient condition declining or worsening    Shift Goals  Clinical Goals: administer cosyntropin in morning and do labs, rest, pain  Patient Goals: \"nothing really\"  Family Goals: GLORY    Progress made toward(s) clinical / shift goals:   Pt 's pain has been controlled with monitoring and medication per MAR this shift. Non-pharmacological methods used heat. Labs monitored this shift    Problem: Pain - Standard  Goal: Alleviation of pain or a reduction in pain to the patient’s comfort goal  Outcome: Progressing     Problem: Knowledge Deficit - Standard  Goal: Patient and family/care givers will demonstrate understanding of plan of care, disease process/condition, diagnostic tests and medications  Outcome: Progressing     Patient is not progressing towards the following goals:      "

## 2024-09-07 NOTE — PROGRESS NOTES
Assumed care of patient at 1900 from day RN. Patient is A&O x 4. Bed locked in the lowest position, 2 side rails up, call light is within reach, belongings at bedside. Pt reports pain level of 0 /10. Mobility up self. Explained plan of care and safety precautions to pt, pt has no questions at this time. Hourly rounding is in place.

## 2024-09-08 LAB
BACTERIA FLD AEROBE CULT: ABNORMAL
BACTERIA FLD AEROBE CULT: ABNORMAL
GLIADIN IGG SER IA-ACNC: <0.56 FLU (ref 0–4.99)
GRAM STN SPEC: ABNORMAL
HAPTOGLOB SERPL-MCNC: <10 MG/DL (ref 30–200)
SIGNIFICANT IND 70042: ABNORMAL
SITE SITE: ABNORMAL
SOURCE SOURCE: ABNORMAL
TTG IGG SER IA-ACNC: <0.82 FLU (ref 0–4.99)

## 2024-09-09 LAB
ANNOTATION COMMENT IMP: ABNORMAL
RETINYL PALMITATE SERPL-MCNC: <0.02 MG/L (ref 0–0.1)
VIT A SERPL-MCNC: 0.06 MG/L (ref 0.3–1.2)

## 2024-09-10 ENCOUNTER — TELEPHONE (OUTPATIENT)
Dept: HEALTH INFORMATION MANAGEMENT | Facility: OTHER | Age: 29
End: 2024-09-10
Payer: MEDICAID

## 2024-09-11 LAB
COLLECT DURATION TIME SPEC: 24 HR
COPPER 24H UR-MRATE: NORMAL UG/D (ref 3–45)
COPPER ?TM UR-MCNC: <1 UG/DL
COPPER/CREAT 24H UR: NORMAL UG/G CRT (ref 10–45)
CREAT 24H UR-MCNC: 37 MG/DL
SPECIMEN VOL ?TM UR: 1300 ML

## 2024-10-10 ENCOUNTER — APPOINTMENT (OUTPATIENT)
Dept: RADIOLOGY | Facility: MEDICAL CENTER | Age: 29
End: 2024-10-10
Attending: STUDENT IN AN ORGANIZED HEALTH CARE EDUCATION/TRAINING PROGRAM
Payer: MEDICAID

## 2024-10-10 ENCOUNTER — HOSPITAL ENCOUNTER (EMERGENCY)
Facility: MEDICAL CENTER | Age: 29
End: 2024-10-11
Attending: STUDENT IN AN ORGANIZED HEALTH CARE EDUCATION/TRAINING PROGRAM
Payer: MEDICAID

## 2024-10-10 DIAGNOSIS — N39.0 ACUTE UTI: ICD-10-CM

## 2024-10-10 LAB
ALBUMIN SERPL BCP-MCNC: 3.7 G/DL (ref 3.2–4.9)
ALBUMIN/GLOB SERPL: 1.3 G/DL
ALP SERPL-CCNC: 81 U/L (ref 30–99)
ALT SERPL-CCNC: 18 U/L (ref 2–50)
ANION GAP SERPL CALC-SCNC: 13 MMOL/L (ref 7–16)
APPEARANCE UR: ABNORMAL
AST SERPL-CCNC: 29 U/L (ref 12–45)
BACTERIA #/AREA URNS HPF: ABNORMAL /HPF
BASOPHILS # BLD AUTO: 0.6 % (ref 0–1.8)
BASOPHILS # BLD: 0.04 K/UL (ref 0–0.12)
BILIRUB SERPL-MCNC: 0.2 MG/DL (ref 0.1–1.5)
BILIRUB UR QL STRIP.AUTO: NEGATIVE
BUN SERPL-MCNC: 9 MG/DL (ref 8–22)
CALCIUM ALBUM COR SERPL-MCNC: 9.5 MG/DL (ref 8.5–10.5)
CALCIUM SERPL-MCNC: 9.3 MG/DL (ref 8.5–10.5)
CHLORIDE SERPL-SCNC: 106 MMOL/L (ref 96–112)
CO2 SERPL-SCNC: 22 MMOL/L (ref 20–33)
COLOR UR: YELLOW
CREAT SERPL-MCNC: 0.5 MG/DL (ref 0.5–1.4)
EOSINOPHIL # BLD AUTO: 0.27 K/UL (ref 0–0.51)
EOSINOPHIL NFR BLD: 4 % (ref 0–6.9)
EPI CELLS #/AREA URNS HPF: ABNORMAL /HPF
ERYTHROCYTE [DISTWIDTH] IN BLOOD BY AUTOMATED COUNT: 56.3 FL (ref 35.9–50)
GFR SERPLBLD CREATININE-BSD FMLA CKD-EPI: 129 ML/MIN/1.73 M 2
GLOBULIN SER CALC-MCNC: 2.9 G/DL (ref 1.9–3.5)
GLUCOSE SERPL-MCNC: 93 MG/DL (ref 65–99)
GLUCOSE UR STRIP.AUTO-MCNC: NEGATIVE MG/DL
HCG SERPL QL: NEGATIVE
HCT VFR BLD AUTO: 34.5 % (ref 37–47)
HGB BLD-MCNC: 11.1 G/DL (ref 12–16)
HYALINE CASTS #/AREA URNS LPF: ABNORMAL /LPF
IMM GRANULOCYTES # BLD AUTO: 0.01 K/UL (ref 0–0.11)
IMM GRANULOCYTES NFR BLD AUTO: 0.1 % (ref 0–0.9)
INR PPP: 1.07 (ref 0.87–1.13)
KETONES UR STRIP.AUTO-MCNC: NEGATIVE MG/DL
LEUKOCYTE ESTERASE UR QL STRIP.AUTO: ABNORMAL
LIPASE SERPL-CCNC: 80 U/L (ref 11–82)
LYMPHOCYTES # BLD AUTO: 2.3 K/UL (ref 1–4.8)
LYMPHOCYTES NFR BLD: 34.4 % (ref 22–41)
MCH RBC QN AUTO: 32.8 PG (ref 27–33)
MCHC RBC AUTO-ENTMCNC: 32.2 G/DL (ref 32.2–35.5)
MCV RBC AUTO: 102.1 FL (ref 81.4–97.8)
MICRO URNS: ABNORMAL
MONOCYTES # BLD AUTO: 0.46 K/UL (ref 0–0.85)
MONOCYTES NFR BLD AUTO: 6.9 % (ref 0–13.4)
NEUTROPHILS # BLD AUTO: 3.61 K/UL (ref 1.82–7.42)
NEUTROPHILS NFR BLD: 54 % (ref 44–72)
NITRITE UR QL STRIP.AUTO: NEGATIVE
NRBC # BLD AUTO: 0 K/UL
NRBC BLD-RTO: 0 /100 WBC (ref 0–0.2)
PH UR STRIP.AUTO: 7 [PH] (ref 5–8)
PLATELET # BLD AUTO: 299 K/UL (ref 164–446)
PMV BLD AUTO: 10.5 FL (ref 9–12.9)
POTASSIUM SERPL-SCNC: 3.9 MMOL/L (ref 3.6–5.5)
PROT SERPL-MCNC: 6.6 G/DL (ref 6–8.2)
PROT UR QL STRIP: NEGATIVE MG/DL
PROTHROMBIN TIME: 14 SEC (ref 12–14.6)
RBC # BLD AUTO: 3.38 M/UL (ref 4.2–5.4)
RBC # URNS HPF: ABNORMAL /HPF
RBC UR QL AUTO: NEGATIVE
SODIUM SERPL-SCNC: 141 MMOL/L (ref 135–145)
SP GR UR STRIP.AUTO: 1.02
UROBILINOGEN UR STRIP.AUTO-MCNC: 0.2 MG/DL
WBC # BLD AUTO: 6.7 K/UL (ref 4.8–10.8)
WBC #/AREA URNS HPF: ABNORMAL /HPF

## 2024-10-10 PROCEDURE — 85025 COMPLETE CBC W/AUTO DIFF WBC: CPT

## 2024-10-10 PROCEDURE — 96375 TX/PRO/DX INJ NEW DRUG ADDON: CPT

## 2024-10-10 PROCEDURE — 96374 THER/PROPH/DIAG INJ IV PUSH: CPT | Mod: XU

## 2024-10-10 PROCEDURE — 74177 CT ABD & PELVIS W/CONTRAST: CPT

## 2024-10-10 PROCEDURE — 700111 HCHG RX REV CODE 636 W/ 250 OVERRIDE (IP): Mod: JZ,UD | Performed by: STUDENT IN AN ORGANIZED HEALTH CARE EDUCATION/TRAINING PROGRAM

## 2024-10-10 PROCEDURE — 85610 PROTHROMBIN TIME: CPT

## 2024-10-10 PROCEDURE — 81001 URINALYSIS AUTO W/SCOPE: CPT

## 2024-10-10 PROCEDURE — 36415 COLL VENOUS BLD VENIPUNCTURE: CPT

## 2024-10-10 PROCEDURE — 99285 EMERGENCY DEPT VISIT HI MDM: CPT

## 2024-10-10 PROCEDURE — 700117 HCHG RX CONTRAST REV CODE 255: Mod: UD | Performed by: STUDENT IN AN ORGANIZED HEALTH CARE EDUCATION/TRAINING PROGRAM

## 2024-10-10 PROCEDURE — 80053 COMPREHEN METABOLIC PANEL: CPT

## 2024-10-10 PROCEDURE — 84703 CHORIONIC GONADOTROPIN ASSAY: CPT

## 2024-10-10 PROCEDURE — 83690 ASSAY OF LIPASE: CPT

## 2024-10-10 RX ORDER — MORPHINE SULFATE 4 MG/ML
4 INJECTION INTRAVENOUS ONCE
Status: COMPLETED | OUTPATIENT
Start: 2024-10-10 | End: 2024-10-10

## 2024-10-10 RX ORDER — ONDANSETRON 2 MG/ML
4 INJECTION INTRAMUSCULAR; INTRAVENOUS ONCE
Status: COMPLETED | OUTPATIENT
Start: 2024-10-10 | End: 2024-10-10

## 2024-10-10 RX ORDER — MORPHINE SULFATE 4 MG/ML
4 INJECTION INTRAVENOUS
Status: DISCONTINUED | OUTPATIENT
Start: 2024-10-10 | End: 2024-10-11 | Stop reason: HOSPADM

## 2024-10-10 RX ADMIN — IOHEXOL 100 ML: 350 INJECTION, SOLUTION INTRAVENOUS at 20:30

## 2024-10-10 RX ADMIN — MORPHINE SULFATE 4 MG: 4 INJECTION, SOLUTION INTRAMUSCULAR; INTRAVENOUS at 20:05

## 2024-10-10 RX ADMIN — ONDANSETRON 4 MG: 2 INJECTION INTRAMUSCULAR; INTRAVENOUS at 20:05

## 2024-10-10 ASSESSMENT — FIBROSIS 4 INDEX: FIB4 SCORE: 2.01

## 2024-10-11 VITALS
WEIGHT: 130 LBS | DIASTOLIC BLOOD PRESSURE: 72 MMHG | TEMPERATURE: 97.8 F | HEIGHT: 64 IN | HEART RATE: 97 BPM | RESPIRATION RATE: 20 BRPM | SYSTOLIC BLOOD PRESSURE: 111 MMHG | BODY MASS INDEX: 22.2 KG/M2 | OXYGEN SATURATION: 93 %

## 2024-10-11 LAB
APPEARANCE UR: CLEAR
BACTERIA #/AREA URNS HPF: ABNORMAL /HPF
BILIRUB UR QL STRIP.AUTO: NEGATIVE
COLOR UR: YELLOW
EPI CELLS #/AREA URNS HPF: ABNORMAL /HPF
GLUCOSE UR STRIP.AUTO-MCNC: NEGATIVE MG/DL
HYALINE CASTS #/AREA URNS LPF: ABNORMAL /LPF
KETONES UR STRIP.AUTO-MCNC: NEGATIVE MG/DL
LEUKOCYTE ESTERASE UR QL STRIP.AUTO: NEGATIVE
MICRO URNS: ABNORMAL
NITRITE UR QL STRIP.AUTO: POSITIVE
PH UR STRIP.AUTO: 7 [PH] (ref 5–8)
PROT UR QL STRIP: NEGATIVE MG/DL
RBC # URNS HPF: ABNORMAL /HPF
RBC UR QL AUTO: NEGATIVE
SP GR UR STRIP.AUTO: 1.01
UROBILINOGEN UR STRIP.AUTO-MCNC: 0.2 MG/DL
WBC #/AREA URNS HPF: ABNORMAL /HPF

## 2024-10-11 PROCEDURE — 700111 HCHG RX REV CODE 636 W/ 250 OVERRIDE (IP): Mod: JZ,UD | Performed by: STUDENT IN AN ORGANIZED HEALTH CARE EDUCATION/TRAINING PROGRAM

## 2024-10-11 PROCEDURE — 96376 TX/PRO/DX INJ SAME DRUG ADON: CPT

## 2024-10-11 PROCEDURE — 96375 TX/PRO/DX INJ NEW DRUG ADDON: CPT

## 2024-10-11 RX ORDER — SULFAMETHOXAZOLE AND TRIMETHOPRIM 800; 160 MG/1; MG/1
1 TABLET ORAL 2 TIMES DAILY
Qty: 14 TABLET | Refills: 0 | Status: ACTIVE | OUTPATIENT
Start: 2024-10-11 | End: 2024-10-11

## 2024-10-11 RX ORDER — KETOROLAC TROMETHAMINE 15 MG/ML
15 INJECTION, SOLUTION INTRAMUSCULAR; INTRAVENOUS ONCE
Status: COMPLETED | OUTPATIENT
Start: 2024-10-11 | End: 2024-10-11

## 2024-10-11 RX ORDER — NITROFURANTOIN 25; 75 MG/1; MG/1
100 CAPSULE ORAL ONCE
Status: DISCONTINUED | OUTPATIENT
Start: 2024-10-11 | End: 2024-10-11

## 2024-10-11 RX ORDER — SULFAMETHOXAZOLE AND TRIMETHOPRIM 800; 160 MG/1; MG/1
1 TABLET ORAL ONCE
Status: DISCONTINUED | OUTPATIENT
Start: 2024-10-11 | End: 2024-10-11

## 2024-10-11 RX ORDER — CEFDINIR 300 MG/1
300 CAPSULE ORAL 2 TIMES DAILY
Qty: 14 CAPSULE | Refills: 0 | Status: ACTIVE | OUTPATIENT
Start: 2024-10-11 | End: 2024-10-18

## 2024-10-11 RX ORDER — NITROFURANTOIN 25; 75 MG/1; MG/1
100 CAPSULE ORAL 2 TIMES DAILY
Qty: 10 CAPSULE | Refills: 0 | Status: ACTIVE | OUTPATIENT
Start: 2024-10-11 | End: 2024-10-11

## 2024-10-11 RX ORDER — CEFTRIAXONE 1 G/1
1000 INJECTION, POWDER, FOR SOLUTION INTRAMUSCULAR; INTRAVENOUS ONCE
Status: COMPLETED | OUTPATIENT
Start: 2024-10-11 | End: 2024-10-11

## 2024-10-11 RX ADMIN — MORPHINE SULFATE 4 MG: 4 INJECTION, SOLUTION INTRAMUSCULAR; INTRAVENOUS at 00:21

## 2024-10-11 RX ADMIN — CEFTRIAXONE SODIUM 1000 MG: 1 INJECTION, POWDER, FOR SOLUTION INTRAMUSCULAR; INTRAVENOUS at 01:03

## 2024-10-11 RX ADMIN — KETOROLAC TROMETHAMINE 15 MG: 15 INJECTION, SOLUTION INTRAMUSCULAR; INTRAVENOUS at 01:03

## 2025-03-31 ENCOUNTER — OFFICE VISIT (OUTPATIENT)
Dept: URGENT CARE | Facility: PHYSICIAN GROUP | Age: 30
End: 2025-03-31
Payer: MEDICAID

## 2025-03-31 VITALS
TEMPERATURE: 97.2 F | DIASTOLIC BLOOD PRESSURE: 70 MMHG | HEART RATE: 96 BPM | RESPIRATION RATE: 20 BRPM | SYSTOLIC BLOOD PRESSURE: 110 MMHG | WEIGHT: 155 LBS | HEIGHT: 65 IN | OXYGEN SATURATION: 99 % | BODY MASS INDEX: 25.83 KG/M2

## 2025-03-31 DIAGNOSIS — J02.9 PHARYNGITIS, UNSPECIFIED ETIOLOGY: ICD-10-CM

## 2025-03-31 DIAGNOSIS — R18.8 CIRRHOSIS OF LIVER WITH ASCITES, UNSPECIFIED HEPATIC CIRRHOSIS TYPE (HCC): ICD-10-CM

## 2025-03-31 DIAGNOSIS — R68.89 FLU-LIKE SYMPTOMS: ICD-10-CM

## 2025-03-31 DIAGNOSIS — K74.60 CIRRHOSIS OF LIVER WITH ASCITES, UNSPECIFIED HEPATIC CIRRHOSIS TYPE (HCC): ICD-10-CM

## 2025-03-31 LAB
FLUAV RNA SPEC QL NAA+PROBE: NEGATIVE
FLUBV RNA SPEC QL NAA+PROBE: NEGATIVE
RSV RNA SPEC QL NAA+PROBE: NEGATIVE
S PYO DNA SPEC NAA+PROBE: NOT DETECTED
SARS-COV-2 RNA RESP QL NAA+PROBE: NEGATIVE

## 2025-03-31 PROCEDURE — 87637 SARSCOV2&INF A&B&RSV AMP PRB: CPT | Mod: QW | Performed by: NURSE PRACTITIONER

## 2025-03-31 PROCEDURE — 3078F DIAST BP <80 MM HG: CPT | Performed by: NURSE PRACTITIONER

## 2025-03-31 PROCEDURE — 3074F SYST BP LT 130 MM HG: CPT | Performed by: NURSE PRACTITIONER

## 2025-03-31 PROCEDURE — 87651 STREP A DNA AMP PROBE: CPT | Performed by: NURSE PRACTITIONER

## 2025-03-31 PROCEDURE — 99203 OFFICE O/P NEW LOW 30 MIN: CPT | Performed by: NURSE PRACTITIONER

## 2025-03-31 RX ORDER — IBUPROFEN 200 MG
600 TABLET ORAL ONCE
Status: COMPLETED | OUTPATIENT
Start: 2025-03-31 | End: 2025-03-31

## 2025-03-31 RX ORDER — ACETAMINOPHEN 500 MG
1000 TABLET ORAL ONCE
Status: DISCONTINUED | OUTPATIENT
Start: 2025-03-31 | End: 2025-03-31

## 2025-03-31 RX ADMIN — Medication 600 MG: at 19:08

## 2025-03-31 ASSESSMENT — FIBROSIS 4 INDEX: FIB4 SCORE: 0.71
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
## 2025-04-04 ENCOUNTER — HOSPITAL ENCOUNTER (EMERGENCY)
Facility: MEDICAL CENTER | Age: 30
End: 2025-04-04
Attending: EMERGENCY MEDICINE
Payer: MEDICAID

## 2025-04-04 VITALS
HEIGHT: 65 IN | SYSTOLIC BLOOD PRESSURE: 120 MMHG | BODY MASS INDEX: 23.32 KG/M2 | DIASTOLIC BLOOD PRESSURE: 68 MMHG | OXYGEN SATURATION: 97 % | RESPIRATION RATE: 17 BRPM | TEMPERATURE: 98 F | HEART RATE: 88 BPM | WEIGHT: 140 LBS

## 2025-04-04 DIAGNOSIS — F10.921 ALCOHOL INTOXICATION WITH DELIRIUM (HCC): ICD-10-CM

## 2025-04-04 DIAGNOSIS — T50.902A INTENTIONAL DRUG OVERDOSE, INITIAL ENCOUNTER (HCC): ICD-10-CM

## 2025-04-04 LAB
ALT SERPL-CCNC: 30 U/L (ref 2–50)
AMPHET UR QL SCN: NEGATIVE
ANION GAP SERPL CALC-SCNC: 13 MMOL/L (ref 7–16)
APAP SERPL-MCNC: <5 UG/ML (ref 10–30)
AST SERPL-CCNC: 44 U/L (ref 12–45)
BARBITURATES UR QL SCN: NEGATIVE
BASOPHILS # BLD AUTO: 0.9 % (ref 0–1.8)
BASOPHILS # BLD: 0.07 K/UL (ref 0–0.12)
BENZODIAZ UR QL SCN: NEGATIVE
BUN SERPL-MCNC: 5 MG/DL (ref 8–22)
BZE UR QL SCN: NEGATIVE
CALCIUM SERPL-MCNC: 8.5 MG/DL (ref 8.5–10.5)
CANNABINOIDS UR QL SCN: NEGATIVE
CHLORIDE SERPL-SCNC: 105 MMOL/L (ref 96–112)
CO2 SERPL-SCNC: 20 MMOL/L (ref 20–33)
CREAT SERPL-MCNC: 0.56 MG/DL (ref 0.5–1.4)
EKG IMPRESSION: NORMAL
EOSINOPHIL # BLD AUTO: 0.21 K/UL (ref 0–0.51)
EOSINOPHIL NFR BLD: 2.7 % (ref 0–6.9)
ERYTHROCYTE [DISTWIDTH] IN BLOOD BY AUTOMATED COUNT: 51.8 FL (ref 35.9–50)
ETHANOL BLD-MCNC: 205 MG/DL
FENTANYL UR QL: NEGATIVE
GFR SERPLBLD CREATININE-BSD FMLA CKD-EPI: 125 ML/MIN/1.73 M 2
GLUCOSE SERPL-MCNC: 99 MG/DL (ref 65–99)
HCG SERPL QL: NEGATIVE
HCT VFR BLD AUTO: 42.2 % (ref 37–47)
HGB BLD-MCNC: 13.9 G/DL (ref 12–16)
IMM GRANULOCYTES # BLD AUTO: 0.02 K/UL (ref 0–0.11)
IMM GRANULOCYTES NFR BLD AUTO: 0.3 % (ref 0–0.9)
LYMPHOCYTES # BLD AUTO: 3.5 K/UL (ref 1–4.8)
LYMPHOCYTES NFR BLD: 45.5 % (ref 22–41)
MCH RBC QN AUTO: 32 PG (ref 27–33)
MCHC RBC AUTO-ENTMCNC: 32.9 G/DL (ref 32.2–35.5)
MCV RBC AUTO: 97 FL (ref 81.4–97.8)
METHADONE UR QL SCN: NEGATIVE
MONOCYTES # BLD AUTO: 0.58 K/UL (ref 0–0.85)
MONOCYTES NFR BLD AUTO: 7.5 % (ref 0–13.4)
NEUTROPHILS # BLD AUTO: 3.32 K/UL (ref 1.82–7.42)
NEUTROPHILS NFR BLD: 43.1 % (ref 44–72)
NRBC # BLD AUTO: 0 K/UL
NRBC BLD-RTO: 0 /100 WBC (ref 0–0.2)
OPIATES UR QL SCN: NEGATIVE
OXYCODONE UR QL SCN: NEGATIVE
PCP UR QL SCN: NEGATIVE
PLATELET # BLD AUTO: 299 K/UL (ref 164–446)
PMV BLD AUTO: 10 FL (ref 9–12.9)
POC BREATHALIZER: 0.09 PERCENT (ref 0–0.01)
POC BREATHALIZER: 0.16 PERCENT (ref 0–0.01)
POTASSIUM SERPL-SCNC: 4 MMOL/L (ref 3.6–5.5)
PROPOXYPH UR QL SCN: NEGATIVE
RBC # BLD AUTO: 4.35 M/UL (ref 4.2–5.4)
SALICYLATES SERPL-MCNC: <1 MG/DL (ref 15–25)
SODIUM SERPL-SCNC: 138 MMOL/L (ref 135–145)
WBC # BLD AUTO: 7.7 K/UL (ref 4.8–10.8)

## 2025-04-04 PROCEDURE — 84450 TRANSFERASE (AST) (SGOT): CPT

## 2025-04-04 PROCEDURE — 94760 N-INVAS EAR/PLS OXIMETRY 1: CPT

## 2025-04-04 PROCEDURE — 82077 ASSAY SPEC XCP UR&BREATH IA: CPT

## 2025-04-04 PROCEDURE — 93005 ELECTROCARDIOGRAM TRACING: CPT | Mod: TC | Performed by: EMERGENCY MEDICINE

## 2025-04-04 PROCEDURE — 700102 HCHG RX REV CODE 250 W/ 637 OVERRIDE(OP): Mod: UD | Performed by: EMERGENCY MEDICINE

## 2025-04-04 PROCEDURE — 302970 POC BREATHALIZER: Performed by: EMERGENCY MEDICINE

## 2025-04-04 PROCEDURE — 80143 DRUG ASSAY ACETAMINOPHEN: CPT

## 2025-04-04 PROCEDURE — 84703 CHORIONIC GONADOTROPIN ASSAY: CPT

## 2025-04-04 PROCEDURE — 80307 DRUG TEST PRSMV CHEM ANLYZR: CPT

## 2025-04-04 PROCEDURE — 84460 ALANINE AMINO (ALT) (SGPT): CPT

## 2025-04-04 PROCEDURE — 80179 DRUG ASSAY SALICYLATE: CPT

## 2025-04-04 PROCEDURE — 80048 BASIC METABOLIC PNL TOTAL CA: CPT

## 2025-04-04 PROCEDURE — A9270 NON-COVERED ITEM OR SERVICE: HCPCS | Mod: UD | Performed by: EMERGENCY MEDICINE

## 2025-04-04 PROCEDURE — 36415 COLL VENOUS BLD VENIPUNCTURE: CPT

## 2025-04-04 PROCEDURE — 99285 EMERGENCY DEPT VISIT HI MDM: CPT

## 2025-04-04 PROCEDURE — 90791 PSYCH DIAGNOSTIC EVALUATION: CPT

## 2025-04-04 PROCEDURE — 85025 COMPLETE CBC W/AUTO DIFF WBC: CPT

## 2025-04-04 RX ORDER — GABAPENTIN 100 MG/1
100 CAPSULE ORAL 3 TIMES DAILY
COMMUNITY
Start: 2025-02-05

## 2025-04-04 RX ORDER — NICOTINE 21 MG/24HR
1 PATCH, TRANSDERMAL 24 HOURS TRANSDERMAL ONCE
Status: DISCONTINUED | OUTPATIENT
Start: 2025-04-04 | End: 2025-04-04 | Stop reason: HOSPADM

## 2025-04-04 RX ORDER — PROPRANOLOL HYDROCHLORIDE 10 MG/1
10 TABLET ORAL 2 TIMES DAILY
COMMUNITY
Start: 2025-03-03

## 2025-04-04 RX ORDER — CELECOXIB 100 MG/1
100 CAPSULE ORAL 2 TIMES DAILY
COMMUNITY
Start: 2025-01-06

## 2025-04-04 RX ADMIN — NICOTINE 21 MG: 21 PATCH TRANSDERMAL at 15:45

## 2025-04-04 RX ADMIN — NICOTINE 14 MG: 14 PATCH TRANSDERMAL at 05:50

## 2025-04-04 ASSESSMENT — FIBROSIS 4 INDEX: FIB4 SCORE: 0.71

## 2025-04-04 NOTE — ED NOTES
Pt medically clear and may move to green pod per ERP. Report to Nena TRAN  Belongings to [place] [id number]

## 2025-04-04 NOTE — CONSULTS
"[facility]   TRIAGE ASSESSMENT    Name: Linda Carpenter  MRN: [id number]  : 1995  Age: 29 y.o.  Date of assessment: 2025  PCP: Pcp Not In Computer  Persons in attendance: Patient  Patient Location: [facility]    CHIEF COMPLAINT/PRESENTING ISSUE (as stated by patient): 29 year old female BIB EMS last night d/t intentional ingestion of RX Propranolol 10 mg ((15 tabs) and Escitalopram 10 mg (5 tabs) earlier this AM with ETOH intoxication; legal hold; on admit, ETOH level 0.205; pt medically cleared by [facility] ERP; later today, pt alert, oriented x 4; calm; cooperative; pleasant; with organized thoughts and behaviors; no delusions, paranoia, hallucinations noted; insight, judgment adequate; currently denies SI, HI, or self-harm ideation; future-oriented; states h/o 1 previous suicide attempt 1 year ago, , with ingestion of multiple OTC Ibuprofen tabs, did not seek medical or MH treatment; states she was drinking ETOH last night at home; her fiance went to bed, she started ruminating and perseverating her negative situational thoughts, \"there has been a lot of illness in my house, I can't work right now, I have liver cirrhosis,\" and started feeling helpless and hopeless; she impulsively ingested her meds, then realized this was a mistake and called 911; she is remorseful for her action and discussing ways to positively work on her physical and mental health; current substance use includes ETOH occasionally 3 tall mixed liquor drinks with last use 25; she denies current outpt MH providers of h/o inpt MH/CD tx; current psych meds include Escitalopram 10 mg PO daily and Propranolol 10 mg PO TID, taking as prescribed by her PCP; she is unemployed, financially supported by her fiance of 5 years; she resides with her fiance, Dat, and her 2 sons, ages 9 and 4 year old (who are currently with pt's jean); pt actively participating in safe DC planning    Writer RN spoke " to pt 's Dat pena, [phone], on the phone at pt's bedside; Dat denies safety concerns with pt returning home and he can pick her up at the ED today; writer RN reviewed with pt and Dat to keep medications locked and secure, with verbal understanding noted  Chief Complaint   Patient presents with    Suicidal Ideation     Pt BIBA from home for having SI/SA, pt took around 15-20 pills of propanolol and 4-5 pills of Lexapro around 30 mints ago. Pt is now complaining of feeling nauseous.         CURRENT LIVING SITUATION/SOCIAL SUPPORT/FINANCIAL RESOURCES: she is unemployed, financially supported by her jean of 5 years; she resides with her firemedios, Dat, and her 2 sons, ages 9 and 4 year old     BEHAVIORAL HEALTH/SUBSTANCE USE TREATMENT HISTORY  Does patient/parent report a history of prior behavioral health/substance use treatment for patient?   No:    SAFETY ASSESSMENT - SELF  Does patient acknowledge current or past symptoms of dangerousness to self or is previous history noted? Yes-  last night d/t intentional ingestion of RX Propranolol 10 mg ((15 tabs) and Escitalopram 10 mg (5 tabs) earlier this AM with ETOH intoxication; states h/o 1 previous suicide attempt 1 year ago, 2024, with ingestion of multiple OTC Ibuprofen tabs, did not seek medical or  treatment  Does parent/significant other report patient has current or past symptoms of dangerousness to self? yes  Does presenting problem suggest symptoms of dangerousness to self? Yes:     Past Current    Suicidal Thoughts: [x]  []    Suicidal Plans: [x]  []    Suicidal Intent: []  []    Suicide Attempts: [x]  []    Self-Injury []  []      For any boxes checked above, provide detail: last night d/t intentional ingestion of RX Propranolol 10 mg ((15 tabs) and Escitalopram 10 mg (5 tabs) earlier this AM with ETOH intoxication; states h/o 1 previous suicide attempt 1 year ago, 2024, with ingestion of multiple OTC Ibuprofen tabs, did not seek medical  or  treatment    History of suicide by family member: no  History of suicide by friend/significant other: no  Recent change in frequency/specificity/intensity of suicidal thoughts or self-harm behavior? yes - last night with ETOH intoxication  Current access to firearms, medications, or other identified means of suicide/self-harm? RX medications  If yes, willing to restrict access to means of suicide/self-harm? yes - keep medications locked and secure  Protective factors present:  Fear of suicide, Future-oriented, Good impulse control, Hopefulness, Optimism, Positive coping skills, Positive self-efficacy, Strong family connections, and Willing to address in treatment    SAFETY ASSESSMENT - OTHERS  Does patient acknowledge current or past symptoms of aggressive behavior or risk to others or is previous history noted? no  Does parent/significant other report patient has current or past symptoms of aggressive behavior or risk to others?  N\A  Does presenting problem suggest symptoms of dangerousness to others? No    LEGAL HISTORY  Does patient acknowledge history of arrest/penitentiary/shelter or is previous history noted? no    Crisis Safety Plan completed and copy given to patient? yes    ABUSE/NEGLECT SCREENING  Does patient report feeling “unsafe” in his/her home, or afraid of anyone?  no  Does patient report any history of physical, sexual, or emotional abuse?  no  Does parent or significant other report any of the above? N\A  Is there evidence of neglect by self?  no  Is there evidence of neglect by a caregiver? no  Does the patient/parent report any history of CPS/APS/police involvement related to suspected abuse/neglect or domestic violence? no  Based on the information provided during the current assessment, is a mandated report of suspected abuse/neglect being made?  No    SUBSTANCE USE SCREENING  Yes:  Jeferson all substances used in the past 30 days:      Last Use Amount   [x]   Alcohol 4/4/25 3 tall mixed liquor  "drinks occasionally   []   Marijuana     []   Heroin     []   Prescription Opioids  (used without prescription, for    recreation, or in excess of prescribed amount)     []   Other Prescription  (used without prescription, for    recreation, or in excess of prescribed amount)     []   Cocaine      []   Methamphetamine     []   \"\" drugs (ectasy, MDMA)     []   Other substances        UDS results: negative  Breathalyzer results: 0.205    What consequences does the patient associate with any of the above substance use and or addictive behaviors? None    Risk factors for detox (check all that apply):  []  Seizures   []  Diaphoretic (sweating)   []  Tremors   []  Hallucinations   []  Increased blood pressure   []  Decreased blood pressure   []  Other   [x]  None      [] Patient education on risk factors for detoxification and instructed to return to ER as needed.      MENTAL STATUS   Participation: Active verbal participation, Attentive, Engaged, and Open to feedback  Grooming: Casual and Neat  Orientation: Alert and Fully Oriented  Behavior: Calm  Eye contact: Good  Mood: Euthymic  Affect: Flexible and Full range  Thought process: Logical, Goal-directed, and Circumstantial  Thought content: Within normal limits  Speech: Rate within normal limits and Volume within normal limits  Perception: Within normal limits  Memory:  No gross evidence of memory deficits  Insight: Adequate  Judgment:  Adequate  Other:    Collateral information:   Source:  [] Significant other present in person:   [] Significant other by telephone  [] [facility]   [] [facility] Nursing Staff  [] [facility]  [x] Other: pt 's jeanMaliniDat, [phone]    [] Unable to complete full assessment due to:  [] Acute intoxication  [] Patient declined to participate/engage  [] Patient verbally unresponsive  [] Significant cognitive deficits  [] Significant perceptual distortions or behavioral disorganization  [] Other:      CLINICAL " IMPRESSIONS:  Primary:  r/o depressed mood secondary to ETOH intoxication  Secondary:  r/o adjustment d/o with depressed mood secondary to current medical issues       IDENTIFIED NEEDS/PLAN:  [Trigger DISPOSITION list for any items marked]    []  Imminent safety risk - self [] Imminent safety risk - others   []  Acute substance withdrawal []  Psychosis/Impaired reality testing   [x]  Mood/anxiety []  Substance use/Addictive behavior   [x]  Maladaptive behaviro []  Parent/child conflict   []  Family/Couples conflict [x]  Biomedical   []  Housing []  Financial   []   Legal  Occupational/Educational   []  Domestic violence []  Other:     Recommended Plan of Care:  Refer to  [facility], [facility], [place] [facility], [facility], [facility] ,  [place] [facility], [facility] [facility], and [facility] transportation included in pt's insurance plan, Medicaid FFS; writer RN reviewed community  resources with  pt, with written information given, and verbal understanding noted; pt to DC ambulatory to self today to home with transport by her fiance    Has the Recommended Plan of Care/Level of Observation been reviewed with the patient's assigned nurse? yes    Does patient/parent or guardian express agreement with the above plan? yes    Referral appointment(s) scheduled? no    Alert team only:   I have discussed findings and recommendations with Dr. Barriga who is in agreement with these recommendations. Legal hold DC'd    Referral information sent to the following outpatient community providers :none    Referral information sent to the following inpatient community providers : none    If applicable : Referred  to  Alert Team for legal hold follow up at (time): MILDRED Robles R.N.  4/4/2025

## 2025-04-04 NOTE — ED NOTES
This RN called poison control and speak to Tasia, [place] case [place] [id number]    Pt needs to be observe for Lexapro effects like:   1) Agitation  2) Confusion   3) Hyper tension  4) QTC prolongation, QRS widening   5) Seizure    Pt might have Serotonin syndrome, QRS widening and seizure from Propranolol. Pt needs to be on continuous monitoring. If QRS> 120 msec, administered 1-2 ampule of sodium bicarb. Repeat EKG after 15 mints of medication.  If symptomatic bradycardia, treat with atropine, hypotension treat with Iv fluids and vasopressin.     If pt have seizures, agitation and serotonin syndrome treat with Benzo. Observe pt till back to baseline. ERP made aware of the recommendation made by poison control center RN (Tasia).

## 2025-04-04 NOTE — ED TRIAGE NOTES
"Chief Complaint   Patient presents with    Suicidal Ideation     Pt BIBA from home for having SI/SA, pt took around 15-20 pills of propanolol and 4-5 pills of Lexapro around 30 mints ago. Pt is now complaining of feeling nauseous.      Pt stated she was feeling depressed for the last 4 years now. Pt stated feel no energy and feels sick. Pt has plan for OD on medication. Pt denies any HI today. Denies any N/V or chest pain. Pt endorse ETOH around 10 pm yesterday.     Pt connected to the monitor, provided paper scrubs. All medical equipments removed from the room.     /80   Pulse 67   Temp 36.9 °C (98.4 °F) (Temporal)   Resp 17   Ht 1.651 m (5' 5\")   Wt 63.5 kg (140 lb)   SpO2 95%   BMI 23.30 kg/m²     "

## 2025-04-04 NOTE — ED NOTES
Pt was trying to leave the room stated she wants to go out for vaping, this RN explain the reason why can't let her go out but offer nicotine patch. Pt agreed, ERP made aware of.

## 2025-04-04 NOTE — ED NOTES
Assumed care of pt. Pt moved from [address] to [address]. 1:1 safety sitter at bedside. Pt updated on POC.

## 2025-04-04 NOTE — ED NOTES
Assumed pt care. Pt sleeping on right side. Sitter at duque for 1:1 observation. Security doing belongings check.

## 2025-04-04 NOTE — ED NOTES
Pharmacy Medication Reconciliation      ~Medication reconciliation updated and complete per patient at bedside & patient home pharmacy   ~Allergies have been verified and updated   ~No oral ABX within the last 30 days  ~Is dispense history available in EPIC: yes   ~Patient home pharmacy :  [facility]      ~Anticoagulants (rivaroxaban, apixaban, edoxaban, dabigatran, warfarin, enoxaparin) taken in the last 14 days? No

## 2025-04-04 NOTE — ED PROVIDER NOTES
ED Provider Note    CHIEF COMPLAINT  Chief Complaint   Patient presents with    Suicidal Ideation     Pt BIBA from home for having SI/SA, pt took around 15-20 pills of propanolol and 4-5 pills of Lexapro around 30 mints ago. Pt is now complaining of feeling nauseous.        EXTERNAL RECORDS REVIEWED  Outpatient Notes urgent care note 3/31/2025 for flulike symptoms    HPI/ROS  LIMITATION TO HISTORY   Select: : None  OUTSIDE HISTORIAN(S):  None    Linda Carpenter is a 29 y.o. female who presents to the emergency department for acute on chronic depression with suicidal attempt.  Past history of prior overdose attempts roughly 1 year ago.  Was not evaluated in the hospital setting at that time.  States that she has a chronic history of alcohol abuse typically with binge drinking cycles.  Does not drink every day.  Was drinking tonight when she became more depressed and ultimately she took roughly 15 to 20 pills of her propranolol in 4-5 her Lexapro.  Ingestion was around 2:30 AM roughly 2 hours ago.  Denies any other coingestions besides that stated above.  Continues to feel depressed but otherwise physically feels okay.    PAST MEDICAL HISTORY   has a past medical history of Anemia (9/4/2024), Coagulopathy (HCC) (9/4/2024), and Gastroesophageal reflux disease (9/4/2024).    SURGICAL HISTORY   has a past surgical history that includes upper gi endoscopy,diagnosis (N/A, 9/4/2024) and colonoscopy-flexible (N/A, 9/4/2024).    FAMILY HISTORY  Family History   Problem Relation Age of Onset    Other Mother         fribromyalgia     Other Father         kidney failure    Asthma Brother        SOCIAL HISTORY  Social History     Tobacco Use    Smoking status: Former     Current packs/day: 0.25     Average packs/day: 0.3 packs/day for 7.0 years (1.8 ttl pk-yrs)     Types: Cigarettes    Smokeless tobacco: Never    Tobacco comments:     Trying to quit smoking. 3 cigarettes/day now   Vaping Use    Vaping status: Every Day     "Substances: Nicotine   Substance and Sexual Activity    Alcohol use: No    Drug use: No    Sexual activity: Never     Partners: Male     Comment: Unplanne pregnancy       CURRENT MEDICATIONS  Home Medications       Reviewed by Belkis Zapata (Pharmacy Tech) on 04/04/25 at 0944  Med List Status: Complete     Medication Last Dose Status   celecoxib (CELEBREX) 100 MG Cap Unknown Active   escitalopram (LEXAPRO) 10 MG Tab Unknown Active   gabapentin (NEURONTIN) 100 MG Cap Unknown Active   pantoprazole (PROTONIX) 40 MG Tablet Delayed Response Unknown Active   propranolol (INDERAL) 10 MG Tab Unknown Active                    ALLERGIES  Allergies   Allergen Reactions    Cefaclor Unspecified     RXN= as baby (increased symptoms)    Sulfamethoxazole W-Trimethoprim Rash     Rash         PHYSICAL EXAM  VITAL SIGNS: /57   Pulse 66   Temp 36.9 °C (98.4 °F) (Temporal)   Resp 16   Ht 1.651 m (5' 5\")   Wt 63.5 kg (140 lb)   SpO2 94%   BMI 23.30 kg/m²      Pulse ox interpretation: I interpret this pulse ox as normal.  Constitutional: Alert in no apparent distress.  HENT: No signs of trauma, Bilateral external ears normal, Nose normal.   Eyes: Pupils are equal and reactive  Neck: Normal range of motion, No tenderness, Supple  Cardiovascular: Regular rate and rhythm, no murmurs.   Thorax & Lungs: Normal breath sounds, No respiratory distress  Abdomen: Bowel sounds normal, Soft, No tenderness  Skin: Warm, Dry, No erythema, No rash.   Musculoskeletal: Good range of motion in all major joints. No tenderness to palpation or major deformities noted.   Neurologic: Alert , Normal motor function, Normal sensory function, No focal deficits noted.   Psychiatric: Tearful with depressed affect, suicidal.        EKG/LABS  Results for orders placed or performed during the hospital encounter of 04/04/25   CBC WITH DIFFERENTIAL    Collection Time: 04/04/25  4:25 AM   Result Value Ref Range    WBC 7.7 4.8 - 10.8 K/uL    RBC 4.35 4.20 - " 5.40 M/uL    Hemoglobin 13.9 12.0 - 16.0 g/dL    Hematocrit 42.2 37.0 - 47.0 %    MCV 97.0 81.4 - 97.8 fL    MCH 32.0 27.0 - 33.0 pg    MCHC 32.9 32.2 - 35.5 g/dL    RDW 51.8 (H) 35.9 - 50.0 fL    Platelet Count 299 164 - 446 K/uL    MPV 10.0 9.0 - 12.9 fL    Neutrophils-Polys 43.10 (L) 44.00 - 72.00 %    Lymphocytes 45.50 (H) 22.00 - 41.00 %    Monocytes 7.50 0.00 - 13.40 %    Eosinophils 2.70 0.00 - 6.90 %    Basophils 0.90 0.00 - 1.80 %    Immature Granulocytes 0.30 0.00 - 0.90 %    Nucleated RBC 0.00 0.00 - 0.20 /100 WBC    Neutrophils (Absolute) 3.32 1.82 - 7.42 K/uL    Lymphs (Absolute) 3.50 1.00 - 4.80 K/uL    Monos (Absolute) 0.58 0.00 - 0.85 K/uL    Eos (Absolute) 0.21 0.00 - 0.51 K/uL    Baso (Absolute) 0.07 0.00 - 0.12 K/uL    Immature Granulocytes (abs) 0.02 0.00 - 0.11 K/uL    NRBC (Absolute) 0.00 K/uL   BASIC METABOLIC PANEL    Collection Time: 04/04/25  4:25 AM   Result Value Ref Range    Sodium 138 135 - 145 mmol/L    Potassium 4.0 3.6 - 5.5 mmol/L    Chloride 105 96 - 112 mmol/L    Co2 20 20 - 33 mmol/L    Glucose 99 65 - 99 mg/dL    Bun 5 (L) 8 - 22 mg/dL    Creatinine 0.56 0.50 - 1.40 mg/dL    Calcium 8.5 8.5 - 10.5 mg/dL    Anion Gap 13.0 7.0 - 16.0   HCG Qual Serum    Collection Time: 04/04/25  4:25 AM   Result Value Ref Range    Beta-Hcg Qualitative Serum Negative Negative   DIAGNOSTIC ALCOHOL    Collection Time: 04/04/25  4:25 AM   Result Value Ref Range    Diagnostic Alcohol 205.0 (H) <10.1 mg/dL   ESTIMATED GFR    Collection Time: 04/04/25  4:25 AM   Result Value Ref Range    GFR (CKD-EPI) 125 >60 mL/min/1.73 m 2   ASPARTATE AMINO-[facility]    Collection Time: 04/04/25  4:25 AM   Result Value Ref Range    AST(SGOT) 44 12 - 45 U/L   ALANINE AMINO-TRANS    Collection Time: 04/04/25  4:25 AM   Result Value Ref Range    ALT(SGPT) 30 2 - 50 U/L   POC BREATHALIZER    Collection Time: 04/04/25  4:38 AM   Result Value Ref Range    POC Breathalizer 0.157 (A) 0.00 - 0.01 Percent   EKG    Collection Time:  25  5:15 AM   Result Value Ref Range    Report       [facility] Emergency Dept.    Test Date:  2025  Pt Name:    SHASTA RUIZ       Department: ER  MRN:        [id number]                      Room:        21  Gender:     Female                       Technician: [id number]  :        1995                   Requested By:INDERJIT BARNES  Order #:    [id number]                    Reading MD: Inderjit Barnes    Measurements  Intervals                                Axis  Rate:       61                           P:          33  NH:         138                          QRS:        35  QRSD:       92                           T:          47  QT:         442  QTc:        446    Interpretive Statements  Sinus rhythm  No previous ECG available for comparison  Electronically Signed On 2025 05:15:06 PDT by Inderjit Barnes     ACETAMINOPHEN    Collection Time: 25  5:36 AM   Result Value Ref Range    Acetaminophen -Tylenol <5.0 (L) 10.0 - 30.0 ug/mL   Salicylate    Collection Time: 25  5:36 AM   Result Value Ref Range    Salicylates, Quant. <1.0 (L) 15.0 - 25.0 mg/dL   POC BREATHALIZER    Collection Time: 25  9:17 AM   Result Value Ref Range    POC Breathalizer 0.091 (A) 0.00 - 0.01 Percent       I have independently interpreted this EKG          COURSE & MEDICAL DECISION MAKING    ASSESSMENT, COURSE AND PLAN  Care Narrative:     ED OBS: Yes; I am placing the patient in to an observation status due to a diagnostic uncertainty as well as therapeutic intensity. Patient placed in observation status at 4:18 AM, 2025.     Observation plan is as follows: Patient presenting with suicidal attempts.  Will contact poison control, medically clear and then have alert team evaluate the patient.  Patient will likely need continued acute inpatient psychiatric care     Patient medically cleared.  Pending alert team evaluation        DISPOSITION AND DISCUSSIONS  I have discussed  management of the patient with the following physicians and ZINA's: None    29-year-old presenting with acute on chronic depression with continued alcohol abuse and tonight additional intentional pill ingestion for attempts at suicide.  Please see additional observational course and plan as above.  Patient signed out to my colleague at this time for continuation of care.        FINAL DIAGNOSIS  Suicidal attempt by overdose  Alcohol intoxication  Electronically signed by: Inderjit Breaux M.D., 4/4/2025 4:14 AM

## 2025-04-04 NOTE — DISCHARGE SUMMARY
"  ED Observation Discharge Summary    Patient:Linda Carpenter  Patient : 1995  Patient MRN: [id number]  Patient PCP: Pcp Not In Computer    Admit Date: 2025  Discharge Date and Time: 25 2:15 PM  Discharge Diagnosis:   1. Alcohol intoxication with delirium (HCC)    2. Intentional drug overdose, initial encounter ([facility])        Discharge Attending: Lupillo Barriga M.D.  Discharge Service: ED Observation    ED Course  Linda Olivo is a 29 y.o. female who was evaluated at [facility] emergency department with alcohol intoxication suicidal ideation and intentional drug overdose.  Patient is now sober.  She is regretful of these actions.  She was intoxicated during these behaviors.  She has been evaluated by the behavioral health team.  The patient does not have any suicidal ideation.  She is a mother of 2 children.  She is forward thinking.  Future oriented.  Will follow-up as an outpatient.  Has good resources and family support.    Discharge Exam:  /57   Pulse 66   Temp 36.9 °C (98.4 °F) (Temporal)   Resp 16   Ht 1.651 m (5' 5\")   Wt 63.5 kg (140 lb)   SpO2 94%   BMI 23.30 kg/m² .    Constitutional: Awake and alert. Nontoxic  HENT:  Grossly normal  Eyes: Grossly normal  Neck: Normal range of motion  Cardiovascular: Normal heart rate   Thorax & Lungs: No respiratory distress  Abdomen: Nontender  Skin:  No pathologic rash.   Extremities: Well perfused  Psychiatric: Affect normal    Labs  Results for orders placed or performed during the hospital encounter of 25   CBC WITH DIFFERENTIAL    Collection Time: 25  4:25 AM   Result Value Ref Range    WBC 7.7 4.8 - 10.8 K/uL    RBC 4.35 4.20 - 5.40 M/uL    Hemoglobin 13.9 12.0 - 16.0 g/dL    Hematocrit 42.2 37.0 - 47.0 %    MCV 97.0 81.4 - 97.8 fL    MCH 32.0 27.0 - 33.0 pg    MCHC 32.9 32.2 - 35.5 g/dL    RDW 51.8 (H) 35.9 - 50.0 fL    Platelet Count 299 164 - 446 K/uL    MPV 10.0 9.0 - 12.9 fL    Neutrophils-Polys 43.10 (L) " 44.00 - 72.00 %    Lymphocytes 45.50 (H) 22.00 - 41.00 %    Monocytes 7.50 0.00 - 13.40 %    Eosinophils 2.70 0.00 - 6.90 %    Basophils 0.90 0.00 - 1.80 %    Immature Granulocytes 0.30 0.00 - 0.90 %    Nucleated RBC 0.00 0.00 - 0.20 /100 WBC    Neutrophils (Absolute) 3.32 1.82 - 7.42 K/uL    Lymphs (Absolute) 3.50 1.00 - 4.80 K/uL    Monos (Absolute) 0.58 0.00 - 0.85 K/uL    Eos (Absolute) 0.21 0.00 - 0.51 K/uL    Baso (Absolute) 0.07 0.00 - 0.12 K/uL    Immature Granulocytes (abs) 0.02 0.00 - 0.11 K/uL    NRBC (Absolute) 0.00 K/uL   BASIC METABOLIC PANEL    Collection Time: 04/04/25  4:25 AM   Result Value Ref Range    Sodium 138 135 - 145 mmol/L    Potassium 4.0 3.6 - 5.5 mmol/L    Chloride 105 96 - 112 mmol/L    Co2 20 20 - 33 mmol/L    Glucose 99 65 - 99 mg/dL    Bun 5 (L) 8 - 22 mg/dL    Creatinine 0.56 0.50 - 1.40 mg/dL    Calcium 8.5 8.5 - 10.5 mg/dL    Anion Gap 13.0 7.0 - 16.0   HCG Qual Serum    Collection Time: 04/04/25  4:25 AM   Result Value Ref Range    Beta-Hcg Qualitative Serum Negative Negative   DIAGNOSTIC ALCOHOL    Collection Time: 04/04/25  4:25 AM   Result Value Ref Range    Diagnostic Alcohol 205.0 (H) <10.1 mg/dL   ESTIMATED GFR    Collection Time: 04/04/25  4:25 AM   Result Value Ref Range    GFR (CKD-EPI) 125 >60 mL/min/1.73 m 2   ASPARTATE AMINO-[facility]    Collection Time: 04/04/25  4:25 AM   Result Value Ref Range    AST(SGOT) 44 12 - 45 U/L   ALANINE AMINO-TRANS    Collection Time: 04/04/25  4:25 AM   Result Value Ref Range    ALT(SGPT) 30 2 - 50 U/L   POC BREATHALIZER    Collection Time: 04/04/25  4:38 AM   Result Value Ref Range    POC Breathalizer 0.157 (A) 0.00 - 0.01 Percent   EKG    Collection Time: 04/04/25  5:15 AM   Result Value Ref Range    Report       [facility] Emergency Dept.    Test Date:  2025-04-04  Pt Name:    SHASTA RUIZ       Department: ER  MRN:        [id number]                      Room:        21  Gender:     Female                        Technician: [id number]  :        1995                   Requested By:INDERJIT BARNES  Order #:    [id number]                    Reading MD: Inderjit Barnes    Measurements  Intervals                                Axis  Rate:       61                           P:          33  W[place]:         138                          QRS:        35  QRSD:       92                           T:          47  QT:         442  QTc:        446    Interpretive Statements  Sinus rhythm  No previous ECG available for comparison  Electronically Signed On 2025 05:15:06 PDT by Inderjit Barnes     ACETAMINOPHEN    Collection Time: 25  5:36 AM   Result Value Ref Range    Acetaminophen -Tylenol <5.0 (L) 10.0 - 30.0 ug/mL   Salicylate    Collection Time: 25  5:36 AM   Result Value Ref Range    Salicylates, Quant. <1.0 (L) 15.0 - 25.0 mg/dL   POC BREATHALIZER    Collection Time: 25  9:17 AM   Result Value Ref Range    POC Breathalizer 0.091 (A) 0.00 - 0.01 Percent   Urine Drug Screen    Collection Time: 25 12:12 PM   Result Value Ref Range    Amphetamines Urine Negative Negative    Barbiturates Negative Negative    Benzodiazepines Negative Negative    Cocaine Metabolite Negative Negative    Fentanyl, Urine Negative Negative    Methadone Negative Negative    Opiates Negative Negative    Oxycodone Negative Negative    Phencyclidine -Pcp Negative Negative    Propoxyphene Negative Negative    Cannabinoid Metab Negative Negative       Radiology  No orders to display       Medications:   New Prescriptions    No medications on file       My final assessment includes   1. Alcohol intoxication with delirium (HCC)    2. Intentional drug overdose, initial encounter (HCC)        Upon Reevaluation, the patient's condition has: Improved; and will be discharged.    Patient discharged from ED Observation status at 2:17 PM  (Time) 2025  (Date).     Total time spent on this ED Observation discharge encounter is < 30  Minutes    Electronically signed by: Lupillo Barriga M.D., 4/4/2025 2:15 PM

## 2025-04-04 NOTE — ED NOTES
Checked on bed, with unlabored respirations. No safety risk noted  Sleeping  Sitter - 1:1 with continuous visual monitoring by Trained Personnel  Continued safety precaution  Piporkhadra in low position, side rail up for pt safety.   No needs identified at the moment     Surgical wound to L calf intermittent re-opening  Depth into subcutaneous  Roll edges  Min exudate  Double layer vaseline gauze to ulcer; cover w ABD and secure w roll gauze - daily    Notable outflow diminish w intact ax-fem, fem-fem  Close f/u for slow healing in setting of tibial disease,  Chronic HF w diminished EF/perfusion capacity - optimize

## 2025-04-04 NOTE — ED NOTES
Pt verbalized understanding of dc instructions. Pt states she has all resources from Alert team. Pt also verbalized she knows how to call crisis hotline or 911 for SI underlined the crisis hotline number for pt. Pt has ride home from her . All belongings were returned to pt. Pt verbalized she has all belongings in place. Pt ambulatory to lobby w/o incident

## 2025-06-23 ENCOUNTER — APPOINTMENT (OUTPATIENT)
Dept: RADIOLOGY | Facility: MEDICAL CENTER | Age: 30
End: 2025-06-23
Attending: EMERGENCY MEDICINE
Payer: MEDICAID

## 2025-06-23 ENCOUNTER — HOSPITAL ENCOUNTER (INPATIENT)
Facility: MEDICAL CENTER | Age: 30
LOS: 4 days | End: 2025-06-27
Attending: EMERGENCY MEDICINE | Admitting: INTERNAL MEDICINE
Payer: MEDICAID

## 2025-06-23 DIAGNOSIS — Z34.03 ENCOUNTER FOR SUPERVISION OF NORMAL FIRST PREGNANCY IN THIRD TRIMESTER: ICD-10-CM

## 2025-06-23 DIAGNOSIS — K21.9 GASTROESOPHAGEAL REFLUX DISEASE, UNSPECIFIED WHETHER ESOPHAGITIS PRESENT: ICD-10-CM

## 2025-06-23 DIAGNOSIS — K74.60 CIRRHOSIS OF LIVER WITH ASCITES, UNSPECIFIED HEPATIC CIRRHOSIS TYPE (HCC): ICD-10-CM

## 2025-06-23 DIAGNOSIS — E55.9 VITAMIN D INSUFFICIENCY: ICD-10-CM

## 2025-06-23 DIAGNOSIS — S82.302A CLOSED FRACTURE OF DISTAL END OF LEFT TIBIA, UNSPECIFIED FRACTURE MORPHOLOGY, INITIAL ENCOUNTER: ICD-10-CM

## 2025-06-23 DIAGNOSIS — F10.930 ALCOHOL WITHDRAWAL SYNDROME WITHOUT COMPLICATION (HCC): ICD-10-CM

## 2025-06-23 DIAGNOSIS — S82.252A CLOSED DISPLACED COMMINUTED FRACTURE OF SHAFT OF LEFT TIBIA, INITIAL ENCOUNTER: Primary | ICD-10-CM

## 2025-06-23 DIAGNOSIS — R18.8 CIRRHOSIS OF LIVER WITH ASCITES, UNSPECIFIED HEPATIC CIRRHOSIS TYPE (HCC): ICD-10-CM

## 2025-06-23 DIAGNOSIS — D53.9 MACROCYTIC ANEMIA: ICD-10-CM

## 2025-06-23 PROBLEM — S82.202A FRACTURE OF LEFT TIBIA: Status: ACTIVE | Noted: 2025-06-23

## 2025-06-23 LAB
ALBUMIN SERPL BCP-MCNC: 4.5 G/DL (ref 3.2–4.9)
ALBUMIN/GLOB SERPL: 1.3 G/DL
ALP SERPL-CCNC: 129 U/L (ref 30–99)
ALT SERPL-CCNC: 34 U/L (ref 2–50)
ANION GAP SERPL CALC-SCNC: 18 MMOL/L (ref 7–16)
ANISOCYTOSIS BLD QL SMEAR: ABNORMAL
APTT PPP: 25.5 SEC (ref 24.7–36)
AST SERPL-CCNC: 47 U/L (ref 12–45)
BASOPHILS # BLD AUTO: 1.1 % (ref 0–1.8)
BASOPHILS # BLD: 0.15 K/UL (ref 0–0.12)
BILIRUB SERPL-MCNC: 1.2 MG/DL (ref 0.1–1.5)
BUN SERPL-MCNC: 13 MG/DL (ref 8–22)
CALCIUM ALBUM COR SERPL-MCNC: 8.7 MG/DL (ref 8.5–10.5)
CALCIUM SERPL-MCNC: 9.1 MG/DL (ref 8.5–10.5)
CHLORIDE SERPL-SCNC: 99 MMOL/L (ref 96–112)
CO2 SERPL-SCNC: 20 MMOL/L (ref 20–33)
COMMENT 1642: NORMAL
CREAT SERPL-MCNC: 0.85 MG/DL (ref 0.5–1.4)
EOSINOPHIL # BLD AUTO: 0.01 K/UL (ref 0–0.51)
EOSINOPHIL NFR BLD: 0.1 % (ref 0–6.9)
ERYTHROCYTE [DISTWIDTH] IN BLOOD BY AUTOMATED COUNT: 78.3 FL (ref 35.9–50)
GFR SERPLBLD CREATININE-BSD FMLA CKD-EPI: 93 ML/MIN/1.73 M 2
GLOBULIN SER CALC-MCNC: 3.5 G/DL (ref 1.9–3.5)
GLUCOSE SERPL-MCNC: 102 MG/DL (ref 65–99)
HCT VFR BLD AUTO: 38.2 % (ref 37–47)
HGB BLD-MCNC: 12.8 G/DL (ref 12–16)
IMM GRANULOCYTES # BLD AUTO: 0.03 K/UL (ref 0–0.11)
IMM GRANULOCYTES NFR BLD AUTO: 0.2 % (ref 0–0.9)
INR PPP: 1.13 (ref 0.87–1.13)
LYMPHOCYTES # BLD AUTO: 2.57 K/UL (ref 1–4.8)
LYMPHOCYTES NFR BLD: 19.1 % (ref 22–41)
MACROCYTES BLD QL SMEAR: ABNORMAL
MCH RBC QN AUTO: 36.5 PG (ref 27–33)
MCHC RBC AUTO-ENTMCNC: 33.5 G/DL (ref 32.2–35.5)
MCV RBC AUTO: 108.8 FL (ref 81.4–97.8)
MONOCYTES # BLD AUTO: 0.84 K/UL (ref 0–0.85)
MONOCYTES NFR BLD AUTO: 6.2 % (ref 0–13.4)
MORPHOLOGY BLD-IMP: NORMAL
NEUTROPHILS # BLD AUTO: 9.88 K/UL (ref 1.82–7.42)
NEUTROPHILS NFR BLD: 73.3 % (ref 44–72)
NRBC # BLD AUTO: 0 K/UL
NRBC BLD-RTO: 0 /100 WBC (ref 0–0.2)
PLATELET # BLD AUTO: 485 K/UL (ref 164–446)
PLATELET BLD QL SMEAR: NORMAL
PMV BLD AUTO: 10.3 FL (ref 9–12.9)
POIKILOCYTOSIS BLD QL SMEAR: NORMAL
POTASSIUM SERPL-SCNC: 3.9 MMOL/L (ref 3.6–5.5)
PROT SERPL-MCNC: 8 G/DL (ref 6–8.2)
PROTHROMBIN TIME: 14.6 SEC (ref 12–14.6)
RBC # BLD AUTO: 3.51 M/UL (ref 4.2–5.4)
RBC BLD AUTO: PRESENT
SODIUM SERPL-SCNC: 137 MMOL/L (ref 135–145)
STOMATOCYTES BLD QL SMEAR: NORMAL
WBC # BLD AUTO: 13.5 K/UL (ref 4.8–10.8)
WBC TOXIC VACUOLES BLD QL SMEAR: NORMAL

## 2025-06-23 PROCEDURE — 73590 X-RAY EXAM OF LOWER LEG: CPT | Mod: LT

## 2025-06-23 PROCEDURE — 85730 THROMBOPLASTIN TIME PARTIAL: CPT

## 2025-06-23 PROCEDURE — 96376 TX/PRO/DX INJ SAME DRUG ADON: CPT

## 2025-06-23 PROCEDURE — 80053 COMPREHEN METABOLIC PANEL: CPT

## 2025-06-23 PROCEDURE — 84703 CHORIONIC GONADOTROPIN ASSAY: CPT

## 2025-06-23 PROCEDURE — 700105 HCHG RX REV CODE 258: Performed by: INTERNAL MEDICINE

## 2025-06-23 PROCEDURE — 700111 HCHG RX REV CODE 636 W/ 250 OVERRIDE (IP): Performed by: INTERNAL MEDICINE

## 2025-06-23 PROCEDURE — 700102 HCHG RX REV CODE 250 W/ 637 OVERRIDE(OP): Performed by: INTERNAL MEDICINE

## 2025-06-23 PROCEDURE — 96374 THER/PROPH/DIAG INJ IV PUSH: CPT

## 2025-06-23 PROCEDURE — 99222 1ST HOSP IP/OBS MODERATE 55: CPT | Performed by: INTERNAL MEDICINE

## 2025-06-23 PROCEDURE — 85025 COMPLETE CBC W/AUTO DIFF WBC: CPT

## 2025-06-23 PROCEDURE — 770001 HCHG ROOM/CARE - MED/SURG/GYN PRIV*

## 2025-06-23 PROCEDURE — A9270 NON-COVERED ITEM OR SERVICE: HCPCS | Performed by: INTERNAL MEDICINE

## 2025-06-23 PROCEDURE — 96375 TX/PRO/DX INJ NEW DRUG ADDON: CPT

## 2025-06-23 PROCEDURE — 36415 COLL VENOUS BLD VENIPUNCTURE: CPT

## 2025-06-23 PROCEDURE — 73610 X-RAY EXAM OF ANKLE: CPT | Mod: LT

## 2025-06-23 PROCEDURE — 85610 PROTHROMBIN TIME: CPT

## 2025-06-23 PROCEDURE — 73700 CT LOWER EXTREMITY W/O DYE: CPT | Mod: LT

## 2025-06-23 PROCEDURE — 700111 HCHG RX REV CODE 636 W/ 250 OVERRIDE (IP): Mod: JZ,UD | Performed by: EMERGENCY MEDICINE

## 2025-06-23 PROCEDURE — 94760 N-INVAS EAR/PLS OXIMETRY 1: CPT

## 2025-06-23 PROCEDURE — 99285 EMERGENCY DEPT VISIT HI MDM: CPT

## 2025-06-23 RX ORDER — ACETAMINOPHEN 325 MG/1
650 TABLET ORAL EVERY 6 HOURS PRN
Status: DISCONTINUED | OUTPATIENT
Start: 2025-06-23 | End: 2025-06-24

## 2025-06-23 RX ORDER — MORPHINE SULFATE 4 MG/ML
4 INJECTION INTRAVENOUS ONCE
Status: DISCONTINUED | OUTPATIENT
Start: 2025-06-23 | End: 2025-06-23

## 2025-06-23 RX ORDER — LORAZEPAM 2 MG/1
2 TABLET ORAL
Status: DISCONTINUED | OUTPATIENT
Start: 2025-06-23 | End: 2025-06-27 | Stop reason: HOSPADM

## 2025-06-23 RX ORDER — PROCHLORPERAZINE EDISYLATE 5 MG/ML
5-10 INJECTION INTRAMUSCULAR; INTRAVENOUS EVERY 4 HOURS PRN
Status: DISCONTINUED | OUTPATIENT
Start: 2025-06-23 | End: 2025-06-27 | Stop reason: HOSPADM

## 2025-06-23 RX ORDER — MORPHINE SULFATE 4 MG/ML
4 INJECTION INTRAVENOUS ONCE
Status: COMPLETED | OUTPATIENT
Start: 2025-06-23 | End: 2025-06-23

## 2025-06-23 RX ORDER — DIAZEPAM 10 MG/2ML
10 INJECTION, SOLUTION INTRAMUSCULAR; INTRAVENOUS
Status: DISCONTINUED | OUTPATIENT
Start: 2025-06-23 | End: 2025-06-27 | Stop reason: HOSPADM

## 2025-06-23 RX ORDER — ONDANSETRON 2 MG/ML
4 INJECTION INTRAMUSCULAR; INTRAVENOUS EVERY 4 HOURS PRN
Status: DISCONTINUED | OUTPATIENT
Start: 2025-06-23 | End: 2025-06-27 | Stop reason: HOSPADM

## 2025-06-23 RX ORDER — ONDANSETRON 2 MG/ML
4 INJECTION INTRAMUSCULAR; INTRAVENOUS ONCE
Status: COMPLETED | OUTPATIENT
Start: 2025-06-23 | End: 2025-06-23

## 2025-06-23 RX ORDER — FOLIC ACID 1 MG/1
1 TABLET ORAL DAILY
Status: DISCONTINUED | OUTPATIENT
Start: 2025-06-24 | End: 2025-06-27 | Stop reason: HOSPADM

## 2025-06-23 RX ORDER — MORPHINE SULFATE 4 MG/ML
1 INJECTION INTRAVENOUS EVERY 4 HOURS PRN
Status: DISCONTINUED | OUTPATIENT
Start: 2025-06-23 | End: 2025-06-24

## 2025-06-23 RX ORDER — GAUZE BANDAGE 2" X 2"
100 BANDAGE TOPICAL DAILY
Status: DISCONTINUED | OUTPATIENT
Start: 2025-06-24 | End: 2025-06-27 | Stop reason: HOSPADM

## 2025-06-23 RX ORDER — ENOXAPARIN SODIUM 100 MG/ML
40 INJECTION SUBCUTANEOUS DAILY
Status: DISCONTINUED | OUTPATIENT
Start: 2025-06-24 | End: 2025-06-27 | Stop reason: HOSPADM

## 2025-06-23 RX ORDER — SPIRONOLACTONE 25 MG/1
25 TABLET ORAL 2 TIMES DAILY
Status: ON HOLD | COMMUNITY
End: 2025-07-02

## 2025-06-23 RX ORDER — OXYCODONE HYDROCHLORIDE 5 MG/1
5 TABLET ORAL EVERY 6 HOURS PRN
Status: ON HOLD | COMMUNITY
End: 2025-06-27

## 2025-06-23 RX ORDER — SODIUM CHLORIDE 9 MG/ML
INJECTION, SOLUTION INTRAVENOUS CONTINUOUS
Status: DISCONTINUED | OUTPATIENT
Start: 2025-06-23 | End: 2025-06-27 | Stop reason: HOSPADM

## 2025-06-23 RX ORDER — LABETALOL HYDROCHLORIDE 5 MG/ML
10 INJECTION, SOLUTION INTRAVENOUS EVERY 4 HOURS PRN
Status: DISCONTINUED | OUTPATIENT
Start: 2025-06-23 | End: 2025-06-27 | Stop reason: HOSPADM

## 2025-06-23 RX ORDER — ONDANSETRON 4 MG/1
4 TABLET, ORALLY DISINTEGRATING ORAL EVERY 4 HOURS PRN
Status: DISCONTINUED | OUTPATIENT
Start: 2025-06-23 | End: 2025-06-27 | Stop reason: HOSPADM

## 2025-06-23 RX ORDER — OXYCODONE HYDROCHLORIDE 5 MG/1
5 TABLET ORAL EVERY 6 HOURS PRN
Qty: 10 TABLET | Refills: 0 | Status: SHIPPED | OUTPATIENT
Start: 2025-06-23 | End: 2025-06-26

## 2025-06-23 RX ORDER — OMEPRAZOLE 20 MG/1
40 CAPSULE, DELAYED RELEASE ORAL DAILY
Status: DISCONTINUED | OUTPATIENT
Start: 2025-06-24 | End: 2025-06-27 | Stop reason: HOSPADM

## 2025-06-23 RX ORDER — PROMETHAZINE HYDROCHLORIDE 12.5 MG/1
12.5-25 SUPPOSITORY RECTAL EVERY 4 HOURS PRN
Status: DISCONTINUED | OUTPATIENT
Start: 2025-06-23 | End: 2025-06-27 | Stop reason: HOSPADM

## 2025-06-23 RX ORDER — LORAZEPAM 2 MG/1
4 TABLET ORAL
Status: DISCONTINUED | OUTPATIENT
Start: 2025-06-23 | End: 2025-06-27 | Stop reason: HOSPADM

## 2025-06-23 RX ORDER — PROMETHAZINE HYDROCHLORIDE 25 MG/1
12.5-25 TABLET ORAL EVERY 4 HOURS PRN
Status: DISCONTINUED | OUTPATIENT
Start: 2025-06-23 | End: 2025-06-27 | Stop reason: HOSPADM

## 2025-06-23 RX ORDER — ESCITALOPRAM OXALATE 10 MG/1
10 TABLET ORAL EVERY EVENING
Status: DISCONTINUED | OUTPATIENT
Start: 2025-06-23 | End: 2025-06-27 | Stop reason: HOSPADM

## 2025-06-23 RX ORDER — DIAZEPAM 10 MG/2ML
5 INJECTION, SOLUTION INTRAMUSCULAR; INTRAVENOUS ONCE
Status: ACTIVE | OUTPATIENT
Start: 2025-06-23 | End: 2025-06-24

## 2025-06-23 RX ORDER — LORAZEPAM 1 MG/1
1 TABLET ORAL EVERY 4 HOURS PRN
Status: DISCONTINUED | OUTPATIENT
Start: 2025-06-23 | End: 2025-06-27 | Stop reason: HOSPADM

## 2025-06-23 RX ORDER — LORAZEPAM 0.5 MG/1
0.5 TABLET ORAL EVERY 4 HOURS PRN
Status: DISCONTINUED | OUTPATIENT
Start: 2025-06-23 | End: 2025-06-27 | Stop reason: HOSPADM

## 2025-06-23 RX ORDER — OXYCODONE HYDROCHLORIDE 5 MG/1
5 TABLET ORAL EVERY 4 HOURS PRN
Refills: 0 | Status: DISCONTINUED | OUTPATIENT
Start: 2025-06-23 | End: 2025-06-24

## 2025-06-23 RX ORDER — ONDANSETRON 2 MG/ML
4 INJECTION INTRAMUSCULAR; INTRAVENOUS ONCE
Status: DISCONTINUED | OUTPATIENT
Start: 2025-06-23 | End: 2025-06-23

## 2025-06-23 RX ORDER — GABAPENTIN 100 MG/1
100 CAPSULE ORAL 3 TIMES DAILY
Status: DISCONTINUED | OUTPATIENT
Start: 2025-06-23 | End: 2025-06-27 | Stop reason: HOSPADM

## 2025-06-23 RX ORDER — PROPRANOLOL HYDROCHLORIDE 10 MG/1
10 TABLET ORAL 2 TIMES DAILY
Status: DISCONTINUED | OUTPATIENT
Start: 2025-06-23 | End: 2025-06-23

## 2025-06-23 RX ADMIN — ONDANSETRON 4 MG: 2 INJECTION INTRAMUSCULAR; INTRAVENOUS at 20:26

## 2025-06-23 RX ADMIN — MORPHINE SULFATE 4 MG: 4 INJECTION INTRAVENOUS at 16:42

## 2025-06-23 RX ADMIN — MORPHINE SULFATE 1 MG: 4 INJECTION INTRAVENOUS at 22:18

## 2025-06-23 RX ADMIN — FOLIC ACID: 5 INJECTION, SOLUTION INTRAMUSCULAR; INTRAVENOUS; SUBCUTANEOUS at 20:32

## 2025-06-23 RX ADMIN — ONDANSETRON 4 MG: 2 INJECTION INTRAMUSCULAR; INTRAVENOUS at 16:42

## 2025-06-23 RX ADMIN — OXYCODONE 5 MG: 5 TABLET ORAL at 20:25

## 2025-06-23 RX ADMIN — ESCITALOPRAM OXALATE 10 MG: 10 TABLET ORAL at 20:26

## 2025-06-23 RX ADMIN — GABAPENTIN 100 MG: 100 CAPSULE ORAL at 20:25

## 2025-06-23 RX ADMIN — MORPHINE SULFATE 6 MG: 10 INJECTION INTRAVENOUS at 14:57

## 2025-06-23 RX ADMIN — SODIUM CHLORIDE: 9 INJECTION, SOLUTION INTRAVENOUS at 20:04

## 2025-06-23 SDOH — ECONOMIC STABILITY: TRANSPORTATION INSECURITY
IN THE PAST 12 MONTHS, HAS LACK OF RELIABLE TRANSPORTATION KEPT YOU FROM MEDICAL APPOINTMENTS, MEETINGS, WORK OR FROM GETTING THINGS NEEDED FOR DAILY LIVING?: YES

## 2025-06-23 SDOH — ECONOMIC STABILITY: TRANSPORTATION INSECURITY
IN THE PAST 12 MONTHS, HAS THE LACK OF TRANSPORTATION KEPT YOU FROM MEDICAL APPOINTMENTS OR FROM GETTING MEDICATIONS?: YES

## 2025-06-23 ASSESSMENT — ENCOUNTER SYMPTOMS
WEAKNESS: 1
CONSTIPATION: 0
HEMOPTYSIS: 0
NERVOUS/ANXIOUS: 1
WEIGHT LOSS: 0
FEVER: 0
CLAUDICATION: 0
DIARRHEA: 0
COUGH: 0
CHILLS: 0
DIZZINESS: 0
TINGLING: 0
ABDOMINAL PAIN: 0
SPEECH CHANGE: 0
MYALGIAS: 0
VOMITING: 0
TREMORS: 1
NECK PAIN: 0
DOUBLE VISION: 0
PHOTOPHOBIA: 0
PALPITATIONS: 0
NAUSEA: 0
ORTHOPNEA: 0
BLURRED VISION: 0

## 2025-06-23 ASSESSMENT — COGNITIVE AND FUNCTIONAL STATUS - GENERAL
DAILY ACTIVITIY SCORE: 15
SUGGESTED CMS G CODE MODIFIER DAILY ACTIVITY: CK
TURNING FROM BACK TO SIDE WHILE IN FLAT BAD: A LOT
EATING MEALS: A LITTLE
MOVING TO AND FROM BED TO CHAIR: A LOT
DRESSING REGULAR LOWER BODY CLOTHING: A LOT
WALKING IN HOSPITAL ROOM: TOTAL
SUGGESTED CMS G CODE MODIFIER MOBILITY: CL
STANDING UP FROM CHAIR USING ARMS: A LOT
DRESSING REGULAR UPPER BODY CLOTHING: A LOT
TOILETING: A LOT
MOVING FROM LYING ON BACK TO SITTING ON SIDE OF FLAT BED: A LOT
CLIMB 3 TO 5 STEPS WITH RAILING: TOTAL
HELP NEEDED FOR BATHING: A LOT
MOBILITY SCORE: 10

## 2025-06-23 ASSESSMENT — LIFESTYLE VARIABLES
NAUSEA AND VOMITING: MILD NAUSEA WITH NO VOMITING
TOTAL SCORE: 1
HOW MANY TIMES IN THE PAST YEAR HAVE YOU HAD 5 OR MORE DRINKS IN A DAY: 0
ORIENTATION AND CLOUDING OF SENSORIUM: ORIENTED AND CAN DO SERIAL ADDITIONS
TOTAL SCORE: 4
TREMOR: *
VISUAL DISTURBANCES: NOT PRESENT
ON A TYPICAL DAY WHEN YOU DRINK ALCOHOL HOW MANY DRINKS DO YOU HAVE: 1
HAVE YOU EVER FELT YOU SHOULD CUT DOWN ON YOUR DRINKING: YES
AGITATION: NORMAL ACTIVITY
HEADACHE, FULLNESS IN HEAD: NOT PRESENT
PAROXYSMAL SWEATS: NO SWEAT VISIBLE
TOTAL SCORE: 1
DOES PATIENT WANT TO STOP DRINKING: YES
EVER HAD A DRINK FIRST THING IN THE MORNING TO STEADY YOUR NERVES TO GET RID OF A HANGOVER: NO
AVERAGE NUMBER OF DAYS PER WEEK YOU HAVE A DRINK CONTAINING ALCOHOL: 1
TOTAL SCORE: 1
CONSUMPTION TOTAL: NEGATIVE
DOES PATIENT WANT TO TALK TO SOMEONE ABOUT QUITTING: YES
HAVE PEOPLE ANNOYED YOU BY CRITICIZING YOUR DRINKING: NO
ALCOHOL_USE: YES
EVER FELT BAD OR GUILTY ABOUT YOUR DRINKING: NO
ANXIETY: NO ANXIETY (AT EASE)
AUDITORY DISTURBANCES: NOT PRESENT

## 2025-06-23 ASSESSMENT — SOCIAL DETERMINANTS OF HEALTH (SDOH)
WITHIN THE LAST YEAR, HAVE TO BEEN RAPED OR FORCED TO HAVE ANY KIND OF SEXUAL ACTIVITY BY YOUR PARTNER OR EX-PARTNER?: NO
WITHIN THE PAST 12 MONTHS, THE FOOD YOU BOUGHT JUST DIDN'T LAST AND YOU DIDN'T HAVE MONEY TO GET MORE: NEVER TRUE
WITHIN THE LAST YEAR, HAVE YOU BEEN AFRAID OF YOUR PARTNER OR EX-PARTNER?: NO
WITHIN THE PAST 12 MONTHS, YOU WORRIED THAT YOUR FOOD WOULD RUN OUT BEFORE YOU GOT THE MONEY TO BUY MORE: NEVER TRUE
WITHIN THE LAST YEAR, HAVE YOU BEEN HUMILIATED OR EMOTIONALLY ABUSED IN OTHER WAYS BY YOUR PARTNER OR EX-PARTNER?: NO
WITHIN THE LAST YEAR, HAVE YOU BEEN KICKED, HIT, SLAPPED, OR OTHERWISE PHYSICALLY HURT BY YOUR PARTNER OR EX-PARTNER?: NO
IN THE PAST 12 MONTHS, HAS THE ELECTRIC, GAS, OIL, OR WATER COMPANY THREATENED TO SHUT OFF SERVICE IN YOUR HOME?: NO

## 2025-06-23 ASSESSMENT — PAIN DESCRIPTION - PAIN TYPE
TYPE: ACUTE PAIN

## 2025-06-23 ASSESSMENT — PATIENT HEALTH QUESTIONNAIRE - PHQ9
SUM OF ALL RESPONSES TO PHQ9 QUESTIONS 1 AND 2: 0
1. LITTLE INTEREST OR PLEASURE IN DOING THINGS: NOT AT ALL
2. FEELING DOWN, DEPRESSED, IRRITABLE, OR HOPELESS: NOT AT ALL

## 2025-06-23 ASSESSMENT — FIBROSIS 4 INDEX: FIB4 SCORE: 0.83

## 2025-06-23 NOTE — ED PROVIDER NOTES
"ED Provider Note    CHIEF COMPLAINT  Chief Complaint   Patient presents with    Ankle Injury         EXTERNAL RECORDS REVIEWED  Clinic note from March 31 reviewed for flulike symptoms.  She has cirrhosis with ascites.    MARY ELLEN    Linda Carpenter is a 29 y.o. female who presents to the Emergency Department for left ankle pain and injury.  The patient fell while intoxicated camping Saturday.  She presented to [facility] yesterday and was diagnosed with a fracture and splinted.  She was told to follow-up at [facility] orthopedics expecting that she was scheduled to see them but on arrival she had no appointment.  They referred her to the ER.  She complains of severe pain.  She has crutches.  She has difficulty ambulating with crutches.  She denies other injury.  She denies ongoing alcohol use but admitted to drinking Saturday.    LIMITATION TO HISTORY   None    OUTSIDE HISTORIAN(S):  None    REVIEW OF SYSTEMS  Pertinent positives include: Left ankle pain after fall while drinking alcohol, history of alcoholic cirrhosis.  Pertinent negatives include: Weakness numbness fever cold blue foot.      PAST MEDICAL HISTORY  Past Medical History[1]    FAMILY HISTORY  Family History   Problem Relation Age of Onset    Other Mother         fribromyalgia     Other Father         kidney failure    Asthma Brother        SOCIAL HISTORY  Social History[2]  Social History     Substance and Sexual Activity   Drug Use No       SURGICAL HISTORY  Past Surgical History[3]    CURRENT MEDICATIONS  Current Medications[4]    ALLERGIES  Allergies[5]    PHYSICAL EXAM  VITAL SIGNS: BP (!) 141/91   Pulse 91   Temp (!) 35.7 °C (96.3 °F) (Temporal)   Resp 20   Ht 1.651 m (5' 5\")   Wt 75.3 kg (166 lb)   LMP  (LMP Unknown)   SpO2 94%   BMI 27.62 kg/m²   Reviewed and hypertensive  Constitutional: Well developed, Well nourished, appears to be in pain.  HENT: Normocephalic, atraumatic, bilateral external ears normal, No intraoral erythema, edema, " exudate  Eyes: PERRLA, conjunctiva pink, no scleral icterus.   Cardiovascular: Regular rate and rhythm. No murmurs, rubs or gallops.  No dependent edema or calf tenderness  Respiratory: Lungs clear to auscultation bilaterally. No wheezes, rales, or rhonchi.  Abdominal:  Abdomen soft, non-tender, non distended. No rebound, or guarding.    Skin: No erythema, no rash. No wounds or bruising.  Genitourinary: No costovertebral angle tenderness.   Musculoskeletal: no deformities.   Neurologic: Alert & oriented x 3, cranial nerves 2-12 intact by passive exam.  Moves 4 limbs with symmetric strength.  Psychiatric: Affect normal, Judgment normal, Mood normal.     Interpretations:    RADIOLOGY  I have independently interpreted the left ankle and tib-fib associated with this visit demonstrating comminuted distal tibia intra-articular fracture.  I am awaiting the final reading from the radiologist.     Final Radiology Report     [id number] W/O PLUS RECONS LEFT   Final Result      1.  Mildly displaced, comminuted, intra-articular fracture of the distal tibia extending to the distal tibial articular surface.   2.  Small ankle joint effusion.      DX-ANKLE 3+ VIEWS LEFT   Final Result      Comminuted intra-articular fracture of the distal left tibia with regional soft tissue swelling.      DX-TIBIA AND FIBULA LEFT   Final Result      Acute closed nondisplaced intra-articular spiral fracture of the distal left tibia. Regional soft tissue swelling.     Radiologist interpretation have been reviewed by me.     ED COURSE:    INTERVENTIONS BY ME:  Morphine IV grams  Zofran 4 mg IV  Lorazepam 1 mg IV for tremors and suspected alcohol withdrawal     - Patient reassessed and response to intervention patient felt she could not ambulate with crutches due to tremor.  She denied alcohol withdrawal or recent alcohol use.  She requested a wheelchair with I discussed with social work.      ASSESSMENT, COURSE AND PLAN:  PROBLEMS EVALUATED THIS  VISIT:    This patient presents with left leg and ankle pain and has a comminuted intra-articular left ankle fracture of the distal tibia.  I discussed this with Dr. Alcocer who preferred to treat this surgically from clinic in 2 to 3 days.  He requested that I obtain a CT scan of the tibia which I did.  The patient was treated with morphine for pain.  There is no evidence of compartment syndrome.  Her splint was reinforced with Stirups.  She is neurovascularly intact.  The patient also has a history of alcohol abuse, alcohol-related cirrhosis and tremor here.  She denies recent alcohol use but does admit to some intermittent use.  Is possible she is in early withdrawal.  This will be further assessed by Dr. Barriga    Measures: ED Observation Status? Yes: Patient placed in observation status due to diagnostic uncertainty and therapy intensity at 4:02 PM 06/23/25 for CT scanning of her tibia.    5:30 PM care signed over to Dr. Barriga to check CT results, to determine if obtaining a wheelchair is possible, and to treat with withdrawal if it becomes more apparent that she is experiencing alcohol withdrawal    DISPOSITION AND DISCUSSIONS    I have discussed management of the patient with the following physicians and sources:   Dr. Alcocer and Dr. Barriga    RISK:  High given need for IV opiate analgesic     PLAN:  CT imaging, wheelchair, outpatient surgical treatment of fracture, further observation for alcohol withdrawal and if necessary treatment    CONDITION: Fair.     Interim IMPRESSION  1. Closed displaced comminuted fracture of shaft of left tibia, initial encounter    2.      Possible alcohol withdrawal  ED observation care    Jeferson Santos M.D., 06/24/25 12:22 PM         [1]   Past Medical History:  Diagnosis Date    Anemia 9/4/2024    Coagulopathy (HCC) 9/4/2024    Gastroesophageal reflux disease 9/4/2024   [2]   Social History  Tobacco Use    Smoking status: Former     Current packs/day: 0.25      Average packs/day: 0.3 packs/day for 7.0 years (1.8 ttl pk-yrs)     Types: Cigarettes    Smokeless tobacco: Never    Tobacco comments:     Trying to quit smoking. 3 cigarettes/day now   Vaping Use    Vaping status: Every Day    Substances: Nicotine   Substance Use Topics    Alcohol use: No    Drug use: No   [3]   Past Surgical History:  Procedure Laterality Date    WA UPPER GI ENDOSCOPY,DIAGNOSIS N/A 9/4/2024    Procedure: GASTROSCOPY;  Surgeon: Giuliano Fountain M.D.;  Location: SURGERY SAME DAY [facility];  Service: Gastroenterology    WA COLONOSCOPY-FLEXIBLE N/A 9/4/2024    Procedure: COLONOSCOPY;  Surgeon: Giuliano Fountain M.D.;  Location: SURGERY SAME DAY [facility];  Service: Gastroenterology   [4] No current facility-administered medications for this encounter.    Current Outpatient Medications:     celecoxib (CELEBREX) 100 MG Cap, Take 100 mg by mouth 2 times a day., Disp: , Rfl:     gabapentin (NEURONTIN) 100 MG Cap, Take 100 mg by mouth 3 times a day., Disp: , Rfl:     propranolol (INDERAL) 10 MG Tab, Take 10 mg by mouth 2 times a day., Disp: , Rfl:     pantoprazole (PROTONIX) 40 MG Tablet Delayed Response, Take 1 Tablet by mouth every day., Disp: 30 Tablet, Rfl: 0    escitalopram (LEXAPRO) 10 MG Tab, Take 10 mg by mouth every day., Disp: , Rfl:   [5]   Allergies  Allergen Reactions    Cefaclor Unspecified     RXN= as baby (increased symptoms)    Sulfamethoxazole W-Trimethoprim Rash     Rash

## 2025-06-23 NOTE — DISCHARGE INSTRUCTIONS
Keep splint on at all times.  Elevate your leg.  Remain nonweightbearing and use crutches.  Take oxycodone as needed for pain.  Call [facility] for a follow-up appointment in 3 days and they will schedule you for surgery.  Return to the ER for severe leg pain or for cold blue foot    FOLLOW UP ITEMS POST DISCHARGE  -Continue skilled therapies at the acute rehab hospital. TTWB on LLE.  - Continue to optimize pain control.  - Continue bowel regimen.  - Continue pharmacologic DVT prophylaxis with Lovenox SQ.  - Outpatient follow-up with orthopedics.  - IRF physician to follow for continued management of ongoing/chronic medical issues.    ACTIVITY  As tolerated and directed by rehab.  Touch-down weight bearing LEFT leg    Diet    Resume your normal diet as tolerated.  A diet low in cholesterol, fat, and sodium is recommended for good health.     Tibial Plateau Fracture Treated With ORIF, Care After  This sheet gives you information about how to care for yourself after your procedure. Your health care provider may also give you more specific instructions. If you have problems or questions, contact your health care provider.  What can I expect after the procedure?  After the procedure, it is common to have:  Pain.  Swelling.  Stiffness.  Tingling or numbness.  A small amount of fluid from your incision.  Follow these instructions at home:  If you have a brace:  Wear the brace as told by your health care provider. Remove it only as told by your health care provider.  Loosen the brace if your toes tingle, become numb, or turn cold and blue.  Keep the brace clean.  Incision care    Follow instructions from your health care provider about how to take care of your incision. Make sure you:  Wash your hands with soap and water for at least 20 seconds before and after you change your bandage (dressing). If soap and water are not available, use hand .  Change your dressing as told by your health care  provider.  Leave stitches (sutures), skin glue, or adhesive strips in place. These skin closures may need to stay in place for 2 weeks or longer. If adhesive strip edges start to loosen and curl up, you may trim the loose edges. Do not remove adhesive strips completely unless your health care provider tells you to do that.  Check your incision area every day for signs of infection. Check for:  Redness.  More swelling or pain.  Blood or more fluid.  Warmth.  Pus or a bad smell.  Bathing  Do not take baths, swim, or use a hot tub until your health care provider approves. Ask your health care provider if you can take showers. You may only be allowed to take sponge baths.  If your brace is not waterproof:  Do not let it get wet.  Cover it with a watertight covering when you take a bath or a shower.  Keep the dressing dry until your health care provider says it can be removed.  Managing pain, stiffness, and swelling    If directed, put ice on the affected area. To do this:  If you have a removable brace, remove it as told by your health care provider.  Put ice in a plastic bag.  Place a towel between your skin and the bag or between your brace and the bag.  Leave the ice on for 20 minutes, 2-3 times a day.  Remove the ice if your skin turns bright red. This is very important. If you cannot feel pain, heat, or cold, you have a greater risk of damage to the area.  Move your toes often to reduce stiffness and swelling.  Raise (elevate) the injured leg above the level of your heart while you are sitting or lying down. To do this, try putting a few pillows under your knee and lower leg.  Driving  Ask your health care provider if the medicine prescribed to you requires you to avoid driving or using machinery.  Ask your health care provider when it is safe to drive if you have a brace on your leg.  Activity  Return to your normal activities as told by your health care provider. Ask your health care provider what activities are  safe for you.  Do exercises as told by your health care provider or physical therapist.  Do not use your injured limb to support (bear) your body weight until your health care provider says that you can. Follow weight-bearing restrictions as told. Use crutches or a walker as told by your health care provider.  General instructions  Take over-the-counter and prescription medicines only as told by your health care provider.  Do not use any products that contain nicotine or tobacco, such as cigarettes, e-cigarettes, and chewing tobacco. These can delay bone healing. If you need help quitting, ask your health care provider.  Ask your health care provider if the medicine prescribed to you can cause constipation. You may need to take these actions to prevent or treat constipation:  Drink enough fluid to keep your urine pale yellow.  Take over-the-counter or prescription medicines.  Eat foods that are high in fiber, such as beans, whole grains, and fresh fruits and vegetables.  Limit foods that are high in fat and processed sugars, such as fried or sweet foods.  Keep all follow-up visits. This is important.  Contact a health care provider if you:  Have a fever or chills.  Have pain that is not helped with medicine.  Have redness around your incision.  Have more swelling or pain around your incision.  Have blood or more fluid coming from your incision or leaking through your dressing.  Notice that your incision feels warm.  Have pus or a bad smell coming from your incision area.  Get help right away if you:  Notice that the edges of your incision have come apart after the sutures or staples have been removed.  Have pain, warmth, or tenderness in the back of your lower leg (calf).  Have trouble breathing.  Have chest pain.  These symptoms may represent a serious problem that is an emergency. Do not wait to see if the symptoms will go away. Get medical help right away. Call your local emergency services (911 in the [place] Do  not drive yourself to the hospital.  Summary  After the procedure, it is common to have some pain, swelling, tingling, or numbness.  If you have a brace, wear it as told by your health care provider. Remove it only as told by your health care provider.  Return to your normal activities as told by your health care provider. Ask your health care provider what activities are safe for you.  Contact your health care provider if you have blood or more fluid leaking through your dressing.  This information is not intended to replace advice given to you by your health care provider. Make sure you discuss any questions you have with your health care provider.  Document Revised: 06/02/2021 Document Reviewed: 06/02/2021  Elsevier Patient Education © 2023 [employer]

## 2025-06-23 NOTE — ED TRIAGE NOTES
"Chief Complaint   Patient presents with    Ankle Injury     Pt says she was seen at [facility] on 6/22 for a left, tibia spiral fx and was told to have sx today. Pt says when they arrived she was not on the schedule and was told to come here instead. Leg is wrapped upon arrival, pt c/o severe pain and is shaking in triage. Pt has been taking Oxycodone at home w/o relief.     Blood Pressure: (!) 143/101, Pulse: (!) 109, Respiration: 18, Temperature: (!) 35.7 °C (96.3 °F), Height: 165.1 cm (5' 5\"), Weight: 75.3 kg (166 lb), BMI (Calculated): 27.62, BSA (Calculated): 1.9, Pulse Oximetry: 94 %    " 2018

## 2025-06-23 NOTE — ED NOTES
Pt stating her pain too high to go to ct and transfer to and from wheelchair. ERP notified. Pt medicated according to MAR. CT notified pt is now willing to go to ct in a gurney for transport.

## 2025-06-24 ENCOUNTER — ANESTHESIA EVENT (OUTPATIENT)
Dept: SURGERY | Facility: MEDICAL CENTER | Age: 30
End: 2025-06-24
Payer: MEDICAID

## 2025-06-24 ENCOUNTER — ANESTHESIA (OUTPATIENT)
Dept: SURGERY | Facility: MEDICAL CENTER | Age: 30
End: 2025-06-24
Payer: MEDICAID

## 2025-06-24 ENCOUNTER — APPOINTMENT (OUTPATIENT)
Dept: RADIOLOGY | Facility: MEDICAL CENTER | Age: 30
End: 2025-06-24
Attending: INTERNAL MEDICINE
Payer: MEDICAID

## 2025-06-24 ENCOUNTER — APPOINTMENT (OUTPATIENT)
Dept: RADIOLOGY | Facility: MEDICAL CENTER | Age: 30
End: 2025-06-24
Attending: STUDENT IN AN ORGANIZED HEALTH CARE EDUCATION/TRAINING PROGRAM
Payer: MEDICAID

## 2025-06-24 PROBLEM — D53.9 MACROCYTIC ANEMIA: Status: ACTIVE | Noted: 2024-09-04

## 2025-06-24 LAB
25(OH)D3 SERPL-MCNC: 28 NG/ML (ref 30–100)
ERYTHROCYTE [DISTWIDTH] IN BLOOD BY AUTOMATED COUNT: 78.2 FL (ref 35.9–50)
HCG SERPL QL: NEGATIVE
HCT VFR BLD AUTO: 33.2 % (ref 37–47)
HGB BLD-MCNC: 11 G/DL (ref 12–16)
MCH RBC QN AUTO: 36.3 PG (ref 27–33)
MCHC RBC AUTO-ENTMCNC: 33.1 G/DL (ref 32.2–35.5)
MCV RBC AUTO: 109.6 FL (ref 81.4–97.8)
PLATELET # BLD AUTO: 335 K/UL (ref 164–446)
PMV BLD AUTO: 9.9 FL (ref 9–12.9)
RBC # BLD AUTO: 3.03 M/UL (ref 4.2–5.4)
WBC # BLD AUTO: 8.4 K/UL (ref 4.8–10.8)

## 2025-06-24 PROCEDURE — A9270 NON-COVERED ITEM OR SERVICE: HCPCS | Performed by: INTERNAL MEDICINE

## 2025-06-24 PROCEDURE — 700111 HCHG RX REV CODE 636 W/ 250 OVERRIDE (IP): Mod: JZ | Performed by: STUDENT IN AN ORGANIZED HEALTH CARE EDUCATION/TRAINING PROGRAM

## 2025-06-24 PROCEDURE — 700105 HCHG RX REV CODE 258: Performed by: STUDENT IN AN ORGANIZED HEALTH CARE EDUCATION/TRAINING PROGRAM

## 2025-06-24 PROCEDURE — 700111 HCHG RX REV CODE 636 W/ 250 OVERRIDE (IP): Performed by: INTERNAL MEDICINE

## 2025-06-24 PROCEDURE — 36415 COLL VENOUS BLD VENIPUNCTURE: CPT

## 2025-06-24 PROCEDURE — 160002 HCHG RECOVERY MINUTES (STAT): Performed by: STUDENT IN AN ORGANIZED HEALTH CARE EDUCATION/TRAINING PROGRAM

## 2025-06-24 PROCEDURE — 700111 HCHG RX REV CODE 636 W/ 250 OVERRIDE (IP): Mod: JZ | Performed by: INTERNAL MEDICINE

## 2025-06-24 PROCEDURE — 770001 HCHG ROOM/CARE - MED/SURG/GYN PRIV*

## 2025-06-24 PROCEDURE — 82306 VITAMIN D 25 HYDROXY: CPT

## 2025-06-24 PROCEDURE — 700102 HCHG RX REV CODE 250 W/ 637 OVERRIDE(OP): Performed by: STUDENT IN AN ORGANIZED HEALTH CARE EDUCATION/TRAINING PROGRAM

## 2025-06-24 PROCEDURE — 160015 HCHG STAT PREOP MINUTES: Performed by: STUDENT IN AN ORGANIZED HEALTH CARE EDUCATION/TRAINING PROGRAM

## 2025-06-24 PROCEDURE — A9270 NON-COVERED ITEM OR SERVICE: HCPCS | Performed by: STUDENT IN AN ORGANIZED HEALTH CARE EDUCATION/TRAINING PROGRAM

## 2025-06-24 PROCEDURE — 85027 COMPLETE CBC AUTOMATED: CPT

## 2025-06-24 PROCEDURE — 160029 HCHG SURGERY MINUTES - 1ST 30 MINS LEVEL 4: Performed by: STUDENT IN AN ORGANIZED HEALTH CARE EDUCATION/TRAINING PROGRAM

## 2025-06-24 PROCEDURE — 700102 HCHG RX REV CODE 250 W/ 637 OVERRIDE(OP): Performed by: INTERNAL MEDICINE

## 2025-06-24 PROCEDURE — 160193 HCHG PACU STANDARD - 1ST 60 MINS: Performed by: STUDENT IN AN ORGANIZED HEALTH CARE EDUCATION/TRAINING PROGRAM

## 2025-06-24 PROCEDURE — 160048 HCHG OR STATISTICAL LEVEL 1-5: Performed by: STUDENT IN AN ORGANIZED HEALTH CARE EDUCATION/TRAINING PROGRAM

## 2025-06-24 PROCEDURE — 99233 SBSQ HOSP IP/OBS HIGH 50: CPT | Performed by: INTERNAL MEDICINE

## 2025-06-24 PROCEDURE — 700101 HCHG RX REV CODE 250: Performed by: STUDENT IN AN ORGANIZED HEALTH CARE EDUCATION/TRAINING PROGRAM

## 2025-06-24 PROCEDURE — 27745 REINFORCE TIBIA: CPT | Mod: 80ROC,51ROC,59,LT | Performed by: STUDENT IN AN ORGANIZED HEALTH CARE EDUCATION/TRAINING PROGRAM

## 2025-06-24 PROCEDURE — 73590 X-RAY EXAM OF LOWER LEG: CPT | Mod: LT

## 2025-06-24 PROCEDURE — 160192 HCHG ANESTHESIA COMPLEX: Performed by: STUDENT IN AN ORGANIZED HEALTH CARE EDUCATION/TRAINING PROGRAM

## 2025-06-24 PROCEDURE — 27827 TREAT LOWER LEG FRACTURE: CPT | Mod: LT | Performed by: STUDENT IN AN ORGANIZED HEALTH CARE EDUCATION/TRAINING PROGRAM

## 2025-06-24 PROCEDURE — C1713 ANCHOR/SCREW BN/BN,TIS/BN: HCPCS | Performed by: STUDENT IN AN ORGANIZED HEALTH CARE EDUCATION/TRAINING PROGRAM

## 2025-06-24 PROCEDURE — 82105 ALPHA-FETOPROTEIN SERUM: CPT

## 2025-06-24 PROCEDURE — 76705 ECHO EXAM OF ABDOMEN: CPT

## 2025-06-24 PROCEDURE — 27827 TREAT LOWER LEG FRACTURE: CPT | Mod: 80ROC,LT | Performed by: STUDENT IN AN ORGANIZED HEALTH CARE EDUCATION/TRAINING PROGRAM

## 2025-06-24 PROCEDURE — 0QSH04Z REPOSITION LEFT TIBIA WITH INTERNAL FIXATION DEVICE, OPEN APPROACH: ICD-10-PCS | Performed by: STUDENT IN AN ORGANIZED HEALTH CARE EDUCATION/TRAINING PROGRAM

## 2025-06-24 PROCEDURE — 99285 EMERGENCY DEPT VISIT HI MDM: CPT

## 2025-06-24 PROCEDURE — 160041 HCHG SURGERY MINUTES - EA ADDL 1 MIN LEVEL 4: Performed by: STUDENT IN AN ORGANIZED HEALTH CARE EDUCATION/TRAINING PROGRAM

## 2025-06-24 PROCEDURE — 700105 HCHG RX REV CODE 258: Performed by: INTERNAL MEDICINE

## 2025-06-24 PROCEDURE — 700111 HCHG RX REV CODE 636 W/ 250 OVERRIDE (IP)

## 2025-06-24 PROCEDURE — 27745 REINFORCE TIBIA: CPT | Mod: 51ROC,59,LT | Performed by: STUDENT IN AN ORGANIZED HEALTH CARE EDUCATION/TRAINING PROGRAM

## 2025-06-24 PROCEDURE — 110371 HCHG SHELL REV 272: Performed by: STUDENT IN AN ORGANIZED HEALTH CARE EDUCATION/TRAINING PROGRAM

## 2025-06-24 DEVICE — LOCKING SCREW DIA 5X40MM: Type: IMPLANTABLE DEVICE | Site: LEG | Status: FUNCTIONAL

## 2025-06-24 DEVICE — SCREW BONE ASNIS III TITANIUM PARTIAL THREAD REVERSE CUT FLUTE 40 MM 4 MM CANNULATED HEAD: Type: IMPLANTABLE DEVICE | Site: LEG | Status: FUNCTIONAL

## 2025-06-24 DEVICE — TIBIAL NAIL 10X330MM: Type: IMPLANTABLE DEVICE | Site: LEG | Status: FUNCTIONAL

## 2025-06-24 DEVICE — LOCKING SCREW DIA 5X35MM: Type: IMPLANTABLE DEVICE | Site: LEG | Status: FUNCTIONAL

## 2025-06-24 DEVICE — 4.0 X42MM TI CANNULATED SCREW: Type: IMPLANTABLE DEVICE | Site: LEG | Status: FUNCTIONAL

## 2025-06-24 DEVICE — PLATE BONE SPS TITANIUM 1/3 TUBULAR L77 MM 6 HOLE COLLAR SMALL FRAGMENT SET: Type: IMPLANTABLE DEVICE | Site: LEG | Status: FUNCTIONAL

## 2025-06-24 DEVICE — LOCKING SCREW DIA 5X42MM: Type: IMPLANTABLE DEVICE | Site: LEG | Status: FUNCTIONAL

## 2025-06-24 DEVICE — LOCKING SCREW DIA 5X37.5MM: Type: IMPLANTABLE DEVICE | Site: LEG | Status: FUNCTIONAL

## 2025-06-24 RX ORDER — LIDOCAINE HYDROCHLORIDE 20 MG/ML
INJECTION, SOLUTION EPIDURAL; INFILTRATION; INTRACAUDAL; PERINEURAL PRN
Status: DISCONTINUED | OUTPATIENT
Start: 2025-06-24 | End: 2025-06-24 | Stop reason: SURG

## 2025-06-24 RX ORDER — MIDAZOLAM HYDROCHLORIDE 1 MG/ML
1 INJECTION INTRAMUSCULAR; INTRAVENOUS
Status: DISCONTINUED | OUTPATIENT
Start: 2025-06-24 | End: 2025-06-24 | Stop reason: HOSPADM

## 2025-06-24 RX ORDER — ACETAMINOPHEN 500 MG
1000 TABLET ORAL EVERY 6 HOURS PRN
Status: DISCONTINUED | OUTPATIENT
Start: 2025-06-29 | End: 2025-06-27 | Stop reason: HOSPADM

## 2025-06-24 RX ORDER — HYDRALAZINE HYDROCHLORIDE 20 MG/ML
5 INJECTION INTRAMUSCULAR; INTRAVENOUS
Status: DISCONTINUED | OUTPATIENT
Start: 2025-06-24 | End: 2025-06-24 | Stop reason: HOSPADM

## 2025-06-24 RX ORDER — LABETALOL HYDROCHLORIDE 5 MG/ML
5 INJECTION, SOLUTION INTRAVENOUS
Status: DISCONTINUED | OUTPATIENT
Start: 2025-06-24 | End: 2025-06-24 | Stop reason: HOSPADM

## 2025-06-24 RX ORDER — KETOROLAC TROMETHAMINE 15 MG/ML
15 INJECTION, SOLUTION INTRAMUSCULAR; INTRAVENOUS EVERY 6 HOURS
Status: DISPENSED | OUTPATIENT
Start: 2025-06-24 | End: 2025-06-27

## 2025-06-24 RX ORDER — METOPROLOL TARTRATE 1 MG/ML
1 INJECTION, SOLUTION INTRAVENOUS
Status: DISCONTINUED | OUTPATIENT
Start: 2025-06-24 | End: 2025-06-24 | Stop reason: HOSPADM

## 2025-06-24 RX ORDER — HALOPERIDOL 5 MG/ML
1 INJECTION INTRAMUSCULAR
Status: DISCONTINUED | OUTPATIENT
Start: 2025-06-24 | End: 2025-06-24 | Stop reason: HOSPADM

## 2025-06-24 RX ORDER — OXYCODONE HCL 5 MG/5 ML
5 SOLUTION, ORAL ORAL
Status: COMPLETED | OUTPATIENT
Start: 2025-06-24 | End: 2025-06-24

## 2025-06-24 RX ORDER — DEXAMETHASONE SODIUM PHOSPHATE 4 MG/ML
INJECTION, SOLUTION INTRA-ARTICULAR; INTRALESIONAL; INTRAMUSCULAR; INTRAVENOUS; SOFT TISSUE PRN
Status: DISCONTINUED | OUTPATIENT
Start: 2025-06-24 | End: 2025-06-24 | Stop reason: SURG

## 2025-06-24 RX ORDER — OXYCODONE HCL 5 MG/5 ML
10 SOLUTION, ORAL ORAL
Status: COMPLETED | OUTPATIENT
Start: 2025-06-24 | End: 2025-06-24

## 2025-06-24 RX ORDER — MEPERIDINE HYDROCHLORIDE 50 MG/ML
6.25 INJECTION INTRAMUSCULAR; INTRAVENOUS; SUBCUTANEOUS
Status: DISCONTINUED | OUTPATIENT
Start: 2025-06-24 | End: 2025-06-24 | Stop reason: HOSPADM

## 2025-06-24 RX ORDER — MORPHINE SULFATE 4 MG/ML
3 INJECTION INTRAVENOUS EVERY 4 HOURS PRN
Status: DISCONTINUED | OUTPATIENT
Start: 2025-06-24 | End: 2025-06-24

## 2025-06-24 RX ORDER — HYDROMORPHONE HYDROCHLORIDE 1 MG/ML
0.5 INJECTION, SOLUTION INTRAMUSCULAR; INTRAVENOUS; SUBCUTANEOUS
Status: DISCONTINUED | OUTPATIENT
Start: 2025-06-24 | End: 2025-06-27 | Stop reason: HOSPADM

## 2025-06-24 RX ORDER — SCOPOLAMINE 1 MG/3D
1 PATCH, EXTENDED RELEASE TRANSDERMAL
Status: DISCONTINUED | OUTPATIENT
Start: 2025-06-24 | End: 2025-06-24 | Stop reason: HOSPADM

## 2025-06-24 RX ORDER — MAGNESIUM HYDROXIDE 1200 MG/15ML
LIQUID ORAL
Status: COMPLETED | OUTPATIENT
Start: 2025-06-24 | End: 2025-06-24

## 2025-06-24 RX ORDER — ONDANSETRON 2 MG/ML
INJECTION INTRAMUSCULAR; INTRAVENOUS PRN
Status: DISCONTINUED | OUTPATIENT
Start: 2025-06-24 | End: 2025-06-24 | Stop reason: SURG

## 2025-06-24 RX ORDER — IBUPROFEN 800 MG/1
800 TABLET, FILM COATED ORAL 3 TIMES DAILY PRN
Status: DISCONTINUED | OUTPATIENT
Start: 2025-06-27 | End: 2025-06-27 | Stop reason: HOSPADM

## 2025-06-24 RX ORDER — DIPHENHYDRAMINE HYDROCHLORIDE 50 MG/ML
12.5 INJECTION, SOLUTION INTRAMUSCULAR; INTRAVENOUS
Status: DISCONTINUED | OUTPATIENT
Start: 2025-06-24 | End: 2025-06-24 | Stop reason: HOSPADM

## 2025-06-24 RX ORDER — HYDROMORPHONE HYDROCHLORIDE 1 MG/ML
0.1 INJECTION, SOLUTION INTRAMUSCULAR; INTRAVENOUS; SUBCUTANEOUS
Status: DISCONTINUED | OUTPATIENT
Start: 2025-06-24 | End: 2025-06-24 | Stop reason: HOSPADM

## 2025-06-24 RX ORDER — OXYCODONE HYDROCHLORIDE 10 MG/1
10 TABLET ORAL
Refills: 0 | Status: DISCONTINUED | OUTPATIENT
Start: 2025-06-24 | End: 2025-06-27 | Stop reason: HOSPADM

## 2025-06-24 RX ORDER — SODIUM CHLORIDE, SODIUM LACTATE, POTASSIUM CHLORIDE, CALCIUM CHLORIDE 600; 310; 30; 20 MG/100ML; MG/100ML; MG/100ML; MG/100ML
INJECTION, SOLUTION INTRAVENOUS
Status: DISCONTINUED | OUTPATIENT
Start: 2025-06-24 | End: 2025-06-24 | Stop reason: SURG

## 2025-06-24 RX ORDER — EPHEDRINE SULFATE 50 MG/ML
5 INJECTION, SOLUTION INTRAVENOUS
Status: DISCONTINUED | OUTPATIENT
Start: 2025-06-24 | End: 2025-06-24 | Stop reason: HOSPADM

## 2025-06-24 RX ORDER — ALBUTEROL SULFATE 5 MG/ML
2.5 SOLUTION RESPIRATORY (INHALATION)
Status: DISCONTINUED | OUTPATIENT
Start: 2025-06-24 | End: 2025-06-24 | Stop reason: HOSPADM

## 2025-06-24 RX ORDER — OXYCODONE HYDROCHLORIDE 5 MG/1
5 TABLET ORAL
Refills: 0 | Status: DISCONTINUED | OUTPATIENT
Start: 2025-06-24 | End: 2025-06-27 | Stop reason: HOSPADM

## 2025-06-24 RX ORDER — HYDROMORPHONE HYDROCHLORIDE 1 MG/ML
0.2 INJECTION, SOLUTION INTRAMUSCULAR; INTRAVENOUS; SUBCUTANEOUS
Status: DISCONTINUED | OUTPATIENT
Start: 2025-06-24 | End: 2025-06-24 | Stop reason: HOSPADM

## 2025-06-24 RX ORDER — HYDROMORPHONE HYDROCHLORIDE 1 MG/ML
0.4 INJECTION, SOLUTION INTRAMUSCULAR; INTRAVENOUS; SUBCUTANEOUS
Status: DISCONTINUED | OUTPATIENT
Start: 2025-06-24 | End: 2025-06-24 | Stop reason: HOSPADM

## 2025-06-24 RX ORDER — ACETAMINOPHEN 325 MG/1
650 TABLET ORAL EVERY 6 HOURS PRN
Status: DISCONTINUED | OUTPATIENT
Start: 2025-06-24 | End: 2025-06-24

## 2025-06-24 RX ORDER — MIDAZOLAM HYDROCHLORIDE 1 MG/ML
INJECTION INTRAMUSCULAR; INTRAVENOUS PRN
Status: DISCONTINUED | OUTPATIENT
Start: 2025-06-24 | End: 2025-06-24 | Stop reason: SURG

## 2025-06-24 RX ORDER — SODIUM CHLORIDE, SODIUM LACTATE, POTASSIUM CHLORIDE, CALCIUM CHLORIDE 600; 310; 30; 20 MG/100ML; MG/100ML; MG/100ML; MG/100ML
INJECTION, SOLUTION INTRAVENOUS CONTINUOUS
Status: DISCONTINUED | OUTPATIENT
Start: 2025-06-24 | End: 2025-06-24 | Stop reason: HOSPADM

## 2025-06-24 RX ORDER — KETOROLAC TROMETHAMINE 15 MG/ML
INJECTION, SOLUTION INTRAMUSCULAR; INTRAVENOUS PRN
Status: DISCONTINUED | OUTPATIENT
Start: 2025-06-24 | End: 2025-06-24 | Stop reason: SURG

## 2025-06-24 RX ORDER — ACETAMINOPHEN 500 MG
1000 TABLET ORAL EVERY 6 HOURS
Status: DISCONTINUED | OUTPATIENT
Start: 2025-06-24 | End: 2025-06-27 | Stop reason: HOSPADM

## 2025-06-24 RX ORDER — ONDANSETRON 2 MG/ML
4 INJECTION INTRAMUSCULAR; INTRAVENOUS
Status: DISCONTINUED | OUTPATIENT
Start: 2025-06-24 | End: 2025-06-24 | Stop reason: HOSPADM

## 2025-06-24 RX ADMIN — OXYCODONE HYDROCHLORIDE 10 MG: 10 TABLET ORAL at 13:48

## 2025-06-24 RX ADMIN — FENTANYL CITRATE 50 MCG: 50 INJECTION, SOLUTION INTRAMUSCULAR; INTRAVENOUS at 17:14

## 2025-06-24 RX ADMIN — HYDROMORPHONE HYDROCHLORIDE 0.2 MG: 1 INJECTION, SOLUTION INTRAMUSCULAR; INTRAVENOUS; SUBCUTANEOUS at 18:13

## 2025-06-24 RX ADMIN — OXYCODONE 5 MG: 5 TABLET ORAL at 06:51

## 2025-06-24 RX ADMIN — OXYCODONE 5 MG: 5 TABLET ORAL at 01:17

## 2025-06-24 RX ADMIN — FENTANYL CITRATE 25 MCG: 50 INJECTION, SOLUTION INTRAMUSCULAR; INTRAVENOUS at 17:28

## 2025-06-24 RX ADMIN — Medication 100 MG: at 19:32

## 2025-06-24 RX ADMIN — FENTANYL CITRATE 50 MCG: 50 INJECTION, SOLUTION INTRAMUSCULAR; INTRAVENOUS at 15:55

## 2025-06-24 RX ADMIN — SODIUM CHLORIDE: 9 INJECTION, SOLUTION INTRAVENOUS at 09:23

## 2025-06-24 RX ADMIN — KETOROLAC TROMETHAMINE 15 MG: 15 INJECTION, SOLUTION INTRAMUSCULAR; INTRAVENOUS at 23:15

## 2025-06-24 RX ADMIN — HYDROMORPHONE HYDROCHLORIDE 0.4 MG: 1 INJECTION, SOLUTION INTRAMUSCULAR; INTRAVENOUS; SUBCUTANEOUS at 18:05

## 2025-06-24 RX ADMIN — ACETAMINOPHEN 1000 MG: 500 TABLET ORAL at 11:45

## 2025-06-24 RX ADMIN — OXYCODONE HYDROCHLORIDE 10 MG: 5 SOLUTION ORAL at 17:59

## 2025-06-24 RX ADMIN — ONDANSETRON 4 MG: 2 INJECTION INTRAMUSCULAR; INTRAVENOUS at 17:14

## 2025-06-24 RX ADMIN — OXYCODONE HYDROCHLORIDE 10 MG: 10 TABLET ORAL at 19:31

## 2025-06-24 RX ADMIN — CLINDAMYCIN PHOSPHATE 600 MG: 150 INJECTION, SOLUTION INTRAMUSCULAR; INTRAVENOUS at 16:00

## 2025-06-24 RX ADMIN — ACETAMINOPHEN 1000 MG: 500 TABLET ORAL at 23:15

## 2025-06-24 RX ADMIN — FOLIC ACID 1 MG: 1 TABLET ORAL at 19:32

## 2025-06-24 RX ADMIN — OMEPRAZOLE 40 MG: 20 CAPSULE, DELAYED RELEASE ORAL at 05:20

## 2025-06-24 RX ADMIN — SODIUM CHLORIDE, POTASSIUM CHLORIDE, SODIUM LACTATE AND CALCIUM CHLORIDE: 600; 310; 30; 20 INJECTION, SOLUTION INTRAVENOUS at 15:55

## 2025-06-24 RX ADMIN — HYDROMORPHONE HYDROCHLORIDE 0.4 MG: 1 INJECTION, SOLUTION INTRAMUSCULAR; INTRAVENOUS; SUBCUTANEOUS at 17:59

## 2025-06-24 RX ADMIN — MORPHINE SULFATE 3 MG: 4 INJECTION INTRAVENOUS at 04:47

## 2025-06-24 RX ADMIN — MIDAZOLAM HYDROCHLORIDE 2 MG: 1 INJECTION, SOLUTION INTRAMUSCULAR; INTRAVENOUS at 15:50

## 2025-06-24 RX ADMIN — LIDOCAINE HYDROCHLORIDE 50 MG: 20 INJECTION, SOLUTION EPIDURAL; INFILTRATION; INTRACAUDAL; PERINEURAL at 15:58

## 2025-06-24 RX ADMIN — MORPHINE SULFATE 1 MG: 4 INJECTION INTRAVENOUS at 03:03

## 2025-06-24 RX ADMIN — DEXAMETHASONE SODIUM PHOSPHATE 4 MG: 4 INJECTION INTRA-ARTICULAR; INTRALESIONAL; INTRAMUSCULAR; INTRAVENOUS; SOFT TISSUE at 16:10

## 2025-06-24 RX ADMIN — PROPOFOL 150 MG: 10 INJECTION, EMULSION INTRAVENOUS at 15:58

## 2025-06-24 RX ADMIN — HYDROMORPHONE HYDROCHLORIDE 0.5 MG: 1 INJECTION, SOLUTION INTRAMUSCULAR; INTRAVENOUS; SUBCUTANEOUS at 21:57

## 2025-06-24 RX ADMIN — THERA TABS 1 TABLET: TAB at 19:32

## 2025-06-24 RX ADMIN — GABAPENTIN 100 MG: 100 CAPSULE ORAL at 11:45

## 2025-06-24 RX ADMIN — KETOROLAC TROMETHAMINE 15 MG: 15 INJECTION, SOLUTION INTRAMUSCULAR; INTRAVENOUS at 11:46

## 2025-06-24 RX ADMIN — SCOPOLAMINE 1 PATCH: 1.5 PATCH, EXTENDED RELEASE TRANSDERMAL at 14:50

## 2025-06-24 RX ADMIN — OXYCODONE HYDROCHLORIDE 10 MG: 10 TABLET ORAL at 10:30

## 2025-06-24 RX ADMIN — KETOROLAC TROMETHAMINE 15 MG: 15 INJECTION, SOLUTION INTRAMUSCULAR; INTRAVENOUS at 17:14

## 2025-06-24 RX ADMIN — GABAPENTIN 100 MG: 100 CAPSULE ORAL at 04:47

## 2025-06-24 ASSESSMENT — PAIN DESCRIPTION - PAIN TYPE
TYPE: SURGICAL PAIN
TYPE: ACUTE PAIN
TYPE: ACUTE PAIN
TYPE: SURGICAL PAIN
TYPE: ACUTE PAIN
TYPE: SURGICAL PAIN
TYPE: SURGICAL PAIN
TYPE: ACUTE PAIN
TYPE: SURGICAL PAIN
TYPE: ACUTE PAIN
TYPE: ACUTE PAIN

## 2025-06-24 ASSESSMENT — LIFESTYLE VARIABLES
ANXIETY: NO ANXIETY (AT EASE)
VISUAL DISTURBANCES: NOT PRESENT
HEADACHE, FULLNESS IN HEAD: NOT PRESENT
NAUSEA AND VOMITING: NO NAUSEA AND NO VOMITING
ORIENTATION AND CLOUDING OF SENSORIUM: ORIENTED AND CAN DO SERIAL ADDITIONS
NAUSEA AND VOMITING: NO NAUSEA AND NO VOMITING
AGITATION: NORMAL ACTIVITY
PAROXYSMAL SWEATS: NO SWEAT VISIBLE
AGITATION: NORMAL ACTIVITY
TOTAL SCORE: 4
TOTAL SCORE: 4
ORIENTATION AND CLOUDING OF SENSORIUM: ORIENTED AND CAN DO SERIAL ADDITIONS
VISUAL DISTURBANCES: NOT PRESENT
TOTAL SCORE: 4
HEADACHE, FULLNESS IN HEAD: NOT PRESENT
ANXIETY: NO ANXIETY (AT EASE)
AGITATION: NORMAL ACTIVITY
AUDITORY DISTURBANCES: NOT PRESENT
AUDITORY DISTURBANCES: NOT PRESENT
TOTAL SCORE: 4
NAUSEA AND VOMITING: NO NAUSEA AND NO VOMITING
TOTAL SCORE: 3
ORIENTATION AND CLOUDING OF SENSORIUM: ORIENTED AND CAN DO SERIAL ADDITIONS
ANXIETY: NO ANXIETY (AT EASE)
ANXIETY: NO ANXIETY (AT EASE)
TOTAL SCORE: 4
ANXIETY: NO ANXIETY (AT EASE)
HEADACHE, FULLNESS IN HEAD: NOT PRESENT
AGITATION: NORMAL ACTIVITY
VISUAL DISTURBANCES: NOT PRESENT
NAUSEA AND VOMITING: NO NAUSEA AND NO VOMITING
TREMOR: *
PAROXYSMAL SWEATS: NO SWEAT VISIBLE
TREMOR: MODERATE TREMOR WITH ARMS EXTENDED
HEADACHE, FULLNESS IN HEAD: NOT PRESENT
VISUAL DISTURBANCES: NOT PRESENT
AUDITORY DISTURBANCES: NOT PRESENT
PAROXYSMAL SWEATS: NO SWEAT VISIBLE
HEADACHE, FULLNESS IN HEAD: NOT PRESENT
AUDITORY DISTURBANCES: NOT PRESENT
PAROXYSMAL SWEATS: NO SWEAT VISIBLE
TREMOR: MODERATE TREMOR WITH ARMS EXTENDED
NAUSEA AND VOMITING: NO NAUSEA AND NO VOMITING
TREMOR: MODERATE TREMOR WITH ARMS EXTENDED
PAROXYSMAL SWEATS: NO SWEAT VISIBLE
AUDITORY DISTURBANCES: NOT PRESENT
ORIENTATION AND CLOUDING OF SENSORIUM: ORIENTED AND CAN DO SERIAL ADDITIONS
VISUAL DISTURBANCES: NOT PRESENT
HEADACHE, FULLNESS IN HEAD: NOT PRESENT
AGITATION: NORMAL ACTIVITY
VISUAL DISTURBANCES: NOT PRESENT
NAUSEA AND VOMITING: NO NAUSEA AND NO VOMITING
TREMOR: MODERATE TREMOR WITH ARMS EXTENDED
AGITATION: NORMAL ACTIVITY
AUDITORY DISTURBANCES: NOT PRESENT
ORIENTATION AND CLOUDING OF SENSORIUM: ORIENTED AND CAN DO SERIAL ADDITIONS
PAROXYSMAL SWEATS: NO SWEAT VISIBLE
ORIENTATION AND CLOUDING OF SENSORIUM: ORIENTED AND CAN DO SERIAL ADDITIONS
ANXIETY: NO ANXIETY (AT EASE)
TREMOR: MODERATE TREMOR WITH ARMS EXTENDED

## 2025-06-24 ASSESSMENT — PAIN SCALES - GENERAL: PAIN_LEVEL: 2

## 2025-06-24 NOTE — CARE PLAN
The patient is Stable - Low risk of patient condition declining or worsening    Shift Goals  Clinical Goals: pain control, surgery, NPO  Patient Goals: pain control, rest  Family Goals: not present    Progress made toward(s) clinical / shift goals:    Problem: Pain - Standard  Goal: Alleviation of pain or a reduction in pain to the patient’s comfort goal  Outcome: Progressing     Problem: Knowledge Deficit - Standard  Goal: Patient and family/care givers will demonstrate understanding of plan of care, disease process/condition, diagnostic tests and medications  Outcome: Progressing     NPO for surgery today this afternoon. PT/OT in place    Problem: Optimal Care for Alcohol Withdrawal  Goal: Optimal Care for the alcohol withdrawal patient  Outcome: Progressing     Problem: Seizure Precautions  Goal: Implementation of seizure precautions  Outcome: Progressing     Problem: Lifestyle Changes  Goal: Patient's ability to identify lifestyle changes and available resources to help reduce recurrence of condition will improve  Outcome: Progressing     Problem: Psychosocial  Goal: Patient's level of anxiety will decrease  Outcome: Progressing  Goal: Spiritual and cultural needs incorporated into hospitalization  Outcome: Progressing     Problem: Risk for Aspiration  Goal: Patient's risk for aspiration will be absent or decrease  Outcome: Progressing     Problem: Fall Risk  Goal: Patient will remain free from falls  Outcome: Progressing       Patient is not progressing towards the following goals:

## 2025-06-24 NOTE — PROGRESS NOTES
Ophthalmology referral to Dr. Terry entered in epic .  Please mail referral to patient   Virtual Nurse rounding complete.    Round Needs: No needs at this time.

## 2025-06-24 NOTE — CONSULTS
Patient seen by my partner.  29-year-old female fall onto leg.  Initial workup revealed left tibial shaft fracture.  Plan for fixation today with intramedullary nail.  Risk and benefits discussed at length.      Rashaun Mcdaniel MD  Orthopedic Trauma Surgery

## 2025-06-24 NOTE — PROGRESS NOTES
"ED Observation Progress Note    Date of Service: 06/23/25    Interval History and Interventions  Patient was cared for by my partner prior to my arrival.  Plan was for her hopefully to be discharged home for outpatient ORIF of her ankle fracture.  However the patient is having difficulty ambulating with crutches.  Worked with case management to see if we were able to obtain a wheelchair but unfortunately this did not seem to be possible.  Patient has minimal family support.   is working during the day.  Overall I do not feel the patient can be safely discharged and therefore will be admitted.  I spoke with the orthopedic surgeons.  Dr. Rashaun Mcdaniel from orthopedics will consult for ORIF tomorrow.  In addition I spoke with Dr. Perez who will admit.    Physical Exam  BP (!) 137/95   Pulse 100   Temp (!) 35.7 °C (96.3 °F) (Temporal)   Resp 15   Ht 1.651 m (5' 5\")   Wt 75.3 kg (166 lb)   LMP  (LMP Unknown)   SpO2 93%   BMI 27.62 kg/m² .    Constitutional: Awake and alert. Nontoxic  HENT:  Grossly normal  Eyes: Grossly normal  Neck: Normal range of motion  Cardiovascular: Normal heart rate   Thorax & Lungs: No respiratory distress  Abdomen: Nontender  Skin:  No pathologic rash.   Extremities: Well perfused.  Left lower extremity has a short leg splint  Psychiatric: Affect normal    Labs  Results for orders placed or performed during the hospital encounter of 04/04/25   CBC WITH DIFFERENTIAL    Collection Time: 04/04/25  4:25 AM   Result Value Ref Range    WBC 7.7 4.8 - 10.8 K/uL    RBC 4.35 4.20 - 5.40 M/uL    Hemoglobin 13.9 12.0 - 16.0 g/dL    Hematocrit 42.2 37.0 - 47.0 %    MCV 97.0 81.4 - 97.8 fL    MCH 32.0 27.0 - 33.0 pg    MCHC 32.9 32.2 - 35.5 g/dL    RDW 51.8 (H) 35.9 - 50.0 fL    Platelet Count 299 164 - 446 K/uL    MPV 10.0 9.0 - 12.9 fL    Neutrophils-Polys 43.10 (L) 44.00 - 72.00 %    Lymphocytes 45.50 (H) 22.00 - 41.00 %    Monocytes 7.50 0.00 - 13.40 %    Eosinophils 2.70 0.00 - 6.90 % "    Basophils 0.90 0.00 - 1.80 %    Immature Granulocytes 0.30 0.00 - 0.90 %    Nucleated RBC 0.00 0.00 - 0.20 /100 WBC    Neutrophils (Absolute) 3.32 1.82 - 7.42 K/uL    Lymphs (Absolute) 3.50 1.00 - 4.80 K/uL    Monos (Absolute) 0.58 0.00 - 0.85 K/uL    Eos (Absolute) 0.21 0.00 - 0.51 K/uL    Baso (Absolute) 0.07 0.00 - 0.12 K/uL    Immature Granulocytes (abs) 0.02 0.00 - 0.11 K/uL    NRBC (Absolute) 0.00 K/uL   BASIC METABOLIC PANEL    Collection Time: 25  4:25 AM   Result Value Ref Range    Sodium 138 135 - 145 mmol/L    Potassium 4.0 3.6 - 5.5 mmol/L    Chloride 105 96 - 112 mmol/L    Co2 20 20 - 33 mmol/L    Glucose 99 65 - 99 mg/dL    Bun 5 (L) 8 - 22 mg/dL    Creatinine 0.56 0.50 - 1.40 mg/dL    Calcium 8.5 8.5 - 10.5 mg/dL    Anion Gap 13.0 7.0 - 16.0   HCG Qual Serum    Collection Time: 25  4:25 AM   Result Value Ref Range    Beta-Hcg Qualitative Serum Negative Negative   DIAGNOSTIC ALCOHOL    Collection Time: 25  4:25 AM   Result Value Ref Range    Diagnostic Alcohol 205.0 (H) <10.1 mg/dL   ESTIMATED GFR    Collection Time: 25  4:25 AM   Result Value Ref Range    GFR (CKD-EPI) 125 >60 mL/min/1.73 m 2   ASPARTATE AMINO-[facility]    Collection Time: 25  4:25 AM   Result Value Ref Range    AST(SGOT) 44 12 - 45 U/L   ALANINE AMINO-TRANS    Collection Time: 25  4:25 AM   Result Value Ref Range    ALT(SGPT) 30 2 - 50 U/L   POC BREATHALIZER    Collection Time: 25  4:38 AM   Result Value Ref Range    POC Breathalizer 0.157 (A) 0.00 - 0.01 Percent   EKG    Collection Time: 25  5:15 AM   Result Value Ref Range    Report       [facility]    Test Date:  2025  Pt Name:    SHASTA RUIZ       Department:   MRN:        [id number]                      Room:        21  Gender:     Female                       Technician: [id number]  :        1995                   Requested By:MALACHI BARNES  Order #:    [id number]                     Reading MD: Inderjit Breaux    Measurements  Intervals                                Axis  Rate:       61                           P:          33  NY:         138                          QRS:        35  QRSD:       92                           T:          47  QT:         442  QTc:        446    Interpretive Statements  Sinus rhythm  No previous ECG available for comparison  Electronically Signed On 04- 05:15:06 PDT by Inderjit Breaux     ACETAMINOPHEN    Collection Time: 04/04/25  5:36 AM   Result Value Ref Range    Acetaminophen -Tylenol <5.0 (L) 10.0 - 30.0 ug/mL   Salicylate    Collection Time: 04/04/25  5:36 AM   Result Value Ref Range    Salicylates, Quant. <1.0 (L) 15.0 - 25.0 mg/dL   POC BREATHALIZER    Collection Time: 04/04/25  9:17 AM   Result Value Ref Range    POC Breathalizer 0.091 (A) 0.00 - 0.01 Percent   Urine Drug Screen    Collection Time: 04/04/25 12:12 PM   Result Value Ref Range    Amphetamines Urine Negative Negative    Barbiturates Negative Negative    Benzodiazepines Negative Negative    Cocaine Metabolite Negative Negative    Fentanyl, Urine Negative Negative    Methadone Negative Negative    Opiates Negative Negative    Oxycodone Negative Negative    Phencyclidine -Pcp Negative Negative    Propoxyphene Negative Negative    Cannabinoid Metab Negative Negative       Radiology  DX-ANKLE 3+ VIEWS LEFT   Final Result      Comminuted intra-articular fracture of the distal left tibia with regional soft tissue swelling.      DX-TIBIA AND FIBULA LEFT   Final Result      Acute closed nondisplaced intra-articular spiral fracture of the distal left tibia. Regional soft tissue swelling.      [id number] W/O PLUS RECONS LEFT    (Results Pending)       Problem List  1. Closed displaced comminuted fracture of shaft of left tibia, initial encounter          Electronically signed by: Lupillo Barriga M.D., 6/23/2025 6:23 PM

## 2025-06-24 NOTE — OP REPORT
DATE OF OPERATION: 6/24/2025     PREOPERATIVE DIAGNOSIS:   1.  Left intra-articular distal tibia pilon fracture  2.  Left tibial shaft spiral fracture    POSTOPERATIVE DIAGNOSIS: Same    PROCEDURE PERFORMED:    1.  Open reduction internal fixation left distal tibia pilon fracture with 4.0 cannulated screws  2.  Fixation left tibial shaft fracture with intramedullary nail    SURGEON: Rashaun Mcdaniel M.D.     ASSISTANT: Rodrigo Tolbert MD - ortho trauma fellow  The use of the fellow as a surgical assistant was necessary for assistance with exposure, retraction, fracture reduction, instrumentation, and closure.      ANESTHESIA: General    SPECIMEN: None    ESTIMATED BLOOD LOSS: 50 mL    IMPLANTS: Nekoma tibial nail, Nekoma 4.0 Marleny cannulated screws x 2      INDICATIONS: The patient is a 29 y.o. female who presented with above.  I discussed the risks and benefits of the procedure which include but are not limited to risks of infection, wound healing complication, neurovascular injury, pain, malunion, non-union, malrotation, and the medical risks of anesthesia including MI, stroke, and death.  Alternatives to surgery were also discussed, including non-operative management, which I did not recommend.  The patient was in agreement with the plan to proceed, and the informed consent was signed and documented.  I met with the patient pre-operatively and marked the operative extremity with their agreement.  We proceeded to the operating room.     DESCRIPTION OF PROCEDURE:  Patient was seen in the preoperative holding area on the day of surgery. The operative site was marked with my initials.  she was taken to the operating room and placed supine on the operative table.  Anesthesia was induced.  The operative extremity was prepped and draped in the normal sterile fashion.  Operative pause was conducted and the correct patient, site, side, procedure, and surgeon's initials on extremity were identified.  We began by  clamping the articular surface of the distal tibia.  Small incision was performed over the anterior lateral distal tibia.  The joint surface was compressed using pointed reduction clamp.  We then placed our guidewires for our 4.0 Amaya cannula screws from anterior lateral posterior medial based on her preoperative CT scan.  AP and lateral views ensured proper placement these were measured overdrilled and placed on hand.  Compression was obtained.  Next we placed a percutaneous clamp proximally along the tibial shaft reducing the tibial shaft component.  Once we had near-anatomic reduction we then performed our suprapatellar incision.  The tendon was split in line with skin incision.  We then placed our starting wire into position checked in AP and lateral views.  This would advance when appropriate.  We then used into reamer to gain entry into canal.  Ball-tipped guide was then placed distal to the ankle joint.  This was measured.  We then sequentially reamed up to appropriate size reamer.  We then placed our nail in position.  Prior to final placement the nail we removed the guidewire to allow the nail to find the appropriate trajectory.  Once appropriate position we then drilled and placed multiple distal interlock screws using perfect [place] technique as well as single proximal interlock screw through the jig.  Final images were obtained showing near anatomic reduction and appropriate implant position.  Wounds were irrigated sterile saline closed in layered fashion.  Sterile dressings applied she was awoken taken to PACU in stable condition.    POSTOPERATIVE PLAN: Toe-touch weightbearing left lower extremity with motion as tolerated weight bearing.  Mobilize with physical and occupational therapies.  DVT prophylaxis with SCDs and Lovenox until mobilizing independently and then can be switched to aspirin for 4 weeks.  The patient will follow up in clinic in 2 weeks to check wounds and remove  kim/staples.      ____________________________________   Rashaun Mcdaniel M.D.   DD: 6/24/2025  4:58 PM

## 2025-06-24 NOTE — THERAPY
Physical Therapy Contact Note    Patient Name: Linda Carpenter  Age:  29 y.o., Sex:  female  Medical Record #: [id number]  Today's Date: 6/24/2025 06/24/25 0650   Initial Contact Note    Initial Contact Note Order Received and Verified, Physical Therapy Evaluation in Progress with Full Report to Follow.   Interdisciplinary Plan of Care Collaboration   Collaboration Comments PT order received. Pt is scheduled for sx today.  PT will follow up post-op.   Session Information   Date / Session Number  6/24- sx today  (EVAL)

## 2025-06-24 NOTE — ASSESSMENT & PLAN NOTE
- Due to alcohol dependence.  Patient states she has stopped drinking alcohol daily when she found out that she had cirrhosis.  - Ultrasound showed normal liver contour with no evidence of solid mass lesion.  - Continue to encourage alcohol cessation.

## 2025-06-24 NOTE — ANESTHESIA PROCEDURE NOTES
Airway    Date/Time: 6/24/2025 3:59 PM    Performed by: Joey Kenney M.D.  Authorized by: Joey Kenney M.D.    Location:  OR  Urgency:  Elective  Indications for Airway Management:  Anesthesia      Spontaneous Ventilation: absent    Sedation Level:  Deep  Preoxygenated: Yes    Final Airway Type:  Supraglottic airway  Final Supraglottic Airway:  Standard LMA    SGA Size:  5  Number of Attempts at Approach:  1

## 2025-06-24 NOTE — PROGRESS NOTES
Hospital Medicine Daily Progress Note    Date of Service  6/24/2025    Chief Complaint  Ankle injury    Hospital Course  Linda Carpenter is a 29 y.o. female with alcoholism, cirrhosis, and GERD, admitted 6/23/2025 with fall while camping with friends.  She tripped and fell without any loss of consciousness, dizziness, or palpitations.  On evaluation, x-ray showed close left tibia fracture.  Blood pressure was elevated.  Labs showed WBC of 13,500.  Electrolytes and renal function were normal.  AST was 47, with normal bilirubin.  Patient was placed on analgesics.  Orthopedics was consulted who recommended surgical repair.  Patient admitted to drinking heavily prior to admission, and was noted to have tremors and anxiety on admission.  She was placed on CIWA protocol.    Interval Problem Update  6/24/2025 - I reviewed the patient's chart. There were no significant overnight events. Remains hemodynamically stable and afebrile. Stable on RA.  CIWA score 4.  Plan for ORIF with IM danilo this afternoon.  WBC count has normalized.  Hemoglobin 11.0.  Platelet count is normal. Ultrasound showed normal gallbladder and right upper quadrant ultrasound with normal liver contour with no evidence of solid mass lesion (personally reviewed).    > I have personally seen and examined the patient today.  She rates her pain at 10 out of 10, hurts to move.  Feeling tremulous.  States she only drinks occasionally, and has stopped drinking daily when she found out that she had cirrhosis.  Denies any nausea or vomiting.  Last bowel movement was 2 days ago.  Passing gas.  Not short of breath.  No chest pain.    I personally reviewed all lab results mentioned above. Prior medical records from this institution and outside facilities were independently reviewed as noted. I also personally reviewed all ER physician and consultant recommendations and plans as documented above. History was independently obtained by myself. I have discussed this  patient's plan of care and discharge plan at IDT rounds today with Case Management, Nursing, Nursing leadership, and other members of the IDT team.    Consultants/Specialty  orthopedics    Code Status  Full Code    Disposition  The patient is not medically cleared for discharge to home or a post-acute facility.      Discharge plan TBD.  PT/OT evaluation postoperatively.    I have placed the appropriate orders for post-discharge needs.    Review of Systems  ROS     Pertinent positives/negatives as mentioned above.     A complete review of systems was personally done by me. All other systems were negative.       Physical Exam  Temp:  [36.8 °C (98.2 °F)] 36.8 °C (98.2 °F)  Pulse:  [] 86  Resp:  [15-20] 16  BP: (103-141)/(74-95) 109/74  SpO2:  [90 %-95 %] 90 %    Physical Exam  Vitals reviewed.   Constitutional:       General: She is not in acute distress.     Appearance: Normal appearance. She is normal weight. She is not ill-appearing or diaphoretic.   HENT:      Head: Normocephalic and atraumatic.      Right Ear: External ear normal.      Left Ear: External ear normal.      Mouth/Throat:      Mouth: Mucous membranes are moist.      Pharynx: No oropharyngeal exudate or posterior oropharyngeal erythema.   Eyes:      General: No scleral icterus.     Extraocular Movements: Extraocular movements intact.      Conjunctiva/sclera: Conjunctivae normal.      Pupils: Pupils are equal, round, and reactive to light.   Cardiovascular:      Rate and Rhythm: Normal rate and regular rhythm.      Heart sounds: Normal heart sounds. No murmur heard.  Pulmonary:      Effort: Pulmonary effort is normal. No respiratory distress.      Breath sounds: Normal breath sounds. No stridor. No wheezing, rhonchi or rales.   Chest:      Chest wall: No tenderness.   Abdominal:      General: Bowel sounds are normal. There is no distension.      Palpations: Abdomen is soft. There is no mass.      Tenderness: There is no abdominal tenderness. There  is no guarding or rebound.   Musculoskeletal:         General: Tenderness (Left ankle) present. No swelling. Normal range of motion.      Cervical back: Normal range of motion and neck supple. No rigidity. No muscular tenderness.      Comments: Tremulous   Lymphadenopathy:      Cervical: No cervical adenopathy.   Skin:     General: Skin is warm and dry.      Coloration: Skin is not jaundiced.      Findings: No rash.   Neurological:      General: No focal deficit present.      Mental Status: She is alert and oriented to person, place, and time. Mental status is at baseline.      Cranial Nerves: No cranial nerve deficit.   Psychiatric:         Mood and Affect: Mood normal.         Behavior: Behavior normal.         Thought Content: Thought content normal.         Judgment: Judgment normal.         Fluids    Intake/Output Summary (Last 24 hours) at 6/24/2025 1326  Last data filed at 6/24/2025 0238  Gross per 24 hour   Intake 1458.23 ml   Output --   Net 1458.23 ml        Laboratory  Recent Labs     06/23/25  1412 06/24/25  0630   WBC 13.5* 8.4   RBC 3.51* 3.03*   HEMOGLOBIN 12.8 11.0*   HEMATOCRIT 38.2 33.2*   .8* 109.6*   MCH 36.5* 36.3*   MCHC 33.5 33.1   RDW 78.3* 78.2*   PLATELETCT 485* 335   MPV 10.3 9.9     Recent Labs     06/23/25  1412   SODIUM 137   POTASSIUM 3.9   CHLORIDE 99   CO2 20   GLUCOSE 102*   BUN 13   CREATININE 0.85   CALCIUM 9.1     Recent Labs     06/23/25  1412   APTT 25.5   INR 1.13               Imaging  US-RUQ   Final Result         1. Normal gallbladder and right upper quadrant ultrasound.      [id number] W/O PLUS RECONS LEFT   Final Result      1.  Mildly displaced, comminuted, intra-articular fracture of the distal tibia extending to the distal tibial articular surface.   2.  Small ankle joint effusion.      DX-ANKLE 3+ VIEWS LEFT   Final Result      Comminuted intra-articular fracture of the distal left tibia with regional soft tissue swelling.      DX-TIBIA AND FIBULA LEFT    Final Result      Acute closed nondisplaced intra-articular spiral fracture of the distal left tibia. Regional soft tissue swelling.      DX-PORTABLE FLUORO > 1 HOUR    (Results Pending)   DX-TIBIA AND FIBULA LEFT    (Results Pending)        Assessment/Plan  * Fracture of left tibia  Assessment & Plan  - Due to mechanical fall.  - Orthopedic on board, plan for ORIF with IM danilo this afternoon 6/24/25.  - Check Vit D level.   - continue pain control.  Start scheduled Tylenol and IV Toradol, along with oral oxycodone and IV Dilaudid. Monitor for narcotic side effects particularly respiratory depression, AMS, and constipation.    -Monitor for ABLA postoperatively.  Check hemoglobin daily.  - Continue pharmacologic DVT prophylaxis while in the hospital.  Continue Lovenox SQ.   - PT/OT eval postoperatively.      Alcohol withdrawal syndrome without complication (HCC)- (present on admission)  Assessment & Plan  - History of drinking alcohol for years and history of cirrhosis.  States she is not drinking daily anymore.  - CIWA 4.   - Continue as needed Ativan per CIWA protocol.  - Continue thiamine, folate and multivitamin supplementation.  - Continue to monitor withdrawal symptoms.  If worsening, escalate to higher level of care.    Macrocytic anemia- (present on admission)  Assessment & Plan  - Due to cirrhosis, alcohol dependence.  - Restrictive transfusion strategy.  Transfuse if hemoglobin drops below 7.  CBC in the morning.    Gastroesophageal reflux disease- (present on admission)  Assessment & Plan  -Continue pantoprazole    Cirrhosis (HCC)- (present on admission)  Assessment & Plan  - Due to alcohol dependence.  Patient states she has stopped drinking alcohol daily when she found out that she had cirrhosis.  - Ultrasound showed normal liver contour with no evidence of solid mass lesion.  - Continue to encourage alcohol cessation.         VTE prophylaxis: Lovenox SQ

## 2025-06-24 NOTE — CARE PLAN
The patient is     Shift Goals  Clinical Goals: safety, pain management, CT scan, RUQ US  Patient Goals: pain control, rest, surgery  Family Goals: GLORY    Progress made toward(s) clinical / shift goals:  Pt medicated per MAR for pain, educated on the different modalities of pain management. CIWA screening done per flowsheets. CT scan completed. RUQ US needs to be complete, pt NPO for scan. Fall, seizure, and aspiration precautions in place. Updated on care plan.       Problem: Pain - Standard  Goal: Alleviation of pain or a reduction in pain to the patient’s comfort goal  Outcome: Progressing     Problem: Knowledge Deficit - Standard  Goal: Patient and family/care givers will demonstrate understanding of plan of care, disease process/condition, diagnostic tests and medications  Outcome: Progressing     Problem: Optimal Care for Alcohol Withdrawal  Goal: Optimal Care for the alcohol withdrawal patient  Outcome: Progressing

## 2025-06-24 NOTE — PROGRESS NOTES
Approx 2000 Pt arrived to unit via hospital transport. Pt unable to ambulate or lift self off gurney, slide board used to move pt to hospital bed.   CIWA in place. Fall, seizure, and aspiration precautions in place per orders.  Report received from ED to floor RN report. Assessment complete.  A&Ox4. Denies CP/SOB.  Reporting 7/10 pain. Medicated per MAR.   Educated patient regarding pharmacologic and non pharmacologic modalities for pain management.  Skin: LLE ankle with splint and ace wrap  Tolerating regular diet, NPO at midnight. Pt has slight nausea, no vomiting. Medicated per MAR.  + void. Last BM PTA  Pt ambulates with max assist, pt unwilling/unable to ambulate. NWB to LLE. Moderate fall risk per Rosie Holguin, bed alarm on.  All needs met at this time. Call light within reach. Pt calls appropriately. Bed low and locked, non skid socks in place. Hourly rounding in place.

## 2025-06-24 NOTE — ED NOTES
ED tech unwrapped splint for stirrup application. Stirrup already in place PTA. Ace wraps reapplied. CMS intact.

## 2025-06-24 NOTE — ASSESSMENT & PLAN NOTE
- Due to mechanical fall.  - now s/p ORIF of the left distal tibia pilon fracture with 4.0 cannulated screws and fixation of the left tibial shaft fracture with intramedullary nail on 6/24/2025.  - Vitamin D level low.  Continue oral cholecalciferol replacement.  - continue pain control.  Continue scheduled Tylenol and IV Toradol, along with oral oxycodone and IV Dilaudid. Monitor for narcotic side effects particularly respiratory depression, AMS, and constipation.    - Continue pharmacologic DVT prophylaxis while in the hospital.  Continue Lovenox SQ.   - PT/OT recommending postacute placement which she is agreeable to.  PM&R deemed her a candidate for IRF, pending Medicaid insurance authorization for acute rehab placement.  I discussed discharge planning with YOSELIN/JOB.

## 2025-06-24 NOTE — ANESTHESIA PREPROCEDURE EVALUATION
Case: [id number] Date/Time: 06/24/25 1419    Procedure: INSERTION, INTRAMEDULLARY ZOEY, TIBIA (Left)    Location: [facility] OR  / SURGERY [facility]    Surgeons: Rashaun Mcdaniel M.D.            Relevant Problems   GI   (positive) Gastroesophageal reflux disease         (positive) Cirrhosis (HCC)       Physical Exam    Airway   Mallampati: II  TM distance: >3 FB  Neck ROM: full       Cardiovascular    Dental    Pulmonary    Abdominal    Neurological                Anesthesia Plan    ASA 3   ASA physical status 3 criteria: alcohol and/or substance dependence or abuse    Plan - general       Airway plan will be LMA          Induction: intravenous    Postoperative Plan: Postoperative administration of opioids is intended.    Pertinent diagnostic labs and testing reviewed    Informed Consent:    Anesthetic plan and risks discussed with patient.    Use of blood products discussed with: patient whom consented to blood products.

## 2025-06-24 NOTE — THERAPY
Occupational Therapy Contact Note    Patient Name: Linda Carpenter  Age:  29 y.o., Sex:  female  Medical Record #: [id number]  Today's Date: 6/24/2025    OT consult rec'd. Pending surgical intervention for L tibia fx. Will hold OT eval and re-attempt as appropriate/able post op.

## 2025-06-24 NOTE — PROGRESS NOTES
"Pt declines full skin check at this time due to pain. Medicated per MAR for pain and education provided, pt still wishes to refuse and states \"my skin is fine\".   "

## 2025-06-24 NOTE — ASSESSMENT & PLAN NOTE
- Due to cirrhosis, alcohol dependence.  - Restrictive transfusion strategy.  Transfuse if hemoglobin drops below 7.

## 2025-06-24 NOTE — H&P
Hospital Medicine History & Physical Note    Date of Service  6/23/2025    Primary Care Physician  Pcp Not In Computer    Consultants  Orthopedic    Code Status  Full Code    Chief Complaint  Chief Complaint   Patient presents with    Ankle Injury       History of Presenting Illness    29-year-old female with history of alcoholism, cirrhosis, and GERD who presented 6/23 with fall.  Patient was camping with friends when she tripped and fell.  Denied any loss of consciousness, no dizziness or palpitation no chest pain, patient states she is a heavy drinker and last drink few days before admission, also patient states she has cirrhosis, on admission blood pressure was elevated, patient was on room air, labs did not show anemia and AST more than 47, urine drug screen was negative, x-ray showed closed left tibia fracture, Ortho was consulted and planning for surgery tomorrow.    I discussed the plan of care with patient, bedside RN, and ED physician.    Review of Systems  Review of Systems   Constitutional:  Positive for malaise/fatigue. Negative for chills, fever and weight loss.   HENT:  Negative for ear pain, hearing loss and tinnitus.    Eyes:  Negative for blurred vision, double vision and photophobia.   Respiratory:  Negative for cough and hemoptysis.    Cardiovascular:  Negative for chest pain, palpitations, orthopnea and claudication.   Gastrointestinal:  Negative for abdominal pain, constipation, diarrhea, nausea and vomiting.   Genitourinary:  Negative for dysuria, frequency and urgency.   Musculoskeletal:  Negative for myalgias and neck pain.   Skin:  Negative for rash.   Neurological:  Positive for tremors and weakness. Negative for dizziness, tingling and speech change.   Psychiatric/Behavioral:  The patient is nervous/anxious.        Past Medical History   has a past medical history of Anemia (9/4/2024), Coagulopathy (HCC) (9/4/2024), and Gastroesophageal reflux disease (9/4/2024).    Surgical History   has  a past surgical history that includes pr upper gi endoscopy,diagnosis (N/A, 9/4/2024) and pr colonoscopy-flexible (N/A, 9/4/2024).     Family History  Family History   Problem Relation Age of Onset    Other Mother         fribromyalgia     Other Father         kidney failure    Asthma Brother         Family history reviewed with patient. There is no family history that is pertinent to the chief complaint.     Social History   reports that she has quit smoking. Her smoking use included cigarettes. She has a 1.8 pack-year smoking history. She has never used smokeless tobacco. She reports that she does not drink alcohol and does not use drugs.    Allergies  Allergies[1]    Medications  Prior to Admission Medications   Prescriptions Last Dose Informant Patient Reported? Taking?   celecoxib (CELEBREX) 100 MG Cap  Patient's Home Pharmacy Yes No   Sig: Take 100 mg by mouth 2 times a day.   escitalopram (LEXAPRO) 10 MG Tab  Patient's Home Pharmacy Yes No   Sig: Take 10 mg by mouth every day.   gabapentin (NEURONTIN) 100 MG Cap  Patient's Home Pharmacy Yes No   Sig: Take 100 mg by mouth 3 times a day.   pantoprazole (PROTONIX) 40 MG Tablet Delayed Response  Patient's Home Pharmacy No No   Sig: Take 1 Tablet by mouth every day.   propranolol (INDERAL) 10 MG Tab  Patient's Home Pharmacy Yes No   Sig: Take 10 mg by mouth 2 times a day.      Facility-Administered Medications: None       Physical Exam  Temp:  [35.7 °C (96.3 °F)] 35.7 °C (96.3 °F)  Pulse:  [] 92  Resp:  [15-20] 16  BP: (122-143)/() 122/83  SpO2:  [90 %-95 %] 91 %  Blood Pressure: (!) 137/95   Temperature: (!) 35.7 °C (96.3 °F)   Pulse: 100   Respiration: 15   Pulse Oximetry: 93 %       Physical Exam  Constitutional:       General: She is not in acute distress.     Appearance: She is ill-appearing.   Eyes:      General: No scleral icterus.  Cardiovascular:      Rate and Rhythm: Tachycardia present.      Heart sounds: No murmur heard.  Pulmonary:       "Breath sounds: No wheezing.   Abdominal:      General: There is no distension.      Tenderness: There is no abdominal tenderness. There is no guarding.   Musculoskeletal:         General: Tenderness and signs of injury present.      Right lower leg: No edema.      Left lower leg: No edema.      Comments: Breast on the left leg   Skin:     Coloration: Skin is not jaundiced or pale.      Findings: No bruising or lesion.   Neurological:      General: No focal deficit present.      Mental Status: She is oriented to person, place, and time. Mental status is at baseline.      Comments: Tremor         Laboratory:  Recent Labs     06/23/25  1412   WBC 13.5*   RBC 3.51*   HEMOGLOBIN 12.8   HEMATOCRIT 38.2   .8*   MCH 36.5*   MCHC 33.5   RDW 78.3*   PLATELETCT 485*   MPV 10.3     Recent Labs     06/23/25  1412   SODIUM 137   POTASSIUM 3.9   CHLORIDE 99   CO2 20   GLUCOSE 102*   BUN 13   CREATININE 0.85   CALCIUM 9.1     Recent Labs     06/23/25  1412   ALTSGPT 34   ASTSGOT 47*   ALKPHOSPHAT 129*   TBILIRUBIN 1.2   GLUCOSE 102*     Recent Labs     06/23/25  1412   APTT 25.5   INR 1.13     No results for input(s): \"NTPROBNP\" in the last 72 hours.      No results for input(s): \"TROPONINT\" in the last 72 hours.    Imaging:  DX-ANKLE 3+ VIEWS LEFT   Final Result      Comminuted intra-articular fracture of the distal left tibia with regional soft tissue swelling.      DX-TIBIA AND FIBULA LEFT   Final Result      Acute closed nondisplaced intra-articular spiral fracture of the distal left tibia. Regional soft tissue swelling.      [id number] W/O PLUS RECONS LEFT    (Results Pending)   US-RUQ    (Results Pending)       X-Ray:  I have personally reviewed the images and compared with prior images.  EKG:  I have personally reviewed the images and compared with prior images.    Assessment/Plan:  Justification for Admission Status  I anticipate this patient will require at least two midnights for appropriate medical " management, necessitating inpatient admission because alcohol withdrawal and tibia fracture    Patient will need a Med/Surg bed on ORTHOPEDICS service .  The need is secondary to fall and tibia fracture    * Fracture of left tibia  Assessment & Plan  After mechanical fall  Ortho on board and planning for surgery  Pain control and stool softener  DVT prophylaxis  PT and OT after surgery    Alcohol withdrawal syndrome without complication (HCC)- (present on admission)  Assessment & Plan  History of drinking alcohol for years and history of cirrhosis  Patient has tremor and anxiety on admission  [facility] protocol  Multivitamins  Encouraged the patient to quit    Anemia- (present on admission)  Assessment & Plan  Around baseline around 12  Macrocytic anemia  Check TSH and B12    Gastroesophageal reflux disease- (present on admission)  Assessment & Plan  Continue pantoprazole    Cirrhosis (HCC)- (present on admission)  Assessment & Plan  Will order ultrasound to evaluate liver and cirrhosis  Macrocytic anemia  Check alpha-fetoprotein  Encourage the patient to quit drinking  Lactulose as needed        VTE prophylaxis: SCDs/TEDs and enoxaparin ppx         [1]   Allergies  Allergen Reactions    Cefaclor Unspecified     RXN= as baby (increased symptoms)    Sulfamethoxazole W-Trimethoprim Rash     Rash

## 2025-06-24 NOTE — ASSESSMENT & PLAN NOTE
- History of drinking alcohol for years and history of cirrhosis.  States she is not drinking daily anymore.  - Tremors chronic.  - At this point withdrawal less likely  - Continue thiamine, folate and multivitamin supplementation.

## 2025-06-24 NOTE — ED NOTES
Med Rec complete per patient with RX bottles at bedside   Allergies reviewed  Anti-MICROBIAL (antivirals/antibiotics/antifungal) in past 30 days: no  Anticoagulant in past 14 days: no  Dispense history available in EPIC:no  Pharmacy patient utilizes:[facility] in [place]    Patient states she hasn't taken Propranolol in a long while (removed from med rec). States she thinks Spironolactone may have replaced medication because pharmacy stopped filling Propranolol.

## 2025-06-25 PROBLEM — E55.9 VITAMIN D INSUFFICIENCY: Status: ACTIVE | Noted: 2025-06-25

## 2025-06-25 LAB
AFP-TM SERPL-MCNC: 9 NG/ML (ref 0–9)
ALBUMIN SERPL BCP-MCNC: 3.5 G/DL (ref 3.2–4.9)
AMMONIA PLAS-SCNC: 37 UMOL/L (ref 11–45)
ANION GAP SERPL CALC-SCNC: 12 MMOL/L (ref 7–16)
BUN SERPL-MCNC: 9 MG/DL (ref 8–22)
CALCIUM ALBUM COR SERPL-MCNC: 8.4 MG/DL (ref 8.5–10.5)
CALCIUM SERPL-MCNC: 8 MG/DL (ref 8.5–10.5)
CHLORIDE SERPL-SCNC: 103 MMOL/L (ref 96–112)
CO2 SERPL-SCNC: 21 MMOL/L (ref 20–33)
CREAT SERPL-MCNC: 0.96 MG/DL (ref 0.5–1.4)
ERYTHROCYTE [DISTWIDTH] IN BLOOD BY AUTOMATED COUNT: 78.4 FL (ref 35.9–50)
GFR SERPLBLD CREATININE-BSD FMLA CKD-EPI: 81 ML/MIN/1.73 M 2
GLUCOSE SERPL-MCNC: 136 MG/DL (ref 65–99)
HCT VFR BLD AUTO: 30.4 % (ref 37–47)
HGB BLD-MCNC: 10 G/DL (ref 12–16)
MAGNESIUM SERPL-MCNC: 1.7 MG/DL (ref 1.5–2.5)
MCH RBC QN AUTO: 36.4 PG (ref 27–33)
MCHC RBC AUTO-ENTMCNC: 32.9 G/DL (ref 32.2–35.5)
MCV RBC AUTO: 110.5 FL (ref 81.4–97.8)
PHOSPHATE SERPL-MCNC: 3 MG/DL (ref 2.5–4.5)
PLATELET # BLD AUTO: 295 K/UL (ref 164–446)
PMV BLD AUTO: 10.1 FL (ref 9–12.9)
POTASSIUM SERPL-SCNC: 4.1 MMOL/L (ref 3.6–5.5)
RBC # BLD AUTO: 2.75 M/UL (ref 4.2–5.4)
SODIUM SERPL-SCNC: 136 MMOL/L (ref 135–145)
TSH SERPL DL<=0.005 MIU/L-ACNC: 0.75 UIU/ML (ref 0.38–5.33)
VIT B12 SERPL-MCNC: 1266 PG/ML (ref 211–911)
WBC # BLD AUTO: 8.6 K/UL (ref 4.8–10.8)

## 2025-06-25 PROCEDURE — L4398 FOOT DROP SPLINT PRE OTS: HCPCS

## 2025-06-25 PROCEDURE — 36415 COLL VENOUS BLD VENIPUNCTURE: CPT

## 2025-06-25 PROCEDURE — A9270 NON-COVERED ITEM OR SERVICE: HCPCS | Performed by: INTERNAL MEDICINE

## 2025-06-25 PROCEDURE — 700105 HCHG RX REV CODE 258: Performed by: INTERNAL MEDICINE

## 2025-06-25 PROCEDURE — 85027 COMPLETE CBC AUTOMATED: CPT

## 2025-06-25 PROCEDURE — 97535 SELF CARE MNGMENT TRAINING: CPT

## 2025-06-25 PROCEDURE — 82607 VITAMIN B-12: CPT

## 2025-06-25 PROCEDURE — 97162 PT EVAL MOD COMPLEX 30 MIN: CPT

## 2025-06-25 PROCEDURE — 80069 RENAL FUNCTION PANEL: CPT

## 2025-06-25 PROCEDURE — 84443 ASSAY THYROID STIM HORMONE: CPT

## 2025-06-25 PROCEDURE — 770001 HCHG ROOM/CARE - MED/SURG/GYN PRIV*

## 2025-06-25 PROCEDURE — 97166 OT EVAL MOD COMPLEX 45 MIN: CPT

## 2025-06-25 PROCEDURE — 700102 HCHG RX REV CODE 250 W/ 637 OVERRIDE(OP): Performed by: INTERNAL MEDICINE

## 2025-06-25 PROCEDURE — 700111 HCHG RX REV CODE 636 W/ 250 OVERRIDE (IP): Mod: JZ | Performed by: INTERNAL MEDICINE

## 2025-06-25 PROCEDURE — 83735 ASSAY OF MAGNESIUM: CPT

## 2025-06-25 PROCEDURE — 82140 ASSAY OF AMMONIA: CPT

## 2025-06-25 PROCEDURE — 99233 SBSQ HOSP IP/OBS HIGH 50: CPT | Performed by: INTERNAL MEDICINE

## 2025-06-25 RX ORDER — VITAMIN B COMPLEX
5000 TABLET ORAL DAILY
Status: DISCONTINUED | OUTPATIENT
Start: 2025-06-25 | End: 2025-06-27 | Stop reason: HOSPADM

## 2025-06-25 RX ORDER — AMOXICILLIN 250 MG
2 CAPSULE ORAL EVERY EVENING
Status: DISCONTINUED | OUTPATIENT
Start: 2025-06-25 | End: 2025-06-27 | Stop reason: HOSPADM

## 2025-06-25 RX ORDER — LACTULOSE 10 G/15ML
15 SOLUTION ORAL; RECTAL EVERY 4 HOURS PRN
Status: DISCONTINUED | OUTPATIENT
Start: 2025-06-25 | End: 2025-06-27 | Stop reason: HOSPADM

## 2025-06-25 RX ORDER — BISACODYL 10 MG
10 SUPPOSITORY, RECTAL RECTAL
Status: DISCONTINUED | OUTPATIENT
Start: 2025-06-25 | End: 2025-06-27 | Stop reason: HOSPADM

## 2025-06-25 RX ORDER — POLYETHYLENE GLYCOL 3350 17 G/17G
1 POWDER, FOR SOLUTION ORAL
Status: DISCONTINUED | OUTPATIENT
Start: 2025-06-25 | End: 2025-06-27 | Stop reason: HOSPADM

## 2025-06-25 RX ADMIN — ACETAMINOPHEN 1000 MG: 500 TABLET ORAL at 23:28

## 2025-06-25 RX ADMIN — KETOROLAC TROMETHAMINE 15 MG: 15 INJECTION, SOLUTION INTRAMUSCULAR; INTRAVENOUS at 04:50

## 2025-06-25 RX ADMIN — GABAPENTIN 100 MG: 100 CAPSULE ORAL at 16:28

## 2025-06-25 RX ADMIN — SODIUM CHLORIDE: 9 INJECTION, SOLUTION INTRAVENOUS at 04:48

## 2025-06-25 RX ADMIN — HYDROMORPHONE HYDROCHLORIDE 0.5 MG: 1 INJECTION, SOLUTION INTRAMUSCULAR; INTRAVENOUS; SUBCUTANEOUS at 06:19

## 2025-06-25 RX ADMIN — OXYCODONE HYDROCHLORIDE 10 MG: 10 TABLET ORAL at 16:29

## 2025-06-25 RX ADMIN — ACETAMINOPHEN 1000 MG: 500 TABLET ORAL at 16:28

## 2025-06-25 RX ADMIN — OXYCODONE HYDROCHLORIDE 10 MG: 10 TABLET ORAL at 01:03

## 2025-06-25 RX ADMIN — KETOROLAC TROMETHAMINE 15 MG: 15 INJECTION, SOLUTION INTRAMUSCULAR; INTRAVENOUS at 11:38

## 2025-06-25 RX ADMIN — OXYCODONE HYDROCHLORIDE 10 MG: 10 TABLET ORAL at 23:28

## 2025-06-25 RX ADMIN — OXYCODONE HYDROCHLORIDE 10 MG: 10 TABLET ORAL at 08:00

## 2025-06-25 RX ADMIN — ENOXAPARIN SODIUM 40 MG: 100 INJECTION SUBCUTANEOUS at 16:29

## 2025-06-25 RX ADMIN — ACETAMINOPHEN 1000 MG: 500 TABLET ORAL at 11:38

## 2025-06-25 RX ADMIN — OMEPRAZOLE 40 MG: 20 CAPSULE, DELAYED RELEASE ORAL at 04:49

## 2025-06-25 RX ADMIN — GABAPENTIN 100 MG: 100 CAPSULE ORAL at 11:38

## 2025-06-25 RX ADMIN — OXYCODONE HYDROCHLORIDE 10 MG: 10 TABLET ORAL at 04:49

## 2025-06-25 RX ADMIN — OXYCODONE HYDROCHLORIDE 10 MG: 10 TABLET ORAL at 11:38

## 2025-06-25 RX ADMIN — OXYCODONE HYDROCHLORIDE 10 MG: 10 TABLET ORAL at 19:40

## 2025-06-25 RX ADMIN — ESCITALOPRAM OXALATE 10 MG: 10 TABLET ORAL at 16:28

## 2025-06-25 RX ADMIN — HYDROMORPHONE HYDROCHLORIDE 0.5 MG: 1 INJECTION, SOLUTION INTRAMUSCULAR; INTRAVENOUS; SUBCUTANEOUS at 13:24

## 2025-06-25 RX ADMIN — ACETAMINOPHEN 1000 MG: 500 TABLET ORAL at 04:49

## 2025-06-25 RX ADMIN — GABAPENTIN 100 MG: 100 CAPSULE ORAL at 04:49

## 2025-06-25 RX ADMIN — Medication 5000 UNITS: at 11:38

## 2025-06-25 ASSESSMENT — LIFESTYLE VARIABLES
VISUAL DISTURBANCES: NOT PRESENT
ORIENTATION AND CLOUDING OF SENSORIUM: ORIENTED AND CAN DO SERIAL ADDITIONS
NAUSEA AND VOMITING: NO NAUSEA AND NO VOMITING
ORIENTATION AND CLOUDING OF SENSORIUM: ORIENTED AND CAN DO SERIAL ADDITIONS
PAROXYSMAL SWEATS: NO SWEAT VISIBLE
TOTAL SCORE: 3
NAUSEA AND VOMITING: NO NAUSEA AND NO VOMITING
TREMOR: *
ANXIETY: NO ANXIETY (AT EASE)
VISUAL DISTURBANCES: NOT PRESENT
AUDITORY DISTURBANCES: NOT PRESENT
TOTAL SCORE: 3
ORIENTATION AND CLOUDING OF SENSORIUM: ORIENTED AND CAN DO SERIAL ADDITIONS
HEADACHE, FULLNESS IN HEAD: NOT PRESENT
AUDITORY DISTURBANCES: NOT PRESENT
TREMOR: *
AGITATION: NORMAL ACTIVITY
TREMOR: *
TREMOR: *
AGITATION: NORMAL ACTIVITY
PAROXYSMAL SWEATS: NO SWEAT VISIBLE
VISUAL DISTURBANCES: NOT PRESENT
AUDITORY DISTURBANCES: NOT PRESENT
HEADACHE, FULLNESS IN HEAD: NOT PRESENT
NAUSEA AND VOMITING: NO NAUSEA AND NO VOMITING
TOTAL SCORE: 3
AGITATION: NORMAL ACTIVITY
HEADACHE, FULLNESS IN HEAD: NOT PRESENT
ANXIETY: NO ANXIETY (AT EASE)
VISUAL DISTURBANCES: NOT PRESENT
ANXIETY: NO ANXIETY (AT EASE)
AGITATION: NORMAL ACTIVITY
TOTAL SCORE: 3
HEADACHE, FULLNESS IN HEAD: NOT PRESENT
AUDITORY DISTURBANCES: NOT PRESENT
PAROXYSMAL SWEATS: NO SWEAT VISIBLE
NAUSEA AND VOMITING: NO NAUSEA AND NO VOMITING
PAROXYSMAL SWEATS: NO SWEAT VISIBLE
ANXIETY: NO ANXIETY (AT EASE)
ORIENTATION AND CLOUDING OF SENSORIUM: ORIENTED AND CAN DO SERIAL ADDITIONS

## 2025-06-25 ASSESSMENT — COGNITIVE AND FUNCTIONAL STATUS - GENERAL
MOVING TO AND FROM BED TO CHAIR: A LITTLE
TOILETING: A LITTLE
CLIMB 3 TO 5 STEPS WITH RAILING: A LOT
DRESSING REGULAR LOWER BODY CLOTHING: A LOT
MOVING TO AND FROM BED TO CHAIR: A LOT
SUGGESTED CMS G CODE MODIFIER DAILY ACTIVITY: CK
CLIMB 3 TO 5 STEPS WITH RAILING: A LOT
DRESSING REGULAR LOWER BODY CLOTHING: A LOT
TURNING FROM BACK TO SIDE WHILE IN FLAT BAD: A LOT
TOILETING: A LOT
SUGGESTED CMS G CODE MODIFIER MOBILITY: CK
HELP NEEDED FOR BATHING: A LITTLE
HELP NEEDED FOR BATHING: A LOT
PERSONAL GROOMING: A LITTLE
MOBILITY SCORE: 13
MOVING FROM LYING ON BACK TO SITTING ON SIDE OF FLAT BED: A LITTLE
MOVING FROM LYING ON BACK TO SITTING ON SIDE OF FLAT BED: A LOT
WALKING IN HOSPITAL ROOM: A LOT
DRESSING REGULAR UPPER BODY CLOTHING: A LITTLE
WALKING IN HOSPITAL ROOM: A LITTLE
STANDING UP FROM CHAIR USING ARMS: A LITTLE
TURNING FROM BACK TO SIDE WHILE IN FLAT BAD: A LITTLE
MOBILITY SCORE: 17
SUGGESTED CMS G CODE MODIFIER MOBILITY: CL
SUGGESTED CMS G CODE MODIFIER DAILY ACTIVITY: CK
DRESSING REGULAR UPPER BODY CLOTHING: A LITTLE
PERSONAL GROOMING: A LITTLE
STANDING UP FROM CHAIR USING ARMS: A LITTLE
DAILY ACTIVITIY SCORE: 18
DAILY ACTIVITIY SCORE: 16

## 2025-06-25 ASSESSMENT — PATIENT HEALTH QUESTIONNAIRE - PHQ9
1. LITTLE INTEREST OR PLEASURE IN DOING THINGS: NOT AT ALL
SUM OF ALL RESPONSES TO PHQ9 QUESTIONS 1 AND 2: 0

## 2025-06-25 ASSESSMENT — GAIT ASSESSMENTS
ASSISTIVE DEVICE: FRONT WHEEL WALKER
DISTANCE (FEET): 2
GAIT LEVEL OF ASSIST: CONTACT GUARD ASSIST

## 2025-06-25 ASSESSMENT — ACTIVITIES OF DAILY LIVING (ADL): TOILETING: INDEPENDENT

## 2025-06-25 ASSESSMENT — PAIN DESCRIPTION - PAIN TYPE: TYPE: SURGICAL PAIN

## 2025-06-25 NOTE — PROGRESS NOTES
Hospital Medicine Daily Progress Note    Date of Service  6/25/2025    Chief Complaint  Ankle injury    Hospital Course  Linda Carpenter is a 29 y.o. female with alcoholism, cirrhosis, and GERD, admitted 6/23/2025 with fall while camping with friends.  She tripped and fell without any loss of consciousness, dizziness, or palpitations.  On evaluation, x-ray showed close left tibia fracture.  Blood pressure was elevated.  Labs showed WBC of 13,500.  Electrolytes and renal function were normal.  AST was 47, with normal bilirubin.  Patient was placed on analgesics.  Orthopedics was consulted who recommended surgical repair.  Patient admitted to drinking heavily prior to admission, and was noted to have tremors and anxiety on admission.  She was placed on CIWA protocol. Ultrasound showed normal gallbladder and right upper quadrant ultrasound with normal liver contour with no evidence of solid mass lesion.    Interval Problem Update  6/25/2025 - I reviewed the patient's chart today.  Patient had ORIF of the left distal tibia pilon fracture with 4.0 cannulated screws and fixation of the left tibial shaft fracture with intramedullary nail yesterday 6/24/2025.  Otherwise, uneventful night. VSS. Afebrile. Saturating well on 0.5L O2 NC.  No leukocytosis.  Hemoglobin 10.0.  Vitamin D level 28.  Platelet count is normal.  Electrolytes and renal function are normal.  CIWA 3-4.  Orthopedics recommended TTWB left lower extremity with motion as tolerated weightbearing, no further pending orthopedic procedures and cleared for discharge from orthopedic standpoint pending therapy recommendations and medical optimization.    > I have personally seen and examined the patient today.  Has pain on the left leg, especially with movement.  Still has some tremors but states she usually has baseline tremors but not too bad.  Denies any diaphoresis or hallucinations.  Passing gas.  Last bowel movement was 3 days ago which is not unusual  for her.  No abdominal pain.  No nausea or vomiting.  No chest pain or shortness of breath.    I personally reviewed all lab results mentioned above. Prior medical records from this institution and outside facilities were independently reviewed as noted. I also personally reviewed all ER physician and consultant recommendations and plans as documented above. History was independently obtained by myself. I have discussed this patient's plan of care and discharge plan at IDT rounds today with Case Management, Nursing, Nursing leadership, and other members of the IDT team.    Consultants/Specialty  orthopedics    Code Status  Full Code    Disposition  The patient is not medically cleared for discharge to home or a post-acute facility.      Discharge plan TBD.  Pending PT/OT evaluation.    I have placed the appropriate orders for post-discharge needs.    Review of Systems  ROS     Pertinent positives/negatives as mentioned above.     A complete review of systems was personally done by me. All other systems were negative.       Physical Exam  Temp:  [36.1 °C (97 °F)-37 °C (98.6 °F)] 36.4 °C (97.5 °F)  Pulse:  [] 84  Resp:  [10-21] 16  BP: (104-144)/() 111/79  SpO2:  [91 %-98 %] 96 %    Physical Exam  Vitals reviewed.   Constitutional:       General: She is not in acute distress.     Appearance: Normal appearance. She is normal weight. She is not ill-appearing or diaphoretic.   HENT:      Head: Normocephalic and atraumatic.      Right Ear: External ear normal.      Left Ear: External ear normal.      Mouth/Throat:      Mouth: Mucous membranes are moist.      Pharynx: No oropharyngeal exudate or posterior oropharyngeal erythema.   Eyes:      General: No scleral icterus.     Extraocular Movements: Extraocular movements intact.      Conjunctiva/sclera: Conjunctivae normal.      Pupils: Pupils are equal, round, and reactive to light.   Cardiovascular:      Rate and Rhythm: Normal rate and regular rhythm.       Heart sounds: Normal heart sounds. No murmur heard.  Pulmonary:      Effort: Pulmonary effort is normal. No respiratory distress.      Breath sounds: Normal breath sounds. No stridor. No wheezing, rhonchi or rales.   Chest:      Chest wall: No tenderness.   Abdominal:      General: Bowel sounds are normal. There is no distension.      Palpations: Abdomen is soft. There is no mass.      Tenderness: There is no abdominal tenderness. There is no guarding or rebound.   Musculoskeletal:         General: Tenderness (left ankle) present. No swelling. Normal range of motion.      Cervical back: Normal range of motion and neck supple. No rigidity. No muscular tenderness.      Comments: (+) fine tremors  Left ankle surgical site intact, dressing CDI.   Lymphadenopathy:      Cervical: No cervical adenopathy.   Skin:     General: Skin is warm and dry.      Coloration: Skin is not jaundiced.      Findings: No rash.   Neurological:      General: No focal deficit present.      Mental Status: She is alert and oriented to person, place, and time. Mental status is at baseline.      Cranial Nerves: No cranial nerve deficit.   Psychiatric:         Mood and Affect: Mood normal.         Behavior: Behavior normal.         Thought Content: Thought content normal.         Judgment: Judgment normal.         Fluids    Intake/Output Summary (Last 24 hours) at 6/25/2025 1137  Last data filed at 6/25/2025 0449  Gross per 24 hour   Intake 850 ml   Output 1280 ml   Net -430 ml        Laboratory  Recent Labs     06/23/25  1412 06/24/25  0630 06/25/25  0101   WBC 13.5* 8.4 8.6   RBC 3.51* 3.03* 2.75*   HEMOGLOBIN 12.8 11.0* 10.0*   HEMATOCRIT 38.2 33.2* 30.4*   .8* 109.6* 110.5*   MCH 36.5* 36.3* 36.4*   MCHC 33.5 33.1 32.9   RDW 78.3* 78.2* 78.4*   PLATELETCT 485* 335 295   MPV 10.3 9.9 10.1     Recent Labs     06/23/25  1412 06/25/25  0101   SODIUM 137 136   POTASSIUM 3.9 4.1   CHLORIDE 99 103   CO2 20 21   GLUCOSE 102* 136*   BUN 13 9    CREATININE 0.85 0.96   CALCIUM 9.1 8.0*     Recent Labs     06/23/25  1412   APTT 25.5   INR 1.13               Imaging  DX-PORTABLE FLUORO > 1 HOUR   Preliminary Result      Portable fluoroscopy utilized for 2 minutes 10 seconds.         INTERPRETING LOCATION: [address], [place], [address]      DX-TIBIA AND FIBULA LEFT   Preliminary Result      Digitized intraoperative radiograph is submitted for review. This examination is not for diagnostic purpose but for guidance during a surgical procedure. Please see the patient's chart for full procedural details.         INTERPRETING LOCATION: [address], [place], [address]      US-RUQ   Final Result         1. Normal gallbladder and right upper quadrant ultrasound.      [id number] W/O PLUS RECONS LEFT   Final Result      1.  Mildly displaced, comminuted, intra-articular fracture of the distal tibia extending to the distal tibial articular surface.   2.  Small ankle joint effusion.      DX-ANKLE 3+ VIEWS LEFT   Final Result      Comminuted intra-articular fracture of the distal left tibia with regional soft tissue swelling.      DX-TIBIA AND FIBULA LEFT   Final Result      Acute closed nondisplaced intra-articular spiral fracture of the distal left tibia. Regional soft tissue swelling.           Assessment/Plan  * Fracture of left tibia- (present on admission)  Assessment & Plan  - Due to mechanical fall.  - now s/p ORIF of the left distal tibia pilon fracture with 4.0 cannulated screws and fixation of the left tibial shaft fracture with intramedullary nail on 6/24/2025.  - Vitamin D level low.  Start oral cholecalciferol replacement.  - continue pain control.  Continue scheduled Tylenol and IV Toradol, along with oral oxycodone and IV Dilaudid. Monitor for narcotic side effects particularly respiratory depression, AMS, and constipation.    -Monitor for ABLA postoperatively.  Check hemoglobin daily.  - Continue pharmacologic DVT prophylaxis while in the hospital.   Continue Lovenox SQ.   - Awaiting PT/OT evaluation.      Vitamin D insufficiency- (present on admission)  Assessment & Plan  - Start cholecalciferol 5000 units daily.    Alcohol withdrawal syndrome without complication (HCC)- (present on admission)  Assessment & Plan  - History of drinking alcohol for years and history of cirrhosis.  States she is not drinking daily anymore.  - CIWA 3-4.   - Continue as needed Ativan per CIWA protocol.  - Continue thiamine, folate and multivitamin supplementation.  - Continue to monitor withdrawal symptoms.  If worsening, escalate to higher level of care.    Macrocytic anemia- (present on admission)  Assessment & Plan  - Due to cirrhosis, alcohol dependence.  - Restrictive transfusion strategy.  Transfuse if hemoglobin drops below 7.  CBC in the morning.    Gastroesophageal reflux disease- (present on admission)  Assessment & Plan  -Continue pantoprazole    Cirrhosis (HCC)- (present on admission)  Assessment & Plan  - Due to alcohol dependence.  Patient states she has stopped drinking alcohol daily when she found out that she had cirrhosis.  - Ultrasound showed normal liver contour with no evidence of solid mass lesion.  - Continue to encourage alcohol cessation.         VTE prophylaxis: Lovenox SQ      My total time spent caring for the patient on the day of the encounter was 52 minutes. This does not include time spent on separately billable procedures/tests.

## 2025-06-25 NOTE — CARE PLAN
The patient is Stable - Low risk of patient condition declining or worsening    Shift Goals  Clinical Goals: pain control, safety, mobility, resume diet  Patient Goals: pain control, rest  Family Goals: not present    Progress made toward(s) clinical / shift goals:  yes      Problem: Pain - Standard  Goal: Alleviation of pain or a reduction in pain to the patient’s comfort goal  Description: Target End Date:  Prior to discharge or change in level of care    Document on Vitals flowsheet    1.  Document pain using the appropriate pain scale per order or unit policy  2.  Educate and implement non-pharmacologic comfort measures (i.e. relaxation, distraction, massage, cold/heat therapy, etc.)  3.  Pain management medications as ordered  4.  Reassess pain after pain med administration per policy  5.  If opiods administered assess patient's response to pain medication is appropriate per POSS sedation scale  6.  Follow pain management plan developed in collaboration with patient and interdisciplinary team (including palliative care or pain specialists if applicable)  Outcome: Progressing     Problem: Knowledge Deficit - Standard  Goal: Patient and family/care givers will demonstrate understanding of plan of care, disease process/condition, diagnostic tests and medications  Description: Target End Date:  1-3 days or as soon as patient condition allows    Document in Patient Education    1.  Patient and family/caregiver oriented to unit, equipment, visitation policy and means for communicating concern  2.  Complete/review Learning Assessment  3.  Assess knowledge level of disease process/condition, treatment plan, diagnostic tests and medications  4.  Explain disease process/condition, treatment plan, diagnostic tests and medications  Outcome: Progressing     Problem: Skin Integrity  Goal: Skin integrity is maintained or improved  Description: Target End Date:  Prior to discharge or change in level of care    Document  interventions on Skin Risk/Carmelo flowsheet groups and corresponding LDA    1.  Assess and monitor skin integrity, appearance and/or temperature  2.  Assess risk factors for impaired skin integrity and/or pressures ulcers  3.  Implement precautions to protect skin integrity in collaboration with interdisciplinary team  4.  Implement pressure ulcer prevention protocol if at risk for skin breakdown  5.  Confirm wound care consult if at risk for skin breakdown  6.  Ensure patient use of pressure relieving devices  (Low air loss bed, waffle overlay, heel protectors, ROHO cushion, etc)  Outcome: Progressing

## 2025-06-25 NOTE — PROGRESS NOTES
4 Eyes Skin Assessment Completed by Don RN and CHELSEA Brown.    Skin assessment is primarily focused on high risk bony prominences. Pay special attention to skin beneath and around medical devices, high risk bony prominences, skin to skin areas and areas where the patient lacks sensation to feel pain and areas where the patient previously had breakdown.     Head (Occipital):  WDL   Ears (Under Medical Devices): Scab and Redness- auricles   Nose (Under Medical Devices): WDL   Mouth:  WDL   Neck: WDL   Breast/Chest:  WDL   Shoulder Blades:  WDL   Spine:   WDL   (R) Arm/Elbow/Hand: Rash, Red and Blanching   (L) Arm/Elbow/Hand: Rash, Red, and Blanching   Abdomen: WDL   Pannus/Groin:  WDL   Sacrum/Coccyx:   Red and Blanching  to buttocks   (R) Ischial Tuberosity (Sit Bones):  WDL   (L) Ischial Tuberosity (Sit Bones):  WDL   (R) Leg:  Rash, Red, and Blanching   (L) Leg:  Surgical site- dressing in place   (R) Heel:  Red and Blanching   (R) Foot/Toe: WDL   (L) Heel: Dressing in place   (L) Foot/Toe:  Dressing in place       DEVICES IN USE:   Respiratory Devices:  Nasal cannula  Feeding Devices:  N/A   Lines & BP Monitoring Devices:  Peripheral IV, BP cuff, and Pulse ox    Orthopedic Devices:  Ace wrap  Miscellaneous Devices:  SCDs    PROTOCOL INTERVENTIONS:   Standard/Trauma Bed:  Already in place    WOUND PHOTOS:   Completed and in EPIC     WOUND CONSULT:   N/A, no advanced wound care needs identified

## 2025-06-25 NOTE — PROGRESS NOTES
"    Orthopedic PA Progress Note    Interval changes:  Patient doing well postop  PRAFO ordered due to patient inability to strongly dorsiflex ankle  LLE dressings are CDI  TTWB LLE  No pending ortho procedures  Cleared for DC from orthopedic standpoint pending therapy recs and medical optimization    ROS - Patient denies any new issues.  Denies any numbness or tingling. Pain controlled.    /72   Pulse 62   Temp 36.6 °C (97.9 °F) (Temporal)   Resp 16   Ht 1.651 m (5' 5\")   Wt 75.3 kg (166 lb)   SpO2 95%     Patient seen and examined  No acute distress  Breathing non labored  RRR  LLE: Surgical dressing is clean, dry, and intact. Patient clearly fires tibialis anterior, EHL, and gastrocnemius/soleus. Sensation is intact to light touch throughout superficial peroneal, deep peroneal, tibial, saphenous, and sural nerve distributions. Strong and palpable 2+ dorsalis pedis and posterior tibial pulses with capillary refill less than 2 seconds.     Recent Labs     06/23/25  1412 06/24/25  0630 06/25/25  0101   WBC 13.5* 8.4 8.6   RBC 3.51* 3.03* 2.75*   HEMOGLOBIN 12.8 11.0* 10.0*   HEMATOCRIT 38.2 33.2* 30.4*   .8* 109.6* 110.5*   MCH 36.5* 36.3* 36.4*   MCHC 33.5 33.1 32.9   RDW 78.3* 78.2* 78.4*   PLATELETCT 485* 335 295   MPV 10.3 9.9 10.1       Active Hospital Problems    Diagnosis     Alcohol withdrawal syndrome without complication (HCC) [F10.930]     Fracture of left tibia [S82.202A]     Gastroesophageal reflux disease [K21.9]     Macrocytic anemia [D53.9]     Cirrhosis (HCC) [K74.60]        DX-PORTABLE FLUORO > 1 HOUR   Preliminary Result      Portable fluoroscopy utilized for 2 minutes 10 seconds.         INTERPRETING LOCATION: [address] [place] [address], [id number]      DX-TIBIA AND FIBULA LEFT   Preliminary Result      Digitized intraoperative radiograph is submitted for review. This examination is not for diagnostic purpose but for guidance during a surgical procedure. Please see the patient's chart for " full procedural details.         INTERPRETING LOCATION: [address] [facility], [place], [address]      US-RUQ   Final Result         1. Normal gallbladder and right upper quadrant ultrasound.      [id number] W/O PLUS RECONS LEFT   Final Result      1.  Mildly displaced, comminuted, intra-articular fracture of the distal tibia extending to the distal tibial articular surface.   2.  Small ankle joint effusion.      DX-ANKLE 3+ VIEWS LEFT   Final Result      Comminuted intra-articular fracture of the distal left tibia with regional soft tissue swelling.      DX-TIBIA AND FIBULA LEFT   Final Result      Acute closed nondisplaced intra-articular spiral fracture of the distal left tibia. Regional soft tissue swelling.          Assessment/Plan:  Patient doing well postop  PRAFO ordered due to patient inability to strongly dorsiflex ankle  LLE dressings are CDI  TTWB LLE  No pending ortho procedures  Cleared for DC from orthopedic standpoint pending therapy recs and medical optimization    POD#1 S/p  1.  Open reduction internal fixation left distal tibia pilon fracture with 4.0 cannulated screws  2.  Fixation left tibial shaft fracture with intramedullary nail  Wt bearing status - TTWB LLE  Future Procedures - None planned   Wound care/Drains - Dressings to be changed every other day by nursing. Or PRN for saturation starting POD#2  Sutures/Staples out- 14-21 days post operatively. Removal will completed by ortho ZINA's unless transferred.  Inpatient DVT prophylaxis: Lovenox initiated 6/24  DVT Prophylaxis outpatient: ASA 81 mg PO BID x4 weeks  PT/OT-initiated  Antibiotics:  Perioperative completed  DVT Prophylaxis- TEDS/SCDs/Foot pumps  Case Coordination for Discharge Planning - Disposition per therapy recs.   Follow up with Dr. Mcdaniel 2 weeks following final surgery for repeat imaging and wound check. (Est follow up date 6/8)

## 2025-06-25 NOTE — DISCHARGE PLANNING
Case Management Discharge Planning    Admission Date: 6/23/2025  GMLOS: 4.2  ALOS: 2    6-Clicks ADL Score: 16  6-Clicks Mobility Score: (P) 17  PT and/or OT Eval ordered: Yes  Post-acute Referrals Ordered: NA  Post-acute Choice Obtained: NA  Has referral(s) been sent to post-acute provider:  [facility]      Anticipated Discharge Dispo: Discharge Disposition: Discharged to home/self care (01)    DME Needed: No    Action(s) Taken: Updated Provider/Nurse on Discharge Plan- Patient discussed during IDT rounds with medical team.     Escalations Completed: None    Medically Clear: No    Next Steps: Case Management will continue to follow for discharge planning needs.     Barriers to Discharge: Medical clearance    Is the patient up for discharge tomorrow: No    RN Case Manager met with patient at bedside and obtained the information used in this assessment. Patient verified accuracy of facesheet; patient lives in a single story home with her fiance.  Prior to current hospitalization, patient was independent with ADLS/IADLS. Patient has a ride and is able to attend necessary MD appointments. Patient prefers to use [facility] pharmacy in [place]. Patient has no financial concerns. Patient has support from her fiance. Has a history of substance use and denies any diagnosis of mental illness.    Care Transition Team Assessment    Information Source: Patient  Orientation Level: Oriented X4  Information Given By: Patient  Who is responsible for making decisions for patient? : Patient    Readmission Evaluation  Is this a readmission?: No    Elopement Risk  Legal Hold: No  Ambulatory or Self Mobile in Wheelchair: No-Not an Elopement Risk  Elopement Risk: Not at Risk for Elopement    Interdisciplinary Discharge Planning  Lives with - Patient's Self Care Capacity: (Patient-Rptd) (P) Significant Other  Patient or legal guardian wants to designate a caregiver: No  Support Systems: (Patient-Rptd) (P) Family Member(s)  Housing / Facility:  (Patient-Rptd) (P) [address]  Prior Services: Home-Independent  Patient Prefers to be Discharged to:: (Patient-Rptd) (P) Ill only be able to have help after 6pm my jean works from 7am til 5PM works in [place] but we live in [place]  Durable Medical Equipment: (Patient-Rptd) (P) Not Applicable    Discharge Preparedness  What is your plan after discharge?: Home with help  What are your discharge supports?: Other (comment) ([employer])  Prior Functional Level: Ambulatory, Independent with Activities of Daily Living, Independent with Medication Management  Difficulity with ADLs: None  Difficulity with IADLs: Driving    Functional Assesment  Prior Functional Level: Ambulatory, Independent with Activities of Daily Living, Independent with Medication Management    Finances  Financial Barriers to Discharge: No  Prescription Coverage: Yes    Vision / Hearing Impairment  Vision Impairment : No  Hearing Impairment : No         Advance Directive  Advance Directive?: None  Advance Directive offered?: AD Booklet refused    Domestic Abuse  Have you ever been the victim of abuse or violence?: No  Possible Abuse/Neglect Reported to:: Not Applicable    Psychological Assessment  History of Substance Abuse: Alcohol  History of Psychiatric Problems: Yes    Discharge Risks or Barriers  Discharge risks or barriers?: Substance abuse  Patient risk factors: Substance abuse    Anticipated Discharge Information  Discharge Disposition: Discharged to home/self care (01)

## 2025-06-25 NOTE — THERAPY
Physical Therapy   Initial Evaluation     Patient Name:  Linda Carpenter  Age:  29 y.o., Sex:  female  Medical Record #:  [id number]  Today's Date: 6/25/2025     Precautions  Medical: (P) Fall Risk  Orthopedic: (P) ROM as Tolerated  Weight Bearing: (P) Toe Touch Weight Bearing Left Lower Extremity    Assessment    29 y.o. female was admitted s/p fall with a L tibial fracture and EtOH withdrawal. She is now s/p L tibial pilon ORIF and cleared for TTWB LLE.    PMHx includes EtOH with cirrhosis, and GERD.  She lives with her  and children in a 1SH with 3 EVERT and was independent for mobility without an AD.  On eval, she presents with pain, orthopedic restrictions, weakness, decreased balance, and decreased activity tolerance impairing her mobility.  She was only able to tolerate ambulating 2', limited by pain.  She will benefit from continued acute PT services to address the above deficits in order to return home safely.  Recommend post acute PT services.     Plan    Physical Therapy Initial Treatment Plan   Treatment Plan : (P) Bed Mobility, Equipment, Family / Caregiver Training, Gait Training, Manual Therapy, Neuro Re-Education / Balance, Self Care / Home Evaluation, Stair Training, Therapeutic Activities, Therapeutic Exercise  Treatment Frequency: (P) 4 Times per Week  Duration: (P) Until Therapy Goals Met    DC Equipment Recommendations: (P) Unable to determine at this time  Discharge Recommendations: (P) Recommend post-acute placement for additional physical therapy services prior to discharge home            Objective       06/25/25 1054   Initial Contact Note    Initial Contact Note Order Received and Verified, Physical Therapy Evaluation in Progress with Full Report to Follow.   Precautions   Medical Fall Risk   Orthopedic ROM as Tolerated   Weight Bearing Toe Touch Weight Bearing Left Lower Extremity   Vitals   O2 (LPM) 0.5   O2 Delivery Device Silicone Nasal Cannula   Pain 0 - 10 Group   Therapist  Pain Assessment During Activity  (L ankle)   Prior Living Situation   Housing / Facility 1 Story House   Steps Into Home 3   Rail Both Rail (Steps into Home)  (too wide to grab both at the same time)   Bathroom Set up Bathtub / Shower Combination   Equipment Owned Crutches;Raised Toilet Seat With Arms;Adjustable Bed Without Rails   Lives with - Patient's Self Care Capacity Spouse;Child Less than 18 Years of Age  ( works, 4 y.o. , 9 y.o. 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   History of Falls   History of Falls Yes   Date of Last Fall 06/23/25   Cognition    Cognition / Consciousness WDL   Level of Consciousness Alert   Active ROM Upper Body   Comments WFL for mobility   Strength Upper Body   Comments WFL for mobility   Active ROM Lower Body    Active ROM Lower Body  X   Comments L ankle NT d/t recent sx   Strength Lower Body   Lower Body Strength  X   Comments L ankle NT d/t recent sx   Sensation Lower Body   Lower Extremity Sensation   WDL   Coordination Lower Body    Coordination Lower Body  WDL   Comments baseline tremor   Balance Assessment   Sitting Balance (Static) Fair +   Sitting Balance (Dynamic) Fair   Standing Balance (Static) Fair -   Standing Balance (Dynamic) Poor +   Weight Shift Sitting Fair   Weight Shift Standing Poor   Comments with FWW   Bed Mobility    Supine to Sit Standby Assist   Sit to Supine Contact Guard Assist   Scooting Standby Assist   Comments bed flat, rail, uses UEs to move LLE   Gait Analysis   Gait Level Of Assist Contact Guard Assist   Assistive Device Front Wheel Walker   Distance (Feet) 2   # of Times Distance was Traveled 1   Deviation   (swing to, poor R foot clearance.  decreased R step length)   Weight Bearing Status LLE TTWB   Functional Mobility   Sit to Stand Contact Guard Assist   Bed, Chair, Wheelchair Transfer Contact Guard Assist   Transfer Method Stand Step   Mobility with FWW   Edema / Skin Assessment   Edema / Skin  Not Assessed   Short Term Goals    Short Term Goal # 1 Pt will perform supine < > sit SPV with bed flat, no rail in 6 visits in order to set up for upright mobility   Short Term Goal # 2 Pt will perform sit < > stand SPV in 6 visits in order to prepare for ambulation   Short Term Goal # 3 Pt will ambulate 150' SPV /c  FWW in 6 visits in order to return home safely   Short Term Goal # 4 Pt will ascend/ descend 3 steps SPV in 6 visits in order to access her home   Education Group   Education Provided Role of Physical Therapist;Gait Training;Stair Training;Exercises - Supine;Transfer Status;Weight Bearing Precautions   Role of Physical Therapist Patient Response Patient;Acceptance;Explanation;Action Demonstration   Gait Training Patient Response Patient;Acceptance;Explanation;Action Demonstration;Reinforcement Needed   Stair Training Patient Response Patient;Acceptance;Explanation;Verbal Demonstration   Exercises - Supine Patient Response Patient;Acceptance;Explanation;Verbal Demonstration  (to decrease edema)   Transfer Status Patient Response Patient;Acceptance;Explanation;Action Demonstration;Reinforcement Needed   Weight Bearing Precautions Patient Response Patient;Acceptance;Explanation;Action Demonstration;Reinforcement Needed   Additional Comments ice, elevation, barriers to discharge- Patient;Acceptance;Explanation; infor acknowledged   Physical Therapy Initial Treatment Plan    Treatment Plan  Bed Mobility;Equipment;Family / Caregiver Training;Gait Training;Manual Therapy;Neuro Re-Education / Balance;Self Care / Home Evaluation;Stair Training;Therapeutic Activities;Therapeutic Exercise   Treatment Frequency 4 Times per Week   Duration Until Therapy  Goals Met   Problem List    Problems Pain;Impaired Bed Mobility;Impaired Transfers;Impaired Ambulation;Functional Strength Deficit;Impaired Balance;Decreased Activity Tolerance;Limited Knowledge of Post-Op Precautions   Anticipated Discharge Equipment and Recommendations   DC Equipment Recommendations Unable to determine at this time   Discharge Recommendations Recommend post-acute placement for additional physical therapy services prior to discharge home   Interdisciplinary Plan of Care Collaboration   IDT Collaboration with  Nursing   Patient Position at End of Therapy In Bed;Call Light within Reach;Tray Table within Reach   Collaboration Comments RN updated   Session Information   Date / Session Number  6/25 -1 (1/4, 7/1)

## 2025-06-25 NOTE — PROGRESS NOTES
Patient's last BM is 6/21/25, refused to take medication to facilitate bowel movement. Patient still refused despite education given. Currently no PRN bowel protocol in place.

## 2025-06-25 NOTE — ANESTHESIA POSTPROCEDURE EVALUATION
Patient: Linda Carpenter    Procedure Summary       Date: 06/24/25 Room / Location: [place] [address] / SURGERY [facility]    Anesthesia Start: 1555 Anesthesia Stop: 1741    Procedure: INSERTION, INTRAMEDULLARY ZOEY, LEFT TIBIA (Left: Leg Lower) Diagnosis: (left tibial shaft fracture)    Surgeons: Rashaun Mcdaniel M.D. Responsible Provider: Joey Kenney M.D.    Anesthesia Type: general ASA Status: 3            Final Anesthesia Type: general  Last vitals  BP   Blood Pressure: 124/89    Temp   36.3 °C (97.3 °F)    Pulse   (!) 101   Resp   13    SpO2   98 %      Anesthesia Post Evaluation    Patient location during evaluation: PACU  Patient participation: complete - patient participated  Level of consciousness: sleepy but conscious  Pain score: 2    Airway patency: patent  Anesthetic complications: no  Cardiovascular status: hemodynamically stable  Respiratory status: acceptable and face mask  Hydration status: euvolemic    PONV: none          No notable events documented.     Nurse Pain Score: 9 (NPRS)

## 2025-06-25 NOTE — CARE PLAN
The patient is Stable - Low risk of patient condition declining or worsening    Shift Goals  Clinical Goals: pain control, safety, mobility, BM  Patient Goals: pain control, comfort  Family Goals: not present    Progress made toward(s) clinical / shift goals:    Problem: Pain - Standard  Goal: Alleviation of pain or a reduction in pain to the patient’s comfort goal  Outcome: Progressing     Problem: Knowledge Deficit - Standard  Goal: Patient and family/care givers will demonstrate understanding of plan of care, disease process/condition, diagnostic tests and medications  Outcome: Progressing     Patient to work with both PT/OT this AM following pain medication to optimize pain control and mobility. Patient to wean from IV pain medication use and transition to primarily PO    Problem: Optimal Care for Alcohol Withdrawal  Goal: Optimal Care for the alcohol withdrawal patient  Outcome: Progressing     Problem: Seizure Precautions  Goal: Implementation of seizure precautions  Outcome: Progressing     Problem: Lifestyle Changes  Goal: Patient's ability to identify lifestyle changes and available resources to help reduce recurrence of condition will improve  Outcome: Progressing     Problem: Psychosocial  Goal: Patient's level of anxiety will decrease  Outcome: Progressing  Goal: Spiritual and cultural needs incorporated into hospitalization  Outcome: Progressing     Problem: Risk for Aspiration  Goal: Patient's risk for aspiration will be absent or decrease  Outcome: Progressing     Problem: Skin Integrity  Goal: Skin integrity is maintained or improved  Outcome: Progressing       Patient is not progressing towards the following goals:

## 2025-06-25 NOTE — THERAPY
Occupational Therapy   Initial Evaluation     Patient Name:  Linda Carpenter  Age:  29 y.o., Sex:  female  Medical Record #:  [id number]  Today's Date:  6/25/2025     Precautions  Medical: Fall Risk  Orthopedic: ROM as Tolerated  Weight Bearing: Toe Touch Weight Bearing Left Lower Extremity    Assessment  Patient is 29 y.o. female admitted after GLF w/ETOH on board found to have anemia, vitamin D insufficiency, and closed L tibial pilon fx s/p ORIF. PMHX of ETOH abuse, liver cirrhosis, and tremors in all extremities which worsen with pain and anxiousness. Pt reports lives with  in [place] who works during the day, but can help when home. 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 Reports her mother lives in [place] (and is caring for her 5yo currently) and her MIL/ELEANOR live in [place]; all can assist PRN. Currently limited by decreased functional mobility, activity tolerance, balance, adherence to precautions, and pain which are currently affecting pt's ability to complete ADLs/txfs/IADLs at baseline. Will continue to follow.     Plan    Occupational Therapy Initial Treatment Plan   Treatment Interventions: Self Care / Activities of Daily Living, Adaptive Equipment, Neuro Re-Education / Balance, Therapeutic Exercises, Therapeutic Activity, Family / Caregiver Training  Treatment Frequency: 4 Times per Week  Duration: Until Therapy Goals Met    DC Equipment Recommendations: Unable to determine at this time  Discharge Recommendations: Recommend post-acute placement for additional occupational therapy services prior to discharge home      Objective     06/25/25 1138   Prior Living Situation   Prior Services Home-Independent   Housing / Facility 1 Story House   Steps Into Home 3   Bathroom Set up Bathtub / Shower Combination   Equipment Owned Raised Toilet Seat With Arms;Crutches   Lives with - Patient's Self Care Capacity Spouse;Child Less than 18 Years of Age   Comments Pt  reports lives with  in [place] who works during the day, but can help when home. 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 Reports her mother lives in [place] (and is caring for her 3yo currently) and her MIL/ELEANOR live in [place]; all can assist PRN.   Prior Level of ADL Function   Self Feeding Independent   Grooming / Hygiene Independent   Bathing Independent   Dressing Independent   Toileting Independent   Prior Level of IADL Function   Medication Management Independent   Laundry Independent   Kitchen Mobility Independent   Finances Independent   Home Management Independent   Shopping Independent   Prior Level Of Mobility Independent Without Device in Home;Independent Without Device in Community   Driving / Transportation Relatives / Others Provide Transportation   Occupation (Pre-Hospital Vocational) Homemaker   History of Falls   History of Falls Yes   Date of Last Fall 06/23/25  (reason for admit)   Precautions   Medical Fall Risk   Orthopedic ROM as Tolerated   Weight Bearing Toe Touch Weight Bearing Left Lower Extremity   Vitals   O2 Delivery Device Room air w/o2 available   Vitals Comments on RA during session maintained low 90%s; desaturation once in supine to 90%; left O2 running  0.5L next to pt in case drops below 90%   Pain   Intervention Medication (see MAR)   Pain 0 - 10 Group   Location Leg   Location Orientation Left   Pain Rating Scale (NPRS) 10   Description Aching;Constant   Comfort Goal Comfort with Movement   Therapist Pain Assessment Post Activity;During Activity;Nurse Notified  (not quantified)   Cognition    Cognition / Consciousness WDL   Level of Consciousness Alert   Comments very pleasant and cooperative; anxiousness w/mobility   Passive ROM Upper Body   Passive ROM Upper Body WDL   Active ROM Upper Body   Active ROM Upper Body  WDL   Strength Upper Body   Upper Body Strength  WDL   Coordination Upper Body   Coordination X   Fine Motor  Coordination impaired at baseline 2/2 chronic tremors; increase with pain/anxiousness   Balance Assessment   Sitting Balance (Static) Fair +   Sitting Balance (Dynamic) Fair   Standing Balance (Static) Fair -   Standing Balance (Dynamic) Poor +   Weight Shift Sitting Fair   Weight Shift Standing Poor   Comments w/FWW   Bed Mobility    Supine to Sit Minimal Assist   Sit to Supine Minimal Assist   Scooting Minimal Assist   Comments HOB slightly elevated; use of rails   ADL Assessment   Eating Supervision   Grooming Supervision;Seated   Lower Body Dressing Moderate Assist  (threading LLE; don underwear in supine)   Toileting Maximal Assist  (purewick and bedpan)   Comments Educated on WB precautions, adaptive techniques for ADLs, safety technique for FWW and STS, home safety, DME for home (tub txf bench), and importance of continued EOB/OOB activity.   Functional Mobility   Sit to Stand Minimal Assist  (bed highly elevated)   Mobility EOB and STS only   Edema / Skin Assessment   Edema / Skin  Not Assessed   Comments dressing c/d/i   Activity Tolerance   Comments limited by fatigue, anxiousness, and pain   Patient / Family Goals   Patient / Family Goal #1 to go home   Short Term Goals   Short Term Goal # 1 LB dressing with SPV and AE PRN   Short Term Goal # 2 BSC txf with min A   Short Term Goal # 3 toileting with min A   Short Term Goal # 4 seated g/h at sink with SPV   Short Term Goal # 5 maintain TTWB during ADL/txfs w/o v/cs   Education Group   Education Provided Role of Occupational Therapist;Pathology of bedrest;Weight Bearing Precautions   Role of Occupational Therapist Patient Response Patient;Acceptance;Explanation;Verbal Demonstration;Reinforcement Needed   Weight Bearing Precautions Patient Response Patient;Acceptance;Explanation;Demonstration;Action Demonstration;Reinforcement Needed   Pathology of Bedrest Patient Response Patient;Acceptance;Explanation;Verbal Demonstration;Reinforcement Needed    Occupational Therapy Initial Treatment Plan    Treatment Interventions Self Care / Activities of Daily Living;Adaptive Equipment;Neuro Re-Education / Balance;Therapeutic Exercises;Therapeutic Activity;Family / Caregiver Training   Treatment Frequency 4 Times per Week   Duration Until Therapy Goals Met   Problem List   Problem List Decreased Active Daily Living Skills;Decreased Homemaking Skills;Decreased Upper Extremity Strength Right;Decreased Upper Extremity Strength Left;Decreased Functional Mobility;Decreased Activity Tolerance;Impaired Coordination Right Upper Extremity;Impaired Coordination Left Upper Extremity;Impaired Postural Control / Balance;Limited Knowledge of Post Op Precautions

## 2025-06-25 NOTE — PROGRESS NOTES
Patient refused bowel movement medication despite extensive education regarding importance of taking stool softeners alongside pain medications. Patient states she thinks she can go soon and that the stool softeners will make her have watery stool. If patient does not have BM tonight, patient is agreeable to taking stool softeners tomorrow.

## 2025-06-25 NOTE — OR NURSING
Patient AAOx4, calm, c/o 10/10 left lower leg surgical pain post op. Medicated per MAR, left LE elevated on pillows, ice applied to surgical sites. Patient states pain is down to 8-9/10. Patient resting comfortably, drinking gingerale, no s/s distress. LLE surgical dressing CDI, toes pink, warm, brisk capillary refill. VSS, afebrile, 2L nasal cannula 98% breathing even and unlabored. Patient denies nausea.     Significant other updated via text message.

## 2025-06-25 NOTE — ANESTHESIA TIME REPORT
Anesthesia Start and Stop Event Times       Date Time Event    6/24/2025 1542 Ready for Procedure     1555 Anesthesia Start     1741 Anesthesia Stop          Responsible Staff  06/24/25      Name Role Begin End    Joey Kenney M.D. Anesth 1555 1741          Overtime Reason:  no overtime (within assigned shift)    Comments:

## 2025-06-25 NOTE — DISCHARGE PLANNING
[facility] Transitional [facility] Coordination     Referral from: Dr Ramos  Insurance Provider on Facesheet: Medicaid  Potential Rehab Diagnosis: Ortho     Chart review indicates patient may have on going medical management and may have therapy needs to possibly meet inpatient rehab facility criteria with the goal of returning to community.     D/C support: TBD     Physiatry consultation pended per protocol. Pending therapy evals, TCC will follow.     Thank you for the referral.

## 2025-06-26 LAB
ANION GAP SERPL CALC-SCNC: 7 MMOL/L (ref 7–16)
BUN SERPL-MCNC: 8 MG/DL (ref 8–22)
CALCIUM SERPL-MCNC: 7.7 MG/DL (ref 8.5–10.5)
CHLORIDE SERPL-SCNC: 103 MMOL/L (ref 96–112)
CO2 SERPL-SCNC: 25 MMOL/L (ref 20–33)
CREAT SERPL-MCNC: 0.79 MG/DL (ref 0.5–1.4)
ERYTHROCYTE [DISTWIDTH] IN BLOOD BY AUTOMATED COUNT: 78.5 FL (ref 35.9–50)
GFR SERPLBLD CREATININE-BSD FMLA CKD-EPI: 102 ML/MIN/1.73 M 2
GLUCOSE SERPL-MCNC: 109 MG/DL (ref 65–99)
HCT VFR BLD AUTO: 27.9 % (ref 37–47)
HGB BLD-MCNC: 9.1 G/DL (ref 12–16)
MCH RBC QN AUTO: 36.3 PG (ref 27–33)
MCHC RBC AUTO-ENTMCNC: 32.6 G/DL (ref 32.2–35.5)
MCV RBC AUTO: 111.2 FL (ref 81.4–97.8)
PLATELET # BLD AUTO: 251 K/UL (ref 164–446)
PMV BLD AUTO: 10.1 FL (ref 9–12.9)
POTASSIUM SERPL-SCNC: 3.5 MMOL/L (ref 3.6–5.5)
RBC # BLD AUTO: 2.51 M/UL (ref 4.2–5.4)
SODIUM SERPL-SCNC: 135 MMOL/L (ref 135–145)
WBC # BLD AUTO: 10.5 K/UL (ref 4.8–10.8)

## 2025-06-26 PROCEDURE — A9270 NON-COVERED ITEM OR SERVICE: HCPCS | Performed by: STUDENT IN AN ORGANIZED HEALTH CARE EDUCATION/TRAINING PROGRAM

## 2025-06-26 PROCEDURE — 99255 IP/OBS CONSLTJ NEW/EST HI 80: CPT | Performed by: STUDENT IN AN ORGANIZED HEALTH CARE EDUCATION/TRAINING PROGRAM

## 2025-06-26 PROCEDURE — 97110 THERAPEUTIC EXERCISES: CPT

## 2025-06-26 PROCEDURE — 770001 HCHG ROOM/CARE - MED/SURG/GYN PRIV*

## 2025-06-26 PROCEDURE — 85027 COMPLETE CBC AUTOMATED: CPT

## 2025-06-26 PROCEDURE — 99233 SBSQ HOSP IP/OBS HIGH 50: CPT | Performed by: INTERNAL MEDICINE

## 2025-06-26 PROCEDURE — 97535 SELF CARE MNGMENT TRAINING: CPT

## 2025-06-26 PROCEDURE — 36415 COLL VENOUS BLD VENIPUNCTURE: CPT

## 2025-06-26 PROCEDURE — 80048 BASIC METABOLIC PNL TOTAL CA: CPT

## 2025-06-26 PROCEDURE — 700105 HCHG RX REV CODE 258: Performed by: INTERNAL MEDICINE

## 2025-06-26 PROCEDURE — 700102 HCHG RX REV CODE 250 W/ 637 OVERRIDE(OP): Performed by: INTERNAL MEDICINE

## 2025-06-26 PROCEDURE — A9270 NON-COVERED ITEM OR SERVICE: HCPCS | Performed by: INTERNAL MEDICINE

## 2025-06-26 PROCEDURE — 700102 HCHG RX REV CODE 250 W/ 637 OVERRIDE(OP): Performed by: STUDENT IN AN ORGANIZED HEALTH CARE EDUCATION/TRAINING PROGRAM

## 2025-06-26 PROCEDURE — 700111 HCHG RX REV CODE 636 W/ 250 OVERRIDE (IP): Mod: JZ | Performed by: INTERNAL MEDICINE

## 2025-06-26 PROCEDURE — 97530 THERAPEUTIC ACTIVITIES: CPT

## 2025-06-26 RX ORDER — SODIUM PHOSPHATE,MONO-DIBASIC 19G-7G/118
1 ENEMA (ML) RECTAL ONCE
Status: DISCONTINUED | OUTPATIENT
Start: 2025-06-26 | End: 2025-06-27 | Stop reason: HOSPADM

## 2025-06-26 RX ORDER — POTASSIUM CHLORIDE 1500 MG/1
40 TABLET, EXTENDED RELEASE ORAL ONCE
Status: COMPLETED | OUTPATIENT
Start: 2025-06-26 | End: 2025-06-26

## 2025-06-26 RX ORDER — LACTULOSE 10 G/15ML
30 SOLUTION ORAL
Status: DISPENSED | OUTPATIENT
Start: 2025-06-26 | End: 2025-06-26

## 2025-06-26 RX ORDER — POLYETHYLENE GLYCOL 3350 17 G/17G
1 POWDER, FOR SOLUTION ORAL DAILY
Status: DISCONTINUED | OUTPATIENT
Start: 2025-06-27 | End: 2025-06-27 | Stop reason: HOSPADM

## 2025-06-26 RX ADMIN — KETOROLAC TROMETHAMINE 15 MG: 15 INJECTION, SOLUTION INTRAMUSCULAR; INTRAVENOUS at 04:52

## 2025-06-26 RX ADMIN — POTASSIUM CHLORIDE 40 MEQ: 1500 TABLET, EXTENDED RELEASE ORAL at 07:12

## 2025-06-26 RX ADMIN — ACETAMINOPHEN 1000 MG: 500 TABLET ORAL at 04:53

## 2025-06-26 RX ADMIN — SODIUM CHLORIDE: 9 INJECTION, SOLUTION INTRAVENOUS at 23:57

## 2025-06-26 RX ADMIN — ESCITALOPRAM OXALATE 10 MG: 10 TABLET ORAL at 16:36

## 2025-06-26 RX ADMIN — OXYCODONE HYDROCHLORIDE 10 MG: 10 TABLET ORAL at 16:36

## 2025-06-26 RX ADMIN — THERA TABS 1 TABLET: TAB at 04:52

## 2025-06-26 RX ADMIN — FOLIC ACID 1 MG: 1 TABLET ORAL at 04:53

## 2025-06-26 RX ADMIN — ACETAMINOPHEN 1000 MG: 500 TABLET ORAL at 16:36

## 2025-06-26 RX ADMIN — ENOXAPARIN SODIUM 40 MG: 100 INJECTION SUBCUTANEOUS at 16:36

## 2025-06-26 RX ADMIN — OXYCODONE HYDROCHLORIDE 10 MG: 10 TABLET ORAL at 04:51

## 2025-06-26 RX ADMIN — OXYCODONE HYDROCHLORIDE 10 MG: 10 TABLET ORAL at 08:28

## 2025-06-26 RX ADMIN — OXYCODONE HYDROCHLORIDE 10 MG: 10 TABLET ORAL at 11:54

## 2025-06-26 RX ADMIN — KETOROLAC TROMETHAMINE 15 MG: 15 INJECTION, SOLUTION INTRAMUSCULAR; INTRAVENOUS at 00:00

## 2025-06-26 RX ADMIN — KETOROLAC TROMETHAMINE 15 MG: 15 INJECTION, SOLUTION INTRAMUSCULAR; INTRAVENOUS at 11:55

## 2025-06-26 RX ADMIN — POLYETHYLENE GLYCOL 3350 1 PACKET: 17 POWDER, FOR SOLUTION ORAL at 04:51

## 2025-06-26 RX ADMIN — GABAPENTIN 100 MG: 100 CAPSULE ORAL at 16:36

## 2025-06-26 RX ADMIN — GABAPENTIN 100 MG: 100 CAPSULE ORAL at 04:53

## 2025-06-26 RX ADMIN — KETOROLAC TROMETHAMINE 15 MG: 15 INJECTION, SOLUTION INTRAMUSCULAR; INTRAVENOUS at 23:57

## 2025-06-26 RX ADMIN — LACTULOSE 30 ML: 10 SOLUTION ORAL at 13:14

## 2025-06-26 RX ADMIN — OMEPRAZOLE 40 MG: 20 CAPSULE, DELAYED RELEASE ORAL at 04:53

## 2025-06-26 RX ADMIN — GABAPENTIN 100 MG: 100 CAPSULE ORAL at 11:54

## 2025-06-26 RX ADMIN — ACETAMINOPHEN 1000 MG: 500 TABLET ORAL at 11:53

## 2025-06-26 RX ADMIN — MAGNESIUM HYDROXIDE 15 ML: 1200 LIQUID ORAL at 04:51

## 2025-06-26 RX ADMIN — DOCUSATE SODIUM 50 MG AND SENNOSIDES 8.6 MG 2 TABLET: 8.6; 5 TABLET, FILM COATED ORAL at 16:37

## 2025-06-26 RX ADMIN — KETOROLAC TROMETHAMINE 15 MG: 15 INJECTION, SOLUTION INTRAMUSCULAR; INTRAVENOUS at 16:37

## 2025-06-26 RX ADMIN — Medication 5000 UNITS: at 04:53

## 2025-06-26 RX ADMIN — Medication 100 MG: at 04:53

## 2025-06-26 ASSESSMENT — LIFESTYLE VARIABLES
VISUAL DISTURBANCES: NOT PRESENT
AGITATION: NORMAL ACTIVITY
TREMOR: TREMOR NOT VISIBLE BUT CAN BE FELT, FINGERTIP TO FINGERTIP
PAROXYSMAL SWEATS: NO SWEAT VISIBLE
TREMOR: *
TOTAL SCORE: 3
NAUSEA AND VOMITING: NO NAUSEA AND NO VOMITING
TOTAL SCORE: 1
AUDITORY DISTURBANCES: NOT PRESENT
PAROXYSMAL SWEATS: NO SWEAT VISIBLE
HEADACHE, FULLNESS IN HEAD: NOT PRESENT
VISUAL DISTURBANCES: NOT PRESENT
NAUSEA AND VOMITING: NO NAUSEA AND NO VOMITING
ORIENTATION AND CLOUDING OF SENSORIUM: ORIENTED AND CAN DO SERIAL ADDITIONS
HEADACHE, FULLNESS IN HEAD: NOT PRESENT
ANXIETY: NO ANXIETY (AT EASE)
AUDITORY DISTURBANCES: NOT PRESENT
ANXIETY: NO ANXIETY (AT EASE)
ORIENTATION AND CLOUDING OF SENSORIUM: ORIENTED AND CAN DO SERIAL ADDITIONS
AGITATION: NORMAL ACTIVITY

## 2025-06-26 ASSESSMENT — COGNITIVE AND FUNCTIONAL STATUS - GENERAL
CLIMB 3 TO 5 STEPS WITH RAILING: A LOT
DRESSING REGULAR UPPER BODY CLOTHING: A LITTLE
SUGGESTED CMS G CODE MODIFIER MOBILITY: CK
SUGGESTED CMS G CODE MODIFIER DAILY ACTIVITY: CK
MOVING FROM LYING ON BACK TO SITTING ON SIDE OF FLAT BED: A LITTLE
STANDING UP FROM CHAIR USING ARMS: A LITTLE
PERSONAL GROOMING: A LITTLE
MOVING TO AND FROM BED TO CHAIR: A LITTLE
MOBILITY SCORE: 17
WALKING IN HOSPITAL ROOM: A LITTLE
TOILETING: A LITTLE
HELP NEEDED FOR BATHING: A LOT
DAILY ACTIVITIY SCORE: 18
DRESSING REGULAR LOWER BODY CLOTHING: A LITTLE
TURNING FROM BACK TO SIDE WHILE IN FLAT BAD: A LITTLE

## 2025-06-26 NOTE — CARE PLAN
The patient is Stable - Low risk of patient condition declining or worsening    Shift Goals  Clinical Goals: pain control, safety, mobility, bowel movement  Patient Goals: pain control, rest  Family Goals: not present    Progress made toward(s) clinical / shift goals:  yes      Problem: Pain - Standard  Goal: Alleviation of pain or a reduction in pain to the patient’s comfort goal  Description: Target End Date:  Prior to discharge or change in level of care    Document on Vitals flowsheet    1.  Document pain using the appropriate pain scale per order or unit policy  2.  Educate and implement non-pharmacologic comfort measures (i.e. relaxation, distraction, massage, cold/heat therapy, etc.)  3.  Pain management medications as ordered  4.  Reassess pain after pain med administration per policy  5.  If opiods administered assess patient's response to pain medication is appropriate per POSS sedation scale  6.  Follow pain management plan developed in collaboration with patient and interdisciplinary team (including palliative care or pain specialists if applicable)  Outcome: Progressing     Problem: Knowledge Deficit - Standard  Goal: Patient and family/care givers will demonstrate understanding of plan of care, disease process/condition, diagnostic tests and medications  Description: Target End Date:  1-3 days or as soon as patient condition allows    Document in Patient Education    1.  Patient and family/caregiver oriented to unit, equipment, visitation policy and means for communicating concern  2.  Complete/review Learning Assessment  3.  Assess knowledge level of disease process/condition, treatment plan, diagnostic tests and medications  4.  Explain disease process/condition, treatment plan, diagnostic tests and medications  Outcome: Progressing     Problem: Optimal Care for Alcohol Withdrawal  Goal: Optimal Care for the alcohol withdrawal patient  Description: Target End Date:  1 to 3 days    1.  Alcohol history  screening done on admission  2.  CIWA-AR score assessment (includes assessment of nausea/vomiting, tremor, sweats, anxiety, agitation, tactile, visual and auditory disturbances, headache and orientation/sensorium).  Document on CIWA flowsheet.  3.  Follow CIWA-AR score protocol  4.  Frequent reorientation  5.  Monitor for fluid and electrolyte imbalance.  6.  Assess for respiratory depression due to sedation (pulse ox)  7.  Consider thiamine, multivitamins, folic acid and magnesium sulfate per provider order  8.  Collaborate with Social Workers/Case Management  9.  Collaborate with mental health  Outcome: Progressing     Problem: Seizure Precautions  Goal: Implementation of seizure precautions  Description: Target End Date:  Prior to discharge or change in level of care    1.  Padded side rails up at all times  2.  Suction equipment and oxygen delivery system at bedside  3.  Continuous pulse oximeter in use  4.  Implement fall precautions, bed alarm on, bed in lowest position  5.  IV access (per order)  6.  Provide low stimulus environment, avoid exposure to triggers  7.  Instruct patient to use call light/seizure button if having warning signs of impending seizure  Outcome: Progressing     Problem: Lifestyle Changes  Goal: Patient's ability to identify lifestyle changes and available resources to help reduce recurrence of condition will improve  Description: Target End Date:  1 to 3 days    1.  Discuss recommended lifestyle changes  2.  Identify available resources and support systems  3.  Consider referral to substance abuse program  Outcome: Progressing     Problem: Psychosocial  Goal: Patient's level of anxiety will decrease  Description: Target End Date:  1-3 days or as soon as patient condition allows    1.  Collaborate with patient and family/caregiver to identify triggers and develop strategies to cope with anxiety  2.  Implement stimuli reduction, calming techniques  3.  Pharmacologic management per  provider order  4.  Encourage patient/family/care giver participation  5.  Collaborate with interdisciplinary team including Psychologist or Behavioral Health Team as needed  Outcome: Progressing  Goal: Spiritual and cultural needs incorporated into hospitalization  Description: Target End Date:  End of day 1    1.  Encourage verbalization of feelings, concerns, expectations and needs  2.  Collaborate with Case Management/  3.  Collaborate with Pastoral Care to meet spiritual needs  Outcome: Progressing     Problem: Risk for Aspiration  Goal: Patient's risk for aspiration will be absent or decrease  Description: Target End Date:  Prior to discharge or change in level of care    1.   Complete dysphagia screening on admission  2.   NPO until dysphagia screening complete or medically cleared  3.   Collaborate with Speech Therapy, Clinical Dietitian and interdisciplinary team  4.   Implement aspiration precautions  5.   Assist patient up to chair for meals  6.   Elevate head of bed 90 degrees if patient is unable to get out of bed  7.   Encourage small bites  8.   Ensure foods/liquids are of appropriate consistency  9.   Assess for any signs/symptoms of aspiration  10. Assess breath sounds and vital signs after oral intake  Outcome: Progressing     Problem: Skin Integrity  Goal: Skin integrity is maintained or improved  Description: Target End Date:  Prior to discharge or change in level of care    Document interventions on Skin Risk/[place] flowsheet groups and corresponding LDA    1.  Assess and monitor skin integrity, appearance and/or temperature  2.  Assess risk factors for impaired skin integrity and/or pressures ulcers  3.  Implement precautions to protect skin integrity in collaboration with interdisciplinary team  4.  Implement pressure ulcer prevention protocol if at risk for skin breakdown  5.  Confirm wound care consult if at risk for skin breakdown  6.  Ensure patient use of pressure relieving  devices  (Low air loss bed, waffle overlay, heel protectors, ROHO cushion, etc)  Outcome: Progressing

## 2025-06-26 NOTE — DISCHARGE PLANNING
1155: PMR to consult. Met with pt at bedside who reports she lives with her arthur in a single floor home with 3-4 EVERT in [place]. Arthur works fulltime during the day, patient's mother in [place] is currently caring for patient's 5 y/o while pt is hospitalized. Patient is agreeable to IPR, reports she will need to be independent at home while family is at work. TCC will continue to follow.    1232: Patient reporting she prefers SNF, notified cm. Per cm patient has no accepting SNFs, TCC will continue to follow for choice.     1557: Per CM patient is now agreeable to IPR at [facility]. Submitted to Medicaid for authorization.

## 2025-06-26 NOTE — PROGRESS NOTES
Hospital Medicine Daily Progress Note    Date of Service  6/26/2025    Chief Complaint  Ankle injury    Hospital Course  Linda Carpenter is a 29 y.o. female with alcoholism, cirrhosis, and GERD, admitted 6/23/2025 with fall while camping with friends.  She tripped and fell without any loss of consciousness, dizziness, or palpitations.  On evaluation, x-ray showed close left tibia fracture.  Blood pressure was elevated.  Labs showed WBC of 13,500.  Electrolytes and renal function were normal.  AST was 47, with normal bilirubin.  Patient was placed on analgesics.  Orthopedics was consulted who recommended surgical repair.  Patient admitted to drinking heavily prior to admission, and was noted to have tremors and anxiety on admission.  She was placed on CIWA protocol. Ultrasound showed normal gallbladder and right upper quadrant ultrasound with normal liver contour with no evidence of solid mass lesion. Patient had ORIF of the left distal tibia pilon fracture with 4.0 cannulated screws and fixation of the left tibial shaft fracture with intramedullary nail on  6/24/2025.  Orthopedics recommended TTWB left lower extremity with motion as tolerated weightbearing, no further pending orthopedic procedures and cleared for discharge from orthopedic standpoint pending therapy recommendations and medical optimization.    Interval Problem Update  6/26/2025 - I reviewed the patient's chart. There were no significant overnight events. Remains hemodynamically stable and afebrile. Stable on RA.  CIWA scores low, no further alcohol withdrawal.  Hemoglobin 9.1.  Platelet count is normal.  WBC count remain normal.  Potassium 3.5.  Magnesium level 1.7.  PT and OT recommending postacute placement.    > I have personally seen and examined the patient today.  Still has pain on the left ankle.  States she is able to move her leg around while in bed but not so much out of bed.  Passing gas but no bowel movement yet.  Has tremors but  not unusual for her.  Denies any diaphoresis or hallucinations.  No chest pain or shortness of breath.  No nausea or vomiting.  No GI complaints.  She is agreeable to postacute placement.    I personally reviewed all lab results mentioned above. Prior medical records from this institution and outside facilities were independently reviewed as noted. I also personally reviewed all ER physician and consultant recommendations and plans as documented above. History was independently obtained by myself. I have discussed this patient's plan of care and discharge plan at IDT rounds today with Case Management, Nursing, Nursing leadership, and other members of the IDT team.    Consultants/Specialty  orthopedics    Code Status  Full Code    Disposition  The patient is medically cleared for discharge to home or a post-acute facility.      PT/OT recommending postacute placement    I have placed the appropriate orders for post-discharge needs.    Review of Systems  ROS     Pertinent positives/negatives as mentioned above.     A complete review of systems was personally done by me. All other systems were negative.       Physical Exam  Temp:  [36.4 °C (97.5 °F)-36.7 °C (98.1 °F)] 36.5 °C (97.7 °F)  Pulse:  [65-85] 78  Resp:  [15-18] 16  BP: ()/(61-88) 114/68  SpO2:  [90 %-96 %] 90 %    Physical Exam  Vitals reviewed.   Constitutional:       General: She is not in acute distress.     Appearance: Normal appearance. She is normal weight. She is not ill-appearing or diaphoretic.   HENT:      Head: Normocephalic and atraumatic.      Right Ear: External ear normal.      Left Ear: External ear normal.      Mouth/Throat:      Mouth: Mucous membranes are moist.      Pharynx: No oropharyngeal exudate or posterior oropharyngeal erythema.   Eyes:      General: No scleral icterus.     Extraocular Movements: Extraocular movements intact.      Conjunctiva/sclera: Conjunctivae normal.      Pupils: Pupils are equal, round, and reactive to  light.   Cardiovascular:      Rate and Rhythm: Normal rate and regular rhythm.      Heart sounds: Normal heart sounds. No murmur heard.  Pulmonary:      Effort: Pulmonary effort is normal. No respiratory distress.      Breath sounds: Normal breath sounds. No stridor. No wheezing, rhonchi or rales.   Chest:      Chest wall: No tenderness.   Abdominal:      General: Bowel sounds are normal. There is no distension.      Palpations: Abdomen is soft. There is no mass.      Tenderness: There is no abdominal tenderness. There is no guarding or rebound.   Musculoskeletal:         General: Tenderness (left ankle) present. No swelling. Normal range of motion.      Cervical back: Normal range of motion and neck supple. No rigidity. No muscular tenderness.      Comments: (+) fine tremors  Left ankle surgical site intact, dressing CDI.   Lymphadenopathy:      Cervical: No cervical adenopathy.   Skin:     General: Skin is warm and dry.      Coloration: Skin is not jaundiced.      Findings: No rash.   Neurological:      General: No focal deficit present.      Mental Status: She is alert and oriented to person, place, and time. Mental status is at baseline.      Cranial Nerves: No cranial nerve deficit.   Psychiatric:         Mood and Affect: Mood normal.         Behavior: Behavior normal.         Thought Content: Thought content normal.         Judgment: Judgment normal.       I have performed the physical examination today 6/26/2025.  In review of yesterday's note, there are no new changes except as documented above.      Fluids    Intake/Output Summary (Last 24 hours) at 6/26/2025 0955  Last data filed at 6/26/2025 0633  Gross per 24 hour   Intake --   Output 1060 ml   Net -1060 ml        Laboratory  Recent Labs     06/24/25  0630 06/25/25  0101 06/26/25  0156   WBC 8.4 8.6 10.5   RBC 3.03* 2.75* 2.51*   HEMOGLOBIN 11.0* 10.0* 9.1*   HEMATOCRIT 33.2* 30.4* 27.9*   .6* 110.5* 111.2*   MCH 36.3* 36.4* 36.3*   MCHC 33.1 32.9  32.6   RDW 78.2* 78.4* 78.5*   PLATELETCT 335 295 251   MPV 9.9 10.1 10.1     Recent Labs     06/23/25  1412 06/25/25  0101 06/26/25  0156   SODIUM 137 136 135   POTASSIUM 3.9 4.1 3.5*   CHLORIDE 99 103 103   CO2 20 21 25   GLUCOSE 102* 136* 109*   BUN 13 9 8   CREATININE 0.85 0.96 0.79   CALCIUM 9.1 8.0* 7.7*     Recent Labs     06/23/25  1412   APTT 25.5   INR 1.13               Imaging  DX-PORTABLE FLUORO > 1 HOUR   Final Result      Portable fluoroscopy utilized for 2 minutes 10 seconds.         INTERPRETING LOCATION: [address], [place], [address]      DX-TIBIA AND FIBULA LEFT   Final Result      Digitized intraoperative radiograph is submitted for review. This examination is not for diagnostic purpose but for guidance during a surgical procedure. Please see the patient's chart for full procedural details.         INTERPRETING LOCATION: [address], [place], [address]      US-RUQ   Final Result         1. Normal gallbladder and right upper quadrant ultrasound.      [id number] W/O PLUS RECONS LEFT   Final Result      1.  Mildly displaced, comminuted, intra-articular fracture of the distal tibia extending to the distal tibial articular surface.   2.  Small ankle joint effusion.      DX-ANKLE 3+ VIEWS LEFT   Final Result      Comminuted intra-articular fracture of the distal left tibia with regional soft tissue swelling.      DX-TIBIA AND FIBULA LEFT   Final Result      Acute closed nondisplaced intra-articular spiral fracture of the distal left tibia. Regional soft tissue swelling.           Assessment/Plan  * Fracture of left tibia- (present on admission)  Assessment & Plan  - Due to mechanical fall.  - now s/p ORIF of the left distal tibia pilon fracture with 4.0 cannulated screws and fixation of the left tibial shaft fracture with intramedullary nail on 6/24/2025.  - Vitamin D level low.  Continue oral cholecalciferol replacement.  - continue pain control.  Continue scheduled Tylenol and IV Toradol, along with  oral oxycodone and IV Dilaudid. Monitor for narcotic side effects particularly respiratory depression, AMS, and constipation.    -Monitor for ABLA postoperatively.  Check hemoglobin daily.  - Continue pharmacologic DVT prophylaxis while in the hospital.  Continue Lovenox SQ.   - PT/OT recommending postacute placement which she is agreeable to.  Pending PM&R evaluation if will be an IPR candidate.  I discussed discharge planning with CM/JOB.      Vitamin D insufficiency- (present on admission)  Assessment & Plan  - Continue cholecalciferol 5000 units daily.    Alcohol withdrawal syndrome without complication (HCC)- (present on admission)  Assessment & Plan  - History of drinking alcohol for years and history of cirrhosis.  States she is not drinking daily anymore.  - CIWA 3.  Tremors chronic.  -Continue to monitor for withdrawal symptoms, but at this point withdrawal were less likely  - Continue thiamine, folate and multivitamin supplementation.    Macrocytic anemia- (present on admission)  Assessment & Plan  - Due to cirrhosis, alcohol dependence.  - Restrictive transfusion strategy.  Transfuse if hemoglobin drops below 7.  CBC in the morning.    Gastroesophageal reflux disease- (present on admission)  Assessment & Plan  -Continue pantoprazole    Cirrhosis (HCC)- (present on admission)  Assessment & Plan  - Due to alcohol dependence.  Patient states she has stopped drinking alcohol daily when she found out that she had cirrhosis.  - Ultrasound showed normal liver contour with no evidence of solid mass lesion.  - Continue to encourage alcohol cessation.         VTE prophylaxis: Lovenox SQ      My total time spent caring for the patient on the day of the encounter was 51 minutes. This does not include time spent on separately billable procedures/tests.

## 2025-06-26 NOTE — THERAPY
Occupational Therapy  Daily Treatment     Patient Name:  Linda Carpenter  Age:  29 y.o., Sex:  female  Medical Record #:  [id number]  Today's Date:  6/26/2025    Precautions  Medical: Fall Risk  Orthopedic: ROM as Tolerated  Weight Bearing: Toe Touch Weight Bearing Left Lower Extremity    Assessment    Pt seen for OT session. Progressing with balance, anxiousness, activity tolerance, and maintaining TTWB precautions. Encouraged sitting in chair for all meals and using BSC for all toileting. Continues to be limited by decreased functional mobility, activity tolerance, balance, adherence to precautions, and pain which are currently affecting pt's ability to complete ADLs/txfs/IADLs at baseline. Will continue to follow.     Pt is open to IRF.    Plan    Treatment Plan Status: Continue Current Treatment Plan  Type of Treatment: Self Care / Activities of Daily Living, Adaptive Equipment, Neuro Re-Education / Balance, Therapeutic Exercises, Therapeutic Activity, Family / Caregiver Training  Treatment Frequency: 4 Times per Week  Treatment Duration: Until Therapy Goals Met    DC Equipment Recommendations: Unable to determine at this time  Discharge Recommendations: Recommend post-acute placement for additional occupational therapy services prior to discharge home (PM&R consult)     Objective     06/26/25 1527   Precautions   Medical Fall Risk   Orthopedic ROM as Tolerated   Weight Bearing Toe Touch Weight Bearing Left Lower Extremity   Vitals   O2 Delivery Device None - Room Air   Pain 0 - 10 Group   Location Leg   Location Orientation Left   Therapist Pain Assessment Post Activity;During Activity;Nurse Notified  (not quantified)   Cognition    Cognition / Consciousness WDL   Level of Consciousness Alert   Comments very pleasant and cooperative; receptive to education   Passive ROM Upper Body   Passive ROM Upper Body WDL   Active ROM Upper Body   Active ROM Upper Body  WDL   Strength Upper Body   Upper Body Strength   WDL   Neuro-Muscular Treatments   Neuro-Muscular Treatments Anterior weight shift;Compensatory Strategies;Postural Changes;Postural Facilitation;Sequencing;Tactile Cuing;Verbal Cuing   Comments Stand/hop and stand pivot txfs   Other Treatments   Other Treatments Provided Continued education on adaptive techniques for ADLs, safety with FWW/txfs, pathology of bedrest, IRF vs SNF, and importance of continued OOB activity. Encouraged to get to recliner for all meals and to BSC for all toileting.   Balance   Sitting Balance (Static) Fair +   Sitting Balance (Dynamic) Fair   Standing Balance (Static) Fair -   Standing Balance (Dynamic) Fair -   Weight Shift Sitting Fair   Weight Shift Standing Fair   Skilled Intervention Verbal Cuing;Tactile Cuing;Facilitation;Compensatory Strategies   Comments w/FWW   Bed Mobility    Supine to Sit   (found up in chair)   Sit to Supine Contact Guard Assist   Scooting Standby Assist   Skilled Intervention Verbal Cuing;Compensatory Strategies;Tactile Cuing   Comments HOB flat   Activities of Daily Living   Eating Supervision   Grooming Supervision;Seated   Lower Body Dressing Minimal Assist   Toileting Minimal Assist   Skilled Intervention Verbal Cuing;Tactile Cuing;Compensatory Strategies;Facilitation   Functional Mobility   Sit to Stand Contact Guard Assist   Bed, Chair, Wheelchair Transfer Contact Guard Assist   Toilet Transfers Minimal Assist  (BSC)   Transfer Method Stand Pivot   Mobility w/FWW; chair>EOB>BSC>BTB   Skilled Intervention Verbal Cuing;Tactile Cuing;Facilitation;Compensatory Strategies   Activity Tolerance   Sitting in Chair found up in chair   Comments limited by fatigue and pain   Patient / Family Goals   Patient / Family Goal #1 to go home   Goal #1 Outcome Goal not met   Short Term Goals   Short Term Goal # 1 LB dressing with SPV and AE PRN   Goal Outcome # 1 Progressing as expected   Short Term Goal # 2 BSC txf with min A   Goal Outcome # 2 Progressing as expected    Short Term Goal # 3 toileting with min A   Goal Outcome # 3 Progressing as expected   Short Term Goal # 4 seated g/h at sink with SPV   Goal Outcome # 4 Goal not met   Short Term Goal # 5 maintain TTWB during ADL/txfs w/o v/cs   Goal Outcome # 5 Progressing as expected   Education Group   Education Provided Pathology of bedrest   Pathology of Bedrest Patient Response Patient;Acceptance;Explanation;Verbal Demonstration;Reinforcement Needed

## 2025-06-26 NOTE — DISCHARGE PLANNING
Case Management Discharge Planning    Admission Date: 6/23/2025  GMLOS: 4.2  ALOS: 3    6-Clicks ADL Score: 16  6-Clicks Mobility Score: 17  PT and/or OT Eval ordered: Yes  Post-acute Referrals Ordered: Yes  Post-acute Choice Obtained: NA  Has referral(s) been sent to post-acute provider:  Yes      Anticipated Discharge Dispo: Discharge Disposition: Discharged to home/self care (01)    DME Needed: No    Action(s) Taken: Patient was discussed in morning rounds.  Per MD, patient is medically cleared for post acute placement.  PT/OT recommending post acute placement. Pending PMR consult.  No accepting SNF facilities at this time.  RNCM asked DPA to expand SNF referrals.      1235  RNCM received notification from rehab TCN that patient declined rehab and prefers SNF.  RNCM met with patient at the bedside to discuss DC plan.  RNCM explained that there are no accepting SNFs at this time due to no available medicaid beds.  RNCM discussed expanding referrals to [facility]. Patient lives in [place] and inquired about a SNF in [facility].  RNCM discussed [facility], but unsure if they take medicaid FFS.  Patient stated if [facility] is not an option--[facility] and [place] is too far, and she would be open to [facility]. RNCM LVM for [facility] admissions.  Awaiting call back.       1433  Per Clara kim/ admissions at [facility]---they are only accepting memory care long term residents right now.      1440  RNCM met with patient at the bedside to update and patient is now agreeable to [facility].  RNCM faxed choice form to DPA.  RNCM updated rehab admissions coordinator via volate.        Escalations Completed: None    Medically Clear: Yes    Next Steps: .nex    Barriers to Discharge: Pending Placement

## 2025-06-26 NOTE — CARE PLAN
The patient is Stable - Low risk of patient condition declining or worsening    Shift Goals  Clinical Goals: pain control, encourage mobility, encourage oral intake,  Patient Goals: pain control, rest  Family Goals: NA    Progress made toward(s) clinical / shift goals:  compliant with pain medications, tolerating oral intake, adequate urine output.       Problem: Pain - Standard  Goal: Alleviation of pain or a reduction in pain to the patient’s comfort goal  Outcome: Progressing     Problem: Optimal Care for Alcohol Withdrawal  Goal: Optimal Care for the alcohol withdrawal patient  Outcome: Progressing     Problem: Seizure Precautions  Goal: Implementation of seizure precautions  Outcome: Progressing     Problem: Skin Integrity  Goal: Skin integrity is maintained or improved  Outcome: Progressing       Patient is not progressing towards the following goals: patient with poor mobility

## 2025-06-26 NOTE — CONSULTS
Physical Medicine and Rehabilitation Consultation              Date of initial consultation: 2025  Requesting provider: Braulio Ramos M.D.  Consulting provider: Luís Lobato D.O.  Reason for consultation: assess for acute inpatient rehab appropriateness  LOS: 3 Day(s)    Chief complaint: fall    HPI: The patient is a 29 y.o.  female with a past medical history of alcoholism, liver cirrhosis, GERD who presented on 2025  1:49 PM after GLF. XR showed distal L tibia fracture.  Patient admitted to drinking heavily prior to admission and was found to have tremors and anxiety.  She was placed on CIWA protocol.  Patient was evaluated by orthopedic surgery, and taken to the OR on 2025 for ORIF by Rashaun Mcdaniel MD.  TTWB LLE. Current labs remarkable for Hgb 9.1, K3.5. Vitals remarkable for intermittent hypertension.  Patient seen by PT/OT with recommendation for post-acute placement. PM&R consulted to evaluate for possible inpatient rehab admission.        On my evaluation, the patient is awake and alert, resting comfortably in bed. She complains of pain in her left leg and R abdomen. She thinks the abdominal pain is because she hasn't had a bowel movement in a while. Patient denies fevers, chills, headache, chest pain, shortness of breath, cough, nausea, vomiting, dysuria.      Social Hx:  Patient lives with her spouse in a H with 3 EVERT     At prior level of function, patient was ended.    THERAPY:  Restrictions: TTWB LLE  PT: Functional mobility   : Ambulating 2 feet with a front wheel walker contact-guard assist, sit to stand contact-guard assist, bed/chair transfer contact-guard assist    OT: ADLs  : Grooming supervision, lower dressing mod assist, toileting max assist    SLP:   None    IMAGIN2025: CT tibia and fibula left  IMPRESSION:  1.  Mildly displaced, comminuted, intra-articular fracture of the distal tibia extending to the distal tibial articular surface.  2.  Small  ankle joint effusion.    6/24/2025: US RUQ  Impression: normal gallbladder and right upper quadrant ultrasound.    PROCEDURES:  6/20/2025: Rashaun Mcdaniel MD  1.  Open reduction internal fixation left distal tibia pilon fracture with 4.0 cannulated screws  2.  Fixation left tibial shaft fracture with intramedullary nail      PMH:  Past Medical History[1]    PSH:  Past Surgical History[2]    FHX:  Family History   Problem Relation Age of Onset    Other Mother         fribromyalgia     Other Father         kidney failure    Asthma Brother        Medications:  Current Facility-Administered Medications   Medication Dose    senna-docusate (Pericolace Or Senokot S) 8.6-50 MG per tablet 2 Tablet  2 Tablet    And    polyethylene glycol/lytes (Miralax) Packet 1 Packet  1 Packet    magnesium hydroxide (Milk Of Magnesia) suspension 15 mL  15 mL    bisacodyl (Dulcolax) suppository 10 mg  10 mg    lactulose solution 10 g  15 mL    vitamin D3 (Cholecalciferol) tablet 5,000 Units  5,000 Units    acetaminophen (Tylenol) tablet 1,000 mg  1,000 mg    Followed by    [START ON 6/29/2025] acetaminophen (Tylenol) tablet 1,000 mg  1,000 mg    ketorolac (Toradol) 15 MG/ML injection 15 mg  15 mg    Followed by    [START ON 6/27/2025] ibuprofen (Motrin) tablet 800 mg  800 mg    oxyCODONE immediate-release (Roxicodone) tablet 5 mg  5 mg    Or    oxyCODONE immediate release (Roxicodone) tablet 10 mg  10 mg    Or    HYDROmorphone (Dilaudid) injection 0.5 mg  0.5 mg    gabapentin (Neurontin) capsule 100 mg  100 mg    omeprazole (PriLOSEC) capsule 40 mg  40 mg    NS infusion      enoxaparin (Lovenox) inj 40 mg  40 mg    labetalol (Normodyne/Trandate) injection 10 mg  10 mg    ondansetron (Zofran) syringe/vial injection 4 mg  4 mg    ondansetron (Zofran ODT) dispertab 4 mg  4 mg    promethazine (Phenergan) tablet 12.5-25 mg  12.5-25 mg    promethazine (Phenergan) suppository 12.5-25 mg  12.5-25 mg    prochlorperazine (Compazine) injection 5-10 mg   "5-10 mg    LORazepam (Ativan) tablet 0.5 mg  0.5 mg    LORazepam (Ativan) tablet 1 mg  1 mg    LORazepam (Ativan) tablet 2 mg  2 mg    LORazepam (Ativan) tablet 3 mg  3 mg    LORazepam (Ativan) tablet 4 mg  4 mg    Or    diazePAM (Valium) injection 10 mg  10 mg    thiamine (Vitamin B-1) tablet 100 mg  100 mg    And    folic acid (Folvite) tablet 1 mg  1 mg    And    multivitamin tablet 1 Tablet  1 Tablet    escitalopram (Lexapro) tablet 10 mg  10 mg       Allergies:  Allergies[3]      Physical Exam:  Vitals: /68   Pulse 78   Temp 36.5 °C (97.7 °F) (Temporal)   Resp 16   Ht 1.651 m (5' 5\")   Wt 75.3 kg (166 lb)   SpO2 90%   Gen: NAD  Head:  Normocephalic, atraumatic  Eyes/ Nose/ Mouth: PERRL, moist mucous membranes  Cardio: good distal perfusion, warm extremities  Pulm: normal respiratory effort, no cyanosis   Abd: Soft, Nontender, Nondistended   Ext: LLE in ace wrap    Mental status: Alert. Oriented to person, place, month and year.   Speech: fluent, no aphasia or dysarthria    Motor:      Upper Extremity  Myotome R L   Shoulder flexion C5 4/5 4/5   Elbow flexion C5 4/5 4/5   Wrist extension C6 4/5 4/5   Elbow extension C7 4/5 4/5   Finger flexion C8 4/5 4/5   Finger abduction T1 4/5 4/5     Lower Extremity Myotome R L   Hip flexion L2 4/5 2/5   Knee extension L3 4/5 2/5   Ankle dorsiflexion L4 4/5 2/5   Toe extension L5 4/5 2/5   Ankle plantarflexion S1 4/5 2/5     Sensory:   intact to light touch through out arms and legs  intact to proprioception at bilateral great toes    DTRs: 2+ in bilateral  biceps, 2+ in right patellar tendon  No clonus at right ankles  Negative babinski on the right  postiive Townsend b/l     Tone: no spasticity noted, no cogwheeling noted      Labs: Reviewed and significant for   Recent Labs     06/23/25  1412 06/24/25  0630 06/25/25  0101 06/26/25  0156   RBC 3.51* 3.03* 2.75* 2.51*   HEMOGLOBIN 12.8 11.0* 10.0* 9.1*   HEMATOCRIT 38.2 33.2* 30.4* 27.9*   PLATELETCT 485* 335 295 " 251   PROTHROMBTM 14.6  --   --   --    APTT 25.5  --   --   --    INR 1.13  --   --   --      Recent Labs     06/23/25  1412 06/25/25  0101 06/26/25  0156   SODIUM 137 136 135   POTASSIUM 3.9 4.1 3.5*   CHLORIDE 99 103 103   CO2 20 21 25   GLUCOSE 102* 136* 109*   BUN 13 9 8   CREATININE 0.85 0.96 0.79   CALCIUM 9.1 8.0* 7.7*     Recent Results (from the past 24 hours)   CBC WITHOUT DIFFERENTIAL    Collection Time: 06/26/25  1:56 AM   Result Value Ref Range    WBC 10.5 4.8 - 10.8 K/uL    RBC 2.51 (L) 4.20 - 5.40 M/uL    Hemoglobin 9.1 (L) 12.0 - 16.0 g/dL    Hematocrit 27.9 (L) 37.0 - 47.0 %    .2 (H) 81.4 - 97.8 fL    MCH 36.3 (H) 27.0 - 33.0 pg    MCHC 32.6 32.2 - 35.5 g/dL    RDW 78.5 (H) 35.9 - 50.0 fL    Platelet Count 251 164 - 446 K/uL    MPV 10.1 9.0 - 12.9 fL   Basic Metabolic Panel    Collection Time: 06/26/25  1:56 AM   Result Value Ref Range    Sodium 135 135 - 145 mmol/L    Potassium 3.5 (L) 3.6 - 5.5 mmol/L    Chloride 103 96 - 112 mmol/L    Co2 25 20 - 33 mmol/L    Glucose 109 (H) 65 - 99 mg/dL    Bun 8 8 - 22 mg/dL    Creatinine 0.79 0.50 - 1.40 mg/dL    Calcium 7.7 (L) 8.5 - 10.5 mg/dL    Anion Gap 7.0 7.0 - 16.0   ESTIMATED GFR    Collection Time: 06/26/25  1:56 AM   Result Value Ref Range    GFR (CKD-EPI) 102 >60 mL/min/1.73 m 2         ASSESSMENT:  Patient is a 29 y.o. female admitted with left distal tibia fracture and EtOH withdrawal.    [facility] Code / Diagnosis to Support: 0008.9 - Orthopaedic Disorders: Other Orthopaedic  See DISPO details below for recommendations on appropriate level of rehab for this diagnosis.    Barriers to transfer include: Insurance authorization, TCCs to verify disposition, medical clearance and bed availability     Assessment and Plan:    DISPO:  - patient is a good candidate for IRF. However patient states she prefers SNF at this time.   - she lives in a [facility] with 3 EVERT with her firemediose, who works during the day. She has a 4 year old full time, and 9 year old  every other weekend. Parents are helping with 4 year old currently. To go home safely, she will need to be modified independent during the day-time hours.   - if the patient changes her mind, we can pursue IRF.   - Please reach out with questions or request for medical management     Medical Complexity:    Left distal tibia fracture  - Secondary to mechanical GLF  - S/p ORIF on 6/24 by Rashaun Mcdaniel MD  -TTWB LLE  -PT and OT while in-house     Pain management  -Scheduled Tylenol  -Gabapentin 100 mg TID  -IV ketorolac until 6/27, then as needed ibuprofen  -prn oxycodone and IV Dilaudid    ABLA  Macrocytic anemia  - Hgb 12.8 --> 11.0 --> 10.0 --> 9.1  - Will need to be monitored closely    Acute alcohol intoxication  Alcohol withdrawal  - CIWA protocol per primary  - Thiamine folate and multivitamin    History of liver cirrhosis  - Secondary to alcohol use  - Ultrasound this admission showed normal liver contour with no evidence of solid mass lesion    Mood disorder  - Continue home Lexapro    Vitamin D insufficiency  - Cholecalciferol 5000 units daily    GERD  - Omeprazole    Bowel Management  -Senna/docusate  -last BM 6/21 - lactulose x 2, then fleet enema tonight.   -start miralax daily tomorrow    DVT PPX: Lovenox, plan for aspirin x 4 weeks in OP setting      Thank you for allowing us to participate in the care of this patient.     Total time: 85 minutes. This includes time spent reviewing patient's history, notes, labs, imaging, vitals, medications, examining patient, discussing care with patient and family including prognosis, risk reduction, benefits of treatment, and options for next stage of care, and communicating recommendations to primary team.       Luís Lobato D.O.   Physical Medicine and Rehabilitation       Please note that this dictation was created using voice recognition software. I have made every reasonable attempt to correct obvious errors, but there may be errors of grammar and possibly  content that I did not discover before finalizing the note.              [1]   Past Medical History:  Diagnosis Date    Anemia 9/4/2024    Coagulopathy (HCC) 9/4/2024    Gastroesophageal reflux disease 9/4/2024   [2]   Past Surgical History:  Procedure Laterality Date    TIBIA NAILING INTRAMEDULLARY Left 6/24/2025    Procedure: INSERTION, INTRAMEDULLARY ZOYE, LEFT TIBIA;  Surgeon: Rashaun Mcdaniel M.D.;  Location: SURGERY [facility];  Service: Orthopedics    WI UPPER GI ENDOSCOPY,DIAGNOSIS N/A 9/4/2024    Procedure: GASTROSCOPY;  Surgeon: Giuliano Fountain M.D.;  Location: SURGERY SAME DAY [facility];  Service: Gastroenterology    WI COLONOSCOPY-FLEXIBLE N/A 9/4/2024    Procedure: COLONOSCOPY;  Surgeon: Giuliano Fonutain M.D.;  Location: SURGERY SAME DAY [facility];  Service: Gastroenterology   [3]   Allergies  Allergen Reactions    Cefaclor Unspecified     RXN= as baby (increased symptoms)    Sulfamethoxazole W-Trimethoprim Rash     Rash

## 2025-06-26 NOTE — PROGRESS NOTES
Patient complaint pain 10/10 to left leg. Denies numbness. Oxycodone 10 mg PRN given.   Pulled 3000 to incentive spirometer.  Patient refused medication to help her with bowel movement. Educate patient the importance of taking medicine to aid in bowel movement and prevention of bowel problem. Patient states she is okay to take it in the morning.     Patient on her PRAFO foot drop boot to left foot.     0214- O2sat dropping to 82% on room air while patient is sleeping, provided patient with 0.5 L via nasal cannula, o2sat up to 90-92% with 0.5 L oxygen.

## 2025-06-26 NOTE — DISCHARGE PLANNING
1028   DPA sent referral to [facility] SNF's, per RN YOSELIN Lunsford.

## 2025-06-26 NOTE — THERAPY
Physical Therapy   Daily Treatment     Patient Name:  Linda Carpenter  Age:  29 y.o., Sex:  female  Medical Record #:  [id number]  Today's Date: 6/26/2025     Precautions  Medical: Fall Risk  Orthopedic: ROM as Tolerated  Weight Bearing: Toe Touch Weight Bearing Left Lower Extremity    Assessment    Pt was agreeable to PT session.  Ambulation was attempted x3, limited by burning L hip pain.  She completed a step pivot to the chair with CGA for posterior LOB.  Pt is most limited by pain and decreased activity tolerance.  PT will continue to follow.  Recommend post acute PT services.     Plan    Treatment Plan Status: (P) Continue Current Treatment Plan  Type of Treatment: Bed Mobility, Equipment, Family / Caregiver Training, Gait Training, Manual Therapy, Neuro Re-Education / Balance, Self Care / Home Evaluation, Stair Training, Therapeutic Activities, Therapeutic Exercise  Treatment Frequency: 4 Times per Week  Treatment Duration: Until Therapy Goals Met    DC Equipment Recommendations: (P) Unable to determine at this time  Discharge Recommendations: (P) Recommend post-acute placement for additional physical therapy services prior to discharge home           Objective       06/26/25 1401   Precautions   Medical Fall Risk   Orthopedic ROM as Tolerated   Weight Bearing Toe Touch Weight Bearing Left Lower Extremity   Vitals   O2 Delivery Device None - Room Air   Pain 0 - 10 Group   Therapist Pain Assessment During Activity  (L hip- burning.  Ice post session to back and L hip/ groin)   Sitting Lower Body Exercises   Comments L hip flexion, abd/ add, knee flex/ ext, ankle pumps x5 with AAROM to complete full ROM   Balance   Sitting Balance (Static) Fair +   Sitting Balance (Dynamic) Fair   Standing Balance (Static) Fair -   Standing Balance (Dynamic) Poor +   Weight Shift Sitting Fair   Weight Shift Standing Poor   Bed Mobility    Supine to Sit Standby Assist   Sit to Supine Standby Assist   Scooting Standby Assist    Comments HOB elevated, no rail, use of UEs to move the LLE   Gait Analysis   Comments 3 attempts to ambulate-  pt stood, reoprted pain in her L hip, then sat back down.  Pt received Tylenol, gabapentin, Toradol, and oxycodone ~ 2 hours prior to PT session.   Functional Mobility   Sit to Stand Contact Guard Assist   Bed, Chair, Wheelchair Transfer Contact Guard Assist  (posterior LOB)   Transfer Method Stand Step   Mobility with FWW   Activity Tolerance   Sitting in Chair post session   Short Term Goals    Short Term Goal # 1 Pt will perform supine < > sit SPV with bed flat, no rail in 6 visits in order to set up for upright mobility   Goal Outcome # 1 goal not met   Short Term Goal # 2 Pt will perform sit < > stand SPV in 6 visits in order to prepare for ambulation   Goal Outcome # 2 Goal not met   Short Term Goal # 3 Pt will ambulate 150' SPV /c  FWW in 6 visits in order to return home safely   Goal Outcome # 3 Goal not met   Short Term Goal # 4 Pt will ascend/ descend 3 steps SPV in 6 visits in order to access her home   Goal Outcome # 4 Goal not met   Physical Therapy Treatment Plan   Physical Therapy Treatment Plan Continue Current Treatment Plan   Anticipated Discharge Equipment and Recommendations   DC Equipment Recommendations Unable to determine at this time   Discharge Recommendations Recommend post-acute placement for additional physical therapy services prior to discharge home   Interdisciplinary Plan of Care Collaboration   IDT Collaboration with  Nursing   Patient Position at End of Therapy Seated;Chair Alarm On;Call Light within Reach;Tray Table within Reach   Collaboration Comments RN updated   Session Information   Date / Session Number  6/26 -2 (2/4, 7/1)

## 2025-06-26 NOTE — PREADMISSION SCREENING NOTE
Pre-Admission Screening Form    Patient Information:   Name: Linda Carpenter     MRN: [id number]       : 1995      Age: 29 y.o.   Gender: female      Race: White [7]       Marital Status: Single [1]  Family Contact: Terri Carpenter        Relationship: Mother [8]  Home Phone: [phone]           Cell Phone:   Advanced Directives: None  Code Status:  FULL  Current Attending Provider: Braulio Ramos M.D.  Referring Physician: Dr. Ramos      Physiatrist Consult: Dr. Lobato       Referral Date: 25  Primary Payor Source:  MEDICAID FFS  Secondary Payor Source:      Medical Information:   Date of Admission to Acute Care [facility]2025  Room Number: T321/00  Rehabilitation Diagnosis: 0008.9 - Orthopaedic Disorders: Other Orthopaedic  Immunization History   Administered Date(s) Administered    Tdap Vaccine 2013, 2015     Allergies[1]  Past Medical History[2]  Past Surgical History[3]    History Leading to Admission, Conditions that Caused the Need for Rehab (CMS):     Cisco Landaverde M.D.  Physician  Hospital Medicine     H&P     Signed     Date of Service: 2025  6:37 PM    Expand All Collapse All    Hospital Medicine History & Physical Note     Date of Service  2025     Primary Care Physician  Pcp Not In Computer     Consultants  Orthopedic     Code Status  Full Code     Chief Complaint    Chief Complaint  Patient presents with   Ankle Injury        History of Presenting Illness     29-year-old female with history of alcoholism, cirrhosis, and GERD who presented  with fall.  Patient was camping with friends when she tripped and fell.  Denied any loss of consciousness, no dizziness or palpitation no chest pain, patient states she is a heavy drinker and last drink few days before admission, also patient states she has cirrhosis, on admission blood pressure was elevated, patient was on room air, labs did not show anemia and AST more than 47, urine drug screen was negative,  x-ray showed closed left tibia fracture, Ortho was consulted and planning for surgery tomorrow.     I discussed the plan of care with patient, bedside RN, and ED physician.     Review of Systems  Review of Systems   Constitutional:  Positive for malaise/fatigue. Negative for chills, fever and weight loss.   HENT:  Negative for ear pain, hearing loss and tinnitus.    Eyes:  Negative for blurred vision, double vision and photophobia.   Respiratory:  Negative for cough and hemoptysis.    Cardiovascular:  Negative for chest pain, palpitations, orthopnea and claudication.   Gastrointestinal:  Negative for abdominal pain, constipation, diarrhea, nausea and vomiting.   Genitourinary:  Negative for dysuria, frequency and urgency.   Musculoskeletal:  Negative for myalgias and neck pain.   Skin:  Negative for rash.   Neurological:  Positive for tremors and weakness. Negative for dizziness, tingling and speech change.   Psychiatric/Behavioral:  The patient is nervous/anxious.          Past Medical History   has a past medical history of Anemia (9/4/2024), Coagulopathy (HCC) (9/4/2024), and Gastroesophageal reflux disease (9/4/2024).     Surgical History   has a past surgical history that includes pr upper gi endoscopy,diagnosis (N/A, 9/4/2024) and pr colonoscopy-flexible (N/A, 9/4/2024).      Family History    Family History  Family History  Problem Relation Age of Onset   Other Mother          fribromyalgia    Other Father          kidney failure   Asthma Brother            Family history reviewed with patient. There is no family history that is pertinent to the chief complaint.      Social History   reports that she has quit smoking. Her smoking use included cigarettes. She has a 1.8 pack-year smoking history. She has never used smokeless tobacco. She reports that she does not drink alcohol and does not use drugs.     Allergies  [Allergies]    [Allergies]    Allergen Reactions   Cefaclor Unspecified      RXN= as baby  (increased symptoms)   Sulfamethoxazole W-Trimethoprim Rash      Rash          Medications    Prior to Admission Medications  Prescriptions Last Dose Informant Patient Reported? Taking?  celecoxib (CELEBREX) 100 MG Cap   Patient's Home Pharmacy Yes No  Sig: Take 100 mg by mouth 2 times a day.  escitalopram (LEXAPRO) 10 MG Tab   Patient's Home Pharmacy Yes No  Sig: Take 10 mg by mouth every day.  gabapentin (NEURONTIN) 100 MG Cap   Patient's Home Pharmacy Yes No  Sig: Take 100 mg by mouth 3 times a day.  pantoprazole (PROTONIX) 40 MG Tablet Delayed Response   Patient's Home Pharmacy No No  Sig: Take 1 Tablet by mouth every day.  propranolol (INDERAL) 10 MG Tab   Patient's Home Pharmacy Yes No  Sig: Take 10 mg by mouth 2 times a day.    Facility-Administered Medications: None        Physical Exam  Temp:  [35.7 °C (96.3 °F)] 35.7 °C (96.3 °F)  Pulse:  [] 92  Resp:  [15-20] 16  BP: (122-143)/() 122/83  SpO2:  [90 %-95 %] 91 %    Blood Pressure: (!) 137/95  Temperature: (!) 35.7 °C (96.3 °F)  Pulse: 100  Respiration: 15  Pulse Oximetry: 93 %        Physical Exam  Constitutional:       General: She is not in acute distress.     Appearance: She is ill-appearing.   Eyes:      General: No scleral icterus.  Cardiovascular:      Rate and Rhythm: Tachycardia present.      Heart sounds: No murmur heard.  Pulmonary:      Breath sounds: No wheezing.   Abdominal:      General: There is no distension.      Tenderness: There is no abdominal tenderness. There is no guarding.   Musculoskeletal:         General: Tenderness and signs of injury present.      Right lower leg: No edema.      Left lower leg: No edema.      Comments: Breast on the left leg   Skin:     Coloration: Skin is not jaundiced or pale.      Findings: No bruising or lesion.   Neurological:      General: No focal deficit present.      Mental Status: She is oriented to person, place, and time. Mental status is at baseline.      Comments: Tremor           "  Laboratory:    Recent Labs    06/23/25  1412  WBC 13.5*  RBC 3.51*  HEMOGLOBIN 12.8  HEMATOCRIT 38.2  .8*  MCH 36.5*  MCHC 33.5  RDW 78.3*  PLATELETCT 485*  MPV 10.3       Recent Labs    06/23/25  1412  SODIUM 137  POTASSIUM 3.9  CHLORIDE 99  CO2 20  GLUCOSE 102*  BUN 13  CREATININE 0.85  CALCIUM 9.1       Recent Labs    06/23/25  1412  ALTSGPT 34  ASTSGOT 47*  ALKPHOSPHAT 129*  TBILIRUBIN 1.2  GLUCOSE 102*       Recent Labs    06/23/25  1412  APTT 25.5  INR 1.13     No results for input(s): \"NTPROBNP\" in the last 72 hours.      No results for input(s): \"TROPONINT\" in the last 72 hours.     Imaging:    DX-ANKLE 3+ VIEWS LEFT  Final Result     Comminuted intra-articular fracture of the distal left tibia with regional soft tissue swelling.     DX-TIBIA AND FIBULA LEFT  Final Result     Acute closed nondisplaced intra-articular spiral fracture of the distal left tibia. Regional soft tissue swelling.     [id number] W/O PLUS RECONS LEFT    (Results Pending)  US-RUQ    (Results Pending)        X-Ray:  I have personally reviewed the images and compared with prior images.  EKG:  I have personally reviewed the images and compared with prior images.     Assessment/Plan:  Justification for Admission Status  I anticipate this patient will require at least two midnights for appropriate medical management, necessitating inpatient admission because alcohol withdrawal and tibia fracture     Patient will need a Med/Surg bed on ORTHOPEDICS service .  The need is secondary to fall and tibia fracture     * Fracture of left tibia  Assessment & Plan  After mechanical fall  Ortho on board and planning for surgery  Pain control and stool softener  DVT prophylaxis  PT and OT after surgery     Alcohol withdrawal syndrome without complication (HCC)- (present on admission)  Assessment & Plan  History of drinking alcohol for years and history of cirrhosis  Patient has tremor and anxiety on admission  CIWA " protocol  Multivitamins  Encouraged the patient to quit     Anemia- (present on admission)  Assessment & Plan  Around baseline around 12  Macrocytic anemia  Check TSH and B12     Gastroesophageal reflux disease- (present on admission)  Assessment & Plan  Continue pantoprazole     Cirrhosis (HCC)- (present on admission)  Assessment & Plan  Will order ultrasound to evaluate liver and cirrhosis  Macrocytic anemia  Check alpha-fetoprotein  Encourage the patient to quit drinking  Lactulose as needed           VTE prophylaxis: SCDs/TEDs and enoxaparin ppx           Luís Lobato D.O.  Physician  Physical Medicine & Rehab     Consults     Signed     Date of Service: 6/26/2025 12:24 PM  Consult Orders  IP Consult For Physiatry [[id number]] ordered by Braulio Ramos M.D. at 06/25/25 1456       Expand All Collapse All                                                    Physical Medicine and Rehabilitation Consultation                                                                                  Date of initial consultation: 6/26/2025  Requesting provider: Braulio Ramos M.D.  Consulting provider: Luís Lobato D.O.  Reason for consultation: assess for acute inpatient rehab appropriateness  LOS: 3 Day(s)     Chief complaint: fall     HPI: The patient is a 29 y.o.  female with a past medical history of alcoholism, liver cirrhosis, GERD who presented on 6/23/2025  1:49 PM after GLF. XR showed distal L tibia fracture.  Patient admitted to drinking heavily prior to admission and was found to have tremors and anxiety.  She was placed on CIWA protocol.  Patient was evaluated by orthopedic surgery, and taken to the OR on 6/24/2025 for ORIF by Rashaun Mcdaniel MD.  TTWB LLE. Current labs remarkable for Hgb 9.1, K3.5. Vitals remarkable for intermittent hypertension.  Patient seen by PT/OT with recommendation for post-acute placement. PM&R consulted to evaluate for possible inpatient rehab admission.          On my evaluation, the  patient is awake and alert, resting comfortably in bed. She complains of pain in her left leg and R abdomen. She thinks the abdominal pain is because she hasn't had a bowel movement in a while. Patient denies fevers, chills, headache, chest pain, shortness of breath, cough, nausea, vomiting, dysuria.       Social Hx:  Patient lives with her spouse in a SSH with 3 EVERT      At prior level of function, patient was ended.     THERAPY:  Restrictions: TTWB LLE  PT: Functional mobility   : Ambulating 2 feet with a front wheel walker contact-guard assist, sit to stand contact-guard assist, bed/chair transfer contact-guard assist     OT: ADLs  : Grooming supervision, lower dressing mod assist, toileting max assist     SLP:   None     IMAGIN2025: CT tibia and fibula left  IMPRESSION:  1.  Mildly displaced, comminuted, intra-articular fracture of the distal tibia extending to the distal tibial articular surface.  2.  Small ankle joint effusion.     2025: US RUQ  Impression: normal gallbladder and right upper quadrant ultrasound.     PROCEDURES:  2025: Rashaun Mcdaniel MD  1.  Open reduction internal fixation left distal tibia pilon fracture with 4.0 cannulated screws  2.  Fixation left tibial shaft fracture with intramedullary nail        PMH:  [Past Medical History]    [Past Medical History]    Diagnosis Date   Anemia 2024   Coagulopathy (HCC) 2024   Gastroesophageal reflux disease 2024       PSH:  [Past Surgical History]    [Past Surgical History]    Procedure Laterality Date   TIBIA NAILING INTRAMEDULLARY Left 2025    Procedure: INSERTION, INTRAMEDULLARY ZOEY, LEFT TIBIA;  Surgeon: Rashaun Mcdaniel M.D.;  Location: SURGERY [facility];  Service: Orthopedics   MS UPPER GI ENDOSCOPY,DIAGNOSIS N/A 2024    Procedure: GASTROSCOPY;  Surgeon: Giuliano Fountain M.D.;  Location: SURGERY SAME DAY [facility];  Service: Gastroenterology   MS COLONOSCOPY-FLEXIBLE N/A 2024    Procedure:  COLONOSCOPY;  Surgeon: Giuliano Fountain M.D.;  Location: SURGERY SAME DAY [facility];  Service: Gastroenterology       FHX:    Family History  Family History  Problem Relation Age of Onset   Other Mother          fribromyalgia    Other Father          kidney failure   Asthma Brother            Medications:    Current Facility-Administered Medications  Medication Dose   senna-docusate (Pericolace Or Senokot S) 8.6-50 MG per tablet 2 Tablet  2 Tablet    And   polyethylene glycol/lytes (Miralax) Packet 1 Packet  1 Packet   magnesium hydroxide (Milk Of Magnesia) suspension 15 mL  15 mL   bisacodyl (Dulcolax) suppository 10 mg  10 mg   lactulose solution 10 g  15 mL   vitamin D3 (Cholecalciferol) tablet 5,000 Units  5,000 Units   acetaminophen (Tylenol) tablet 1,000 mg  1,000 mg    Followed by   [START ON 6/29/2025] acetaminophen (Tylenol) tablet 1,000 mg  1,000 mg   ketorolac (Toradol) 15 MG/ML injection 15 mg  15 mg    Followed by   [START ON 6/27/2025] ibuprofen (Motrin) tablet 800 mg  800 mg   oxyCODONE immediate-release (Roxicodone) tablet 5 mg  5 mg    Or   oxyCODONE immediate release (Roxicodone) tablet 10 mg  10 mg    Or   HYDROmorphone (Dilaudid) injection 0.5 mg  0.5 mg   gabapentin (Neurontin) capsule 100 mg  100 mg   omeprazole (PriLOSEC) capsule 40 mg  40 mg   NS infusion     enoxaparin (Lovenox) inj 40 mg  40 mg   labetalol (Normodyne/Trandate) injection 10 mg  10 mg   ondansetron (Zofran) syringe/vial injection 4 mg  4 mg   ondansetron (Zofran ODT) dispertab 4 mg  4 mg   promethazine (Phenergan) tablet 12.5-25 mg  12.5-25 mg   promethazine (Phenergan) suppository 12.5-25 mg  12.5-25 mg   prochlorperazine (Compazine) injection 5-10 mg  5-10 mg   LORazepam (Ativan) tablet 0.5 mg  0.5 mg   LORazepam (Ativan) tablet 1 mg  1 mg   LORazepam (Ativan) tablet 2 mg  2 mg   LORazepam (Ativan) tablet 3 mg  3 mg   LORazepam (Ativan) tablet 4 mg  4 mg    Or   diazePAM (Valium) injection 10 mg  10 mg   thiamine (Vitamin  "B-1) tablet 100 mg  100 mg    And   folic acid (Folvite) tablet 1 mg  1 mg    And   multivitamin tablet 1 Tablet  1 Tablet   escitalopram (Lexapro) tablet 10 mg  10 mg        Allergies:  [Allergies]    [Allergies]    Allergen Reactions   Cefaclor Unspecified      RXN= as baby (increased symptoms)   Sulfamethoxazole W-Trimethoprim Rash      Rash             Physical Exam:  Vitals: /68   Pulse 78   Temp 36.5 °C (97.7 °F) (Temporal)   Resp 16   Ht 1.651 m (5' 5\")   Wt 75.3 kg (166 lb)   SpO2 90%   Gen: NAD  Head:  Normocephalic, atraumatic  Eyes/ Nose/ Mouth: PERRL, moist mucous membranes  Cardio: good distal perfusion, warm extremities  Pulm: normal respiratory effort, no cyanosis   Abd: Soft, Nontender, Nondistended   Ext: LLE in ace wrap     Mental status: Alert. Oriented to person, place, month and year.   Speech: fluent, no aphasia or dysarthria     Motor:                              Upper Extremity  Myotome R L  Shoulder flexion C5 4/5 4/5  Elbow flexion C5 4/5 4/5  Wrist extension C6 4/5 4/5  Elbow extension C7 4/5 4/5  Finger flexion C8 4/5 4/5  Finger abduction T1 4/5 4/5       Lower Extremity Myotome R L  Hip flexion L2 4/5 2/5  Knee extension L3 4/5 2/5  Ankle dorsiflexion L4 4/5 2/5  Toe extension L5 4/5 2/5  Ankle plantarflexion S1 4/5 2/5     Sensory:   intact to light touch through out arms and legs  intact to proprioception at bilateral great toes     DTRs: 2+ in bilateral  biceps, 2+ in right patellar tendon  No clonus at right ankles  Negative babinski on the right  postiive Townsend b/l      Tone: no spasticity noted, no cogwheeling noted        Labs: Reviewed and significant for     Recent Labs    06/23/25  1412 06/24/25  0630 06/25/25  0101 06/26/25  0156  RBC 3.51* 3.03* 2.75* 2.51*  HEMOGLOBIN 12.8 11.0* 10.0* 9.1*  HEMATOCRIT 38.2 33.2* 30.4* 27.9*  PLATELETCT 485* 335 295 251  PROTHROMBTM 14.6  --   --   --   APTT 25.5  --   --   --   INR 1.13  --   --   --        Recent Labs   "  06/23/25  1412 06/25/25  0101 06/26/25  0156  SODIUM 137 136 135  POTASSIUM 3.9 4.1 3.5*  CHLORIDE 99 103 103  CO2 20 21 25  GLUCOSE 102* 136* 109*  BUN 13 9 8  CREATININE 0.85 0.96 0.79  CALCIUM 9.1 8.0* 7.7*       Recent Results  Recent Results (from the past 24 hours)  CBC WITHOUT DIFFERENTIAL    Collection Time: 06/26/25  1:56 AM  Result Value Ref Range    WBC 10.5 4.8 - 10.8 K/uL    RBC 2.51 (L) 4.20 - 5.40 M/uL    Hemoglobin 9.1 (L) 12.0 - 16.0 g/dL    Hematocrit 27.9 (L) 37.0 - 47.0 %    .2 (H) 81.4 - 97.8 fL    MCH 36.3 (H) 27.0 - 33.0 pg    MCHC 32.6 32.2 - 35.5 g/dL    RDW 78.5 (H) 35.9 - 50.0 fL    Platelet Count 251 164 - 446 K/uL    MPV 10.1 9.0 - 12.9 fL  Basic Metabolic Panel    Collection Time: 06/26/25  1:56 AM  Result Value Ref Range    Sodium 135 135 - 145 mmol/L    Potassium 3.5 (L) 3.6 - 5.5 mmol/L    Chloride 103 96 - 112 mmol/L    Co2 25 20 - 33 mmol/L    Glucose 109 (H) 65 - 99 mg/dL    Bun 8 8 - 22 mg/dL    Creatinine 0.79 0.50 - 1.40 mg/dL    Calcium 7.7 (L) 8.5 - 10.5 mg/dL    Anion Gap 7.0 7.0 - 16.0  ESTIMATED GFR    Collection Time: 06/26/25  1:56 AM  Result Value Ref Range    GFR (CKD-EPI) 102 >60 mL/min/1.73 m 2             ASSESSMENT:  Patient is a 29 y.o. female admitted with left distal tibia fracture and EtOH withdrawal.     [facility] Code / Diagnosis to Support: 0008.9 - Orthopaedic Disorders: Other Orthopaedic  See DISPO details below for recommendations on appropriate level of rehab for this diagnosis.     Barriers to transfer include: Insurance authorization, TCCs to verify disposition, medical clearance and bed availability      Assessment and Plan:     DISPO:  - patient is a good candidate for IRF. However patient states she prefers SNF at this time.   - she lives in a [facility] with 3 EVERT with her firemediose, who works during the day. She has a 4 year old full time, and 9 year old every other weekend. Parents are helping with 4 year old currently. To go home safely, she will need  to be modified independent during the day-time hours.   - if the patient changes her mind, we can pursue IRF.   - Please reach out with questions or request for medical management      Medical Complexity:     Left distal tibia fracture  - Secondary to mechanical GLF  - S/p ORIF on 6/24 by Rashaun Mcdaniel MD  -TTWB LLE  -PT and OT while in-house      Pain management  -Scheduled Tylenol  -Gabapentin 100 mg TID  -IV ketorolac until 6/27, then as needed ibuprofen  -prn oxycodone and IV Dilaudid     ABLA  Macrocytic anemia  - Hgb 12.8 --> 11.0 --> 10.0 --> 9.1  - Will need to be monitored closely     Acute alcohol intoxication  Alcohol withdrawal  - CIWA protocol per primary  - Thiamine folate and multivitamin     History of liver cirrhosis  - Secondary to alcohol use  - Ultrasound this admission showed normal liver contour with no evidence of solid mass lesion     Mood disorder  - Continue home Lexapro     Vitamin D insufficiency  - Cholecalciferol 5000 units daily     GERD  - Omeprazole     Bowel Management  -Senna/docusate  -last BM 6/21 - lactulose x 2, then fleet enema tonight.   -start miralax daily tomorrow     DVT PPX: Lovenox, plan for aspirin x 4 weeks in OP setting        Thank you for allowing us to participate in the care of this patient.      Total time: 85 minutes. This includes time spent reviewing patient's history, notes, labs, imaging, vitals, medications, examining patient, discussing care with patient and family including prognosis, risk reduction, benefits of treatment, and options for next stage of care, and communicating recommendations to primary team.         Luís Lobato D.O.   Physical Medicine and Rehabilitation         Please note that this dictation was created using voice recognition software. I have made every reasonable attempt to correct obvious errors, but there may be errors of grammar and possibly content that I did not discover before finalizing the note.       Co-morbidities: See  "[facility]  Potential Risk - Complications: Contractures, Deep Vein Thrombosis, Pain, Pneumonia, and Pressure Ulcer  Level of Risk: High    Ongoing Medical Management Needed (Medical/Nursing Needs):   Patient Active Problem List    Diagnosis Date Noted    Vitamin D insufficiency 06/25/2025    Alcohol withdrawal syndrome without complication (HCC) 06/23/2025    Fracture of left tibia 06/23/2025    Gastroesophageal reflux disease 09/04/2024    Macrocytic anemia 09/04/2024    Cirrhosis (HCC) 09/02/2024    Supervision of normal first pregnancy 02/19/2015     A/O  Current Vital Signs:   Temperature: 36.5 °C (97.7 °F) Pulse: 78 Respiration: 18 Blood Pressure: 114/68  Weight: 75.3 kg (166 lb) Height: 165.1 cm (5' 5\")  Pulse Oximetry: 90 % O2 (LPM): 0      Completed Laboratory Reports:  Recent Labs     06/24/25  0630 06/25/25  0101 06/26/25  0156   WBC 8.4 8.6 10.5   HEMOGLOBIN 11.0* 10.0* 9.1*   HEMATOCRIT 33.2* 30.4* 27.9*   PLATELETCT 335 295 251   SODIUM  --  136 135   POTASSIUM  --  4.1 3.5*   BUN  --  9 8   CREATININE  --  0.96 0.79   ALBUMIN  --  3.5  --    GLUCOSE  --  136* 109*     Additional Labs: Not Applicable    Prior Living Situation:   Housing / Facility: 1 Story House  Steps Into Home: 3  Lives with - Patient's Self Care Capacity: Significant Other  Equipment Owned: Raised Toilet Seat With Arms, Crutches    Prior Level of Function / Living Situation:   Physical Therapy: Prior Services: Home-Independent  Housing / Facility: 1 [address] House  Steps Into Home: 3  Rail: Both Rail (Steps into Home) (too wide to grab both at the same time)  Bathroom Set up: Bathtub / Shower Combination  Equipment Owned: Raised Toilet Seat With Arms, Crutches  Lives with - Patient's Self Care Capacity: Significant Other  Bed Mobility: Independent  Transfer Status: Independent  Ambulation: Independent  Assistive Devices Used: None  Stairs: Independent  Current Level of Function:   Gait Level Of Assist: Contact Guard Assist  Assistive Device: " Front Wheel Walker  Distance (Feet): 2  Deviation:  (swing to, poor R foot clearance. decreased R step length)  Weight Bearing Status: LLE TTWB  Supine to Sit: Standby Assist  Sit to Supine: Standby Assist  Scooting: Standby Assist  Comments: HOB elevated, no rail, use of UEs to move the LLE  Sit to Stand: Contact Guard Assist  Bed, Chair, Wheelchair Transfer: Contact Guard Assist (posterior LOB)  Transfer Method: Stand Step  Sitting in Chair: post session  Occupational Therapy:   Self Feeding: Independent  Grooming / Hygiene: Independent  Bathing: Independent  Dressing: Independent  Toileting: Independent  Medication Management: Independent  Laundry: Independent  Kitchen Mobility: Independent  Finances: Independent  Home Management: Independent  Shopping: Independent  Prior Level Of Mobility: Independent Without Device in Home, Independent Without Device in Community  Driving / Transportation: Relatives / Others Provide Transportation  Prior Services: Home-Independent  Housing / Facility: [address]  Occupation (Pre-Hospital Vocational): Homemaker  Current Level of Function:   Eating: Supervision  Lower Body Dressing: Moderate Assist (threading LLE; don underwear in supine)  Toileting: Maximal Assist (purewick and bedpan)  Speech Language Pathology:      Rehabilitation Prognosis/Potential: Good  Estimated Length of Stay: 10-14 days    Nursing:      Continent    Scope/Intensity of Services Recommended:  Physical Therapy: 1.5 hr / day  5 days / week. Therapeutic Interventions Required: Maximize Endurance, Mobility, Strength, and Safety  Occupational Therapy: 1.5 hr / day 5 days / week. Therapeutic Interventions Required: Maximize Self Care, ADLs, IADLs, and Energy Conservation  Rehabilitation Nursin/. Therapeutic Interventions Required: Monitor Pain, Skin, Vital Signs, Intake and Output, Labs, Safety, and Family Training  Rehabilitation Physician: 3 - 5 days / week. Therapeutic Interventions Required:  Medical Management    She requires 24-hour rehabilitation nursing to manage bowel and bladder function, skin care, surgical incision, nutrition and fluid intake, pain control, safety, medication management, and patient/family goals. In addition, rehabilitation nursing will reiterate and reinforce therapy skills and equipment use, including ADLs, as well as provide education to the patient and family. Linda Carpenter is willing to participate in and is able to tolerate the proposed plan of care.    Rehabilitation Goals and Plan (Expected frequency & duration of treatment in the IRF):   Return to the Community, Modified Independent Level of Care, and Minimal Assist Level of Care  Anticipated Date of Rehabilitation Admission: 6/26/25  Patient/Family oriented IRF level of care/facility/plan: Yes  Patient/Family willing to participate in IRF care/facility/plan: Yes  Patient able to tolerate IRF level of care proposed: Yes  Patient has potential to benefit IRF level of care proposed: Yes  Comments: Not Applicable    Special Needs or Precautions - Medical Necessity:  Safety Concerns/Precautions:  Fall Risk / High Risk for Falls and Balance  Pain Management  Current Medications:  Current Medications and Prescriptions Ordered in Epic[4]  Diet:   DIET ORDERS (From admission to next 24h)       Start     Ordered    06/25/25 1031  Diet Order Diet: Regular  ALL MEALS        Question:  Diet:  Answer:  Regular    06/25/25 1030                    Anticipated Discharge Destination / Patient/Family Goal:  Destination: Home with Assistance Support System: Spouse and Family   Anticipated home health services: TBD  Previously used HH service/ provider: Not Applicable  Anticipated DME Needs: TBD  Outpatient Services: TBD  Alternative resources to address additional identified needs:   No future appointments.   Pre-Screen Completed: 6/26/2025 3:52 PM Tyesha Galindo       [1]   Allergies  Allergen Reactions    Cefaclor  Unspecified     RXN= as baby (increased symptoms)    Sulfamethoxazole W-Trimethoprim Rash     Rash     [2]   Past Medical History:  Diagnosis Date    Anemia 9/4/2024    Coagulopathy (HCC) 9/4/2024    Gastroesophageal reflux disease 9/4/2024   [3]   Past Surgical History:  Procedure Laterality Date    TIBIA NAILING INTRAMEDULLARY Left 6/24/2025    Procedure: INSERTION, INTRAMEDULLARY ZOEY, LEFT TIBIA;  Surgeon: Rashaun Mcdaniel M.D.;  Location: SURGERY [facility];  Service: Orthopedics    CT UPPER GI ENDOSCOPY,DIAGNOSIS N/A 9/4/2024    Procedure: GASTROSCOPY;  Surgeon: Giuliano Fountain M.D.;  Location: SURGERY SAME DAY [facility];  Service: Gastroenterology    CT COLONOSCOPY-FLEXIBLE N/A 9/4/2024    Procedure: COLONOSCOPY;  Surgeon: Giuliano Fountain M.D.;  Location: SURGERY SAME DAY [facility];  Service: Gastroenterology   [4]   Current Facility-Administered Medications Ordered in Epic   Medication Dose Route Frequency Provider Last Rate Last Admin    lactulose 20 GM/30ML solution 30 mL  30 mL Oral Q3HRS Luís Luis Alfredo, D.O.   30 mL at 06/26/25 1314    sodium phosphate enema 1 Enema  1 Enema Rectal Once Luís Lobato, D.O.        [START ON 6/27/2025] polyethylene glycol/lytes (Miralax) Packet 1 Packet  1 Packet Oral DAILY Luís Luis Alfredo, D.O.        senna-docusate (Pericolace Or Senokot S) 8.6-50 MG per tablet 2 Tablet  2 Tablet Oral Q EVENING Braulio Ramos M.D.        And    polyethylene glycol/lytes (Miralax) Packet 1 Packet  1 Packet Oral QDAY PRN Braulio Ramos M.D.   1 Packet at 06/26/25 0451    magnesium hydroxide (Milk Of Magnesia) suspension 15 mL  15 mL Oral QDAY PRN Braulio Ramos M.D.   15 mL at 06/26/25 0451    bisacodyl (Dulcolax) suppository 10 mg  10 mg Rectal QDAY PRN Braulio Ramos M.D.        lactulose solution 10 g  15 mL Oral Q4HRS PRN Braulio Ramos M.D.        vitamin D3 (Cholecalciferol) tablet 5,000 Units  5,000 Units Oral DAILY Braulio Ramos M.D.   5,000 Units at 06/26/25 3251     acetaminophen (Tylenol) tablet 1,000 mg  1,000 mg Oral Q6HRS Braulio Ramos M.D.   1,000 mg at 06/26/25 1153    Followed by    [START ON 6/29/2025] acetaminophen (Tylenol) tablet 1,000 mg  1,000 mg Oral Q6HRS PRN Braulio Ramos M.D.        ketorolac (Toradol) 15 MG/ML injection 15 mg  15 mg Intravenous Q6HRS Braulio Ramos M.D.   15 mg at 06/26/25 1155    Followed by    [START ON 6/27/2025] ibuprofen (Motrin) tablet 800 mg  800 mg Oral TID PRN Braulio Ramos M.D.        oxyCODONE immediate-release (Roxicodone) tablet 5 mg  5 mg Oral Q3HRS PRN Braulio Ramos M.D.        Or    oxyCODONE immediate release (Roxicodone) tablet 10 mg  10 mg Oral Q3HRS PRN Braulio Ramos M.D.   10 mg at 06/26/25 1154    Or    HYDROmorphone (Dilaudid) injection 0.5 mg  0.5 mg Intravenous Q3HRS PRN Braulio Ramos M.D.   0.5 mg at 06/25/25 1324    gabapentin (Neurontin) capsule 100 mg  100 mg Oral TID Cisco Landaverde M.D.   100 mg at 06/26/25 1154    omeprazole (PriLOSEC) capsule 40 mg  40 mg Oral DAILY Cisco Landaverde M.D.   40 mg at 06/26/25 0453    NS infusion   Intravenous Continuous Cisco Landaverde M.D. 83 mL/hr at 06/25/25 0448 New Bag at 06/25/25 0448    enoxaparin (Lovenox) inj 40 mg  40 mg Subcutaneous DAILY AT 1800 Cisco Landaverde M.D.   40 mg at 06/25/25 1629    labetalol (Normodyne/Trandate) injection 10 mg  10 mg Intravenous Q4HRS PRN Cisco Landaverde M.D.        ondansetron (Zofran) syringe/vial injection 4 mg  4 mg Intravenous Q4HRS PRN Cisco Landaverde M.D.   4 mg at 06/23/25 2026    ondansetron (Zofran ODT) dispertab 4 mg  4 mg Oral Q4HRS PRN Cisco Landaverde M.D.        promethazine (Phenergan) tablet 12.5-25 mg  12.5-25 mg Oral Q4HRS PRN Cisco Landaverde M.D.        promethazine (Phenergan) suppository 12.5-25 mg  12.5-25 mg Rectal Q4HRS PRN Cisco Landaverde M.D.        prochlorperazine (Compazine) injection 5-10 mg  5-10 mg Intravenous Q4HRS PRN Cisco Landaverde M.D.        LORazepam (Ativan)  tablet 0.5 mg  0.5 mg Oral Q4HRS PRN Cisco Landaverde M.D.        LORazepam (Ativan) tablet 1 mg  1 mg Oral Q4HRS PRN Cisco Landaverde M.D.        LORazepam (Ativan) tablet 2 mg  2 mg Oral Q2HRS PRN Cisco Landaverde M.D.        LORazepam (Ativan) tablet 3 mg  3 mg Oral Q HOUR PRN Cisco Landaverde M.D.        LORazepam (Ativan) tablet 4 mg  4 mg Oral Q15 MIN PRN Cisco Landaverde M.D.        Or    diazePAM (Valium) injection 10 mg  10 mg Intravenous Q15 MIN PRN Cisco Landaverde M.D.        thiamine (Vitamin B-1) tablet 100 mg  100 mg Oral DAILY Cisco Landaverde M.D.   100 mg at 06/26/25 0453    And    folic acid (Folvite) tablet 1 mg  1 mg Oral DAILY Cisco Landaverde M.D.   1 mg at 06/26/25 0453    And    multivitamin tablet 1 Tablet  1 Tablet Oral DAILY Cisco Landaverde M.D.   1 Tablet at 06/26/25 0452    escitalopram (Lexapro) tablet 10 mg  10 mg Oral Q EVENING Cisco Landaverde M.D.   10 mg at 06/25/25 1628     Current Outpatient Medications Ordered in Epic   Medication Sig Dispense Refill    oxyCODONE immediate-release (ROXICODONE) 5 MG Tab Take 1 Tablet by mouth every 6 hours as needed for Severe Pain for up to 3 days. 10 Tablet 0

## 2025-06-26 NOTE — PROGRESS NOTES
"    Orthopedic PA Progress Note    Interval changes:  Patient doing well overall. Pending post acute placement  PRAFO in place  LLE dressings are CDI  TTWB LLE  No pending ortho procedures  Cleared for DC from orthopedic standpoint     ROS - Patient denies any new issues.  Denies any numbness or tingling. Pain controlled.    /68   Pulse 78   Temp 36.5 °C (97.7 °F) (Temporal)   Resp 16   Ht 1.651 m (5' 5\")   Wt 75.3 kg (166 lb)   SpO2 90%     Patient seen and examined  No acute distress  Breathing non labored  RRR  LLE: Surgical dressing is clean, dry, and intact. Patient clearly fires tibialis anterior, EHL, and gastrocnemius/soleus. Sensation is intact to light touch throughout superficial peroneal, deep peroneal, tibial, saphenous, and sural nerve distributions. Strong and palpable 2+ dorsalis pedis and posterior tibial pulses with capillary refill less than 2 seconds.     Recent Labs     06/24/25  0630 06/25/25  0101 06/26/25  0156   WBC 8.4 8.6 10.5   RBC 3.03* 2.75* 2.51*   HEMOGLOBIN 11.0* 10.0* 9.1*   HEMATOCRIT 33.2* 30.4* 27.9*   .6* 110.5* 111.2*   MCH 36.3* 36.4* 36.3*   MCHC 33.1 32.9 32.6   RDW 78.2* 78.4* 78.5*   PLATELETCT 335 295 251   MPV 9.9 10.1 10.1       Active Hospital Problems    Diagnosis     Vitamin D insufficiency [E55.9]      Priority: Medium    Alcohol withdrawal syndrome without complication (HCC) [F10.930]     Fracture of left tibia [S82.202A]     Gastroesophageal reflux disease [K21.9]     Macrocytic anemia [D53.9]     Cirrhosis (HCC) [K74.60]        DX-PORTABLE FLUORO > 1 HOUR   Final Result      Portable fluoroscopy utilized for 2 minutes 10 seconds.         INTERPRETING LOCATION: [address] [place] [address], [address]      DX-TIBIA AND FIBULA LEFT   Final Result      Digitized intraoperative radiograph is submitted for review. This examination is not for diagnostic purpose but for guidance during a surgical procedure. Please see the patient's chart for full procedural " details.         INTERPRETING LOCATION: [address] [place] [address] [place], [address]      US-RUQ   Final Result         1. Normal gallbladder and right upper quadrant ultrasound.      [id number] W/O PLUS RECONS LEFT   Final Result      1.  Mildly displaced, comminuted, intra-articular fracture of the distal tibia extending to the distal tibial articular surface.   2.  Small ankle joint effusion.      DX-ANKLE 3+ VIEWS LEFT   Final Result      Comminuted intra-articular fracture of the distal left tibia with regional soft tissue swelling.      DX-TIBIA AND FIBULA LEFT   Final Result      Acute closed nondisplaced intra-articular spiral fracture of the distal left tibia. Regional soft tissue swelling.          Assessment/Plan:  Patient doing well overall. Pending post acute placement  PRAFO in place  LLE dressings are CDI  TTWB LLE  No pending ortho procedures  Cleared for DC from orthopedic standpoint     POD#2 S/p  1.  Open reduction internal fixation left distal tibia pilon fracture with 4.0 cannulated screws  2.  Fixation left tibial shaft fracture with intramedullary nail  Wt bearing status - TTWB LLE  Future Procedures - None planned   Wound care/Drains - Dressings to be changed every other day by nursing. Or PRN for saturation starting POD#2  Sutures/Staples out- 14-21 days post operatively. Removal will completed by ortho [facility]'s unless transferred.  Inpatient DVT prophylaxis: Lovenox initiated 6/24  DVT Prophylaxis outpatient: ASA 81 mg PO BID x4 weeks  PT/OT-initiated  Antibiotics:  Perioperative completed  DVT Prophylaxis- TEDS/SCDs/Foot pumps  Case Coordination for Discharge Planning - Disposition per therapy recs.   Follow up with Dr. Mcdaniel 2 weeks following final surgery for repeat imaging and wound check. (Est follow up date 7/8)

## 2025-06-27 ENCOUNTER — HOSPITAL ENCOUNTER (INPATIENT)
Facility: REHABILITATION | Age: 30
DRG: 560 | End: 2025-06-27
Attending: PHYSICAL MEDICINE & REHABILITATION | Admitting: PHYSICAL MEDICINE & REHABILITATION
Payer: MEDICAID

## 2025-06-27 VITALS
DIASTOLIC BLOOD PRESSURE: 70 MMHG | WEIGHT: 166 LBS | HEART RATE: 76 BPM | HEIGHT: 65 IN | BODY MASS INDEX: 27.66 KG/M2 | TEMPERATURE: 97.5 F | SYSTOLIC BLOOD PRESSURE: 104 MMHG | OXYGEN SATURATION: 95 % | RESPIRATION RATE: 15 BRPM

## 2025-06-27 DIAGNOSIS — R18.8 CIRRHOSIS OF LIVER WITH ASCITES, UNSPECIFIED HEPATIC CIRRHOSIS TYPE (HCC): ICD-10-CM

## 2025-06-27 DIAGNOSIS — K21.9 GASTROESOPHAGEAL REFLUX DISEASE, UNSPECIFIED WHETHER ESOPHAGITIS PRESENT: ICD-10-CM

## 2025-06-27 DIAGNOSIS — K74.60 CIRRHOSIS OF LIVER WITH ASCITES, UNSPECIFIED HEPATIC CIRRHOSIS TYPE (HCC): ICD-10-CM

## 2025-06-27 DIAGNOSIS — Z87.81 HISTORY OF LOWER LEG FRACTURE: Primary | ICD-10-CM

## 2025-06-27 DIAGNOSIS — S82.302A CLOSED FRACTURE OF DISTAL END OF LEFT TIBIA, UNSPECIFIED FRACTURE MORPHOLOGY, INITIAL ENCOUNTER: ICD-10-CM

## 2025-06-27 DIAGNOSIS — S82.252A CLOSED DISPLACED COMMINUTED FRACTURE OF SHAFT OF LEFT TIBIA, INITIAL ENCOUNTER: ICD-10-CM

## 2025-06-27 DIAGNOSIS — E55.9 VITAMIN D INSUFFICIENCY: ICD-10-CM

## 2025-06-27 LAB
ANION GAP SERPL CALC-SCNC: 9 MMOL/L (ref 7–16)
BUN SERPL-MCNC: 7 MG/DL (ref 8–22)
CALCIUM SERPL-MCNC: 8.2 MG/DL (ref 8.5–10.5)
CHLORIDE SERPL-SCNC: 108 MMOL/L (ref 96–112)
CO2 SERPL-SCNC: 21 MMOL/L (ref 20–33)
CREAT SERPL-MCNC: 0.72 MG/DL (ref 0.5–1.4)
ERYTHROCYTE [DISTWIDTH] IN BLOOD BY AUTOMATED COUNT: 79.7 FL (ref 35.9–50)
GFR SERPLBLD CREATININE-BSD FMLA CKD-EPI: 114 ML/MIN/1.73 M 2
GLUCOSE SERPL-MCNC: 90 MG/DL (ref 65–99)
HCT VFR BLD AUTO: 29.3 % (ref 37–47)
HGB BLD-MCNC: 9.7 G/DL (ref 12–16)
MCH RBC QN AUTO: 37 PG (ref 27–33)
MCHC RBC AUTO-ENTMCNC: 33.1 G/DL (ref 32.2–35.5)
MCV RBC AUTO: 111.8 FL (ref 81.4–97.8)
PLATELET # BLD AUTO: 261 K/UL (ref 164–446)
PMV BLD AUTO: 10.6 FL (ref 9–12.9)
POTASSIUM SERPL-SCNC: 3.7 MMOL/L (ref 3.6–5.5)
RBC # BLD AUTO: 2.62 M/UL (ref 4.2–5.4)
SODIUM SERPL-SCNC: 138 MMOL/L (ref 135–145)
WBC # BLD AUTO: 8.6 K/UL (ref 4.8–10.8)

## 2025-06-27 PROCEDURE — 700102 HCHG RX REV CODE 250 W/ 637 OVERRIDE(OP): Performed by: INTERNAL MEDICINE

## 2025-06-27 PROCEDURE — 700111 HCHG RX REV CODE 636 W/ 250 OVERRIDE (IP): Mod: JZ | Performed by: INTERNAL MEDICINE

## 2025-06-27 PROCEDURE — 700102 HCHG RX REV CODE 250 W/ 637 OVERRIDE(OP): Performed by: PHYSICAL MEDICINE & REHABILITATION

## 2025-06-27 PROCEDURE — 36415 COLL VENOUS BLD VENIPUNCTURE: CPT

## 2025-06-27 PROCEDURE — 770010 HCHG ROOM/CARE - REHAB SEMI PRIVAT*

## 2025-06-27 PROCEDURE — 85027 COMPLETE CBC AUTOMATED: CPT

## 2025-06-27 PROCEDURE — A9270 NON-COVERED ITEM OR SERVICE: HCPCS | Performed by: INTERNAL MEDICINE

## 2025-06-27 PROCEDURE — A9270 NON-COVERED ITEM OR SERVICE: HCPCS | Performed by: PHYSICAL MEDICINE & REHABILITATION

## 2025-06-27 PROCEDURE — 99239 HOSP IP/OBS DSCHRG MGMT >30: CPT | Performed by: INTERNAL MEDICINE

## 2025-06-27 PROCEDURE — 80048 BASIC METABOLIC PNL TOTAL CA: CPT

## 2025-06-27 PROCEDURE — 303554 HCHG ELASTIC BANDAGE 6IN

## 2025-06-27 PROCEDURE — 700111 HCHG RX REV CODE 636 W/ 250 OVERRIDE (IP): Mod: JZ | Performed by: PHYSICAL MEDICINE & REHABILITATION

## 2025-06-27 PROCEDURE — 94760 N-INVAS EAR/PLS OXIMETRY 1: CPT

## 2025-06-27 RX ORDER — OXYCODONE HYDROCHLORIDE 5 MG/1
5 TABLET ORAL
Status: DISCONTINUED | OUTPATIENT
Start: 2025-06-27 | End: 2025-06-28

## 2025-06-27 RX ORDER — VITAMIN B COMPLEX
5000 TABLET ORAL DAILY
Status: CANCELLED | OUTPATIENT
Start: 2025-06-28

## 2025-06-27 RX ORDER — ALUMINA, MAGNESIA, AND SIMETHICONE 2400; 2400; 240 MG/30ML; MG/30ML; MG/30ML
20 SUSPENSION ORAL
Status: DISCONTINUED | OUTPATIENT
Start: 2025-06-27 | End: 2025-07-03 | Stop reason: HOSPADM

## 2025-06-27 RX ORDER — VITAMIN B COMPLEX
5000 TABLET ORAL DAILY
Status: DISCONTINUED | OUTPATIENT
Start: 2025-06-28 | End: 2025-07-03 | Stop reason: HOSPADM

## 2025-06-27 RX ORDER — LIDOCAINE 4 G/G
1-2 PATCH TOPICAL
Status: DISCONTINUED | OUTPATIENT
Start: 2025-06-27 | End: 2025-07-03 | Stop reason: HOSPADM

## 2025-06-27 RX ORDER — ESCITALOPRAM OXALATE 10 MG/1
10 TABLET ORAL EVERY EVENING
Status: DISCONTINUED | OUTPATIENT
Start: 2025-06-27 | End: 2025-07-03 | Stop reason: HOSPADM

## 2025-06-27 RX ORDER — AMOXICILLIN 250 MG
2 CAPSULE ORAL EVERY EVENING
Status: SHIPPED
Start: 2025-06-27

## 2025-06-27 RX ORDER — ACETAMINOPHEN 500 MG
1000 TABLET ORAL EVERY 6 HOURS
Status: CANCELLED | OUTPATIENT
Start: 2025-06-27 | End: 2025-06-29

## 2025-06-27 RX ORDER — POLYETHYLENE GLYCOL 3350 17 G/17G
1 POWDER, FOR SOLUTION ORAL DAILY
Status: DISCONTINUED | OUTPATIENT
Start: 2025-06-28 | End: 2025-07-03 | Stop reason: HOSPADM

## 2025-06-27 RX ORDER — ACETAMINOPHEN 500 MG
1000 TABLET ORAL EVERY 6 HOURS
Status: COMPLETED | OUTPATIENT
Start: 2025-06-27 | End: 2025-06-29

## 2025-06-27 RX ORDER — POLYETHYLENE GLYCOL 3350 17 G/17G
17 POWDER, FOR SOLUTION ORAL DAILY
Status: ON HOLD
Start: 2025-06-28 | End: 2025-07-02

## 2025-06-27 RX ORDER — IBUPROFEN 800 MG/1
800 TABLET, FILM COATED ORAL 3 TIMES DAILY PRN
Status: CANCELLED | OUTPATIENT
Start: 2025-06-27

## 2025-06-27 RX ORDER — TRAZODONE HYDROCHLORIDE 50 MG/1
50 TABLET ORAL
Status: DISCONTINUED | OUTPATIENT
Start: 2025-06-27 | End: 2025-07-03 | Stop reason: HOSPADM

## 2025-06-27 RX ORDER — ONDANSETRON 4 MG/1
4 TABLET, ORALLY DISINTEGRATING ORAL 4 TIMES DAILY PRN
Status: DISCONTINUED | OUTPATIENT
Start: 2025-06-27 | End: 2025-07-03 | Stop reason: HOSPADM

## 2025-06-27 RX ORDER — OXYCODONE HYDROCHLORIDE 5 MG/1
5-10 TABLET ORAL EVERY 6 HOURS PRN
Status: ON HOLD
Start: 2025-06-27 | End: 2025-07-02

## 2025-06-27 RX ORDER — ENOXAPARIN SODIUM 100 MG/ML
40 INJECTION SUBCUTANEOUS DAILY
Status: DISCONTINUED | OUTPATIENT
Start: 2025-06-27 | End: 2025-07-03 | Stop reason: HOSPADM

## 2025-06-27 RX ORDER — AMOXICILLIN 250 MG
2 CAPSULE ORAL EVERY EVENING
Status: DISCONTINUED | OUTPATIENT
Start: 2025-06-27 | End: 2025-07-03 | Stop reason: HOSPADM

## 2025-06-27 RX ORDER — POLYETHYLENE GLYCOL 3350 17 G/17G
1 POWDER, FOR SOLUTION ORAL DAILY
Status: CANCELLED | OUTPATIENT
Start: 2025-06-28

## 2025-06-27 RX ORDER — CHOLECALCIFEROL (VITAMIN D3) 25 MCG
5000 TABLET ORAL DAILY
Status: ON HOLD
Start: 2025-06-28 | End: 2025-07-02

## 2025-06-27 RX ORDER — OXYCODONE HYDROCHLORIDE 10 MG/1
10 TABLET ORAL
Refills: 0 | Status: CANCELLED | OUTPATIENT
Start: 2025-06-27

## 2025-06-27 RX ORDER — ENOXAPARIN SODIUM 100 MG/ML
40 INJECTION SUBCUTANEOUS DAILY
Status: CANCELLED | OUTPATIENT
Start: 2025-06-27

## 2025-06-27 RX ORDER — POLYETHYLENE GLYCOL 3350 17 G/17G
1 POWDER, FOR SOLUTION ORAL
Status: DISCONTINUED | OUTPATIENT
Start: 2025-06-27 | End: 2025-07-03 | Stop reason: HOSPADM

## 2025-06-27 RX ORDER — ACETAMINOPHEN 500 MG
TABLET ORAL
Status: SHIPPED
Start: 2025-06-27 | End: 2025-07-07

## 2025-06-27 RX ORDER — OMEPRAZOLE 20 MG/1
40 CAPSULE, DELAYED RELEASE ORAL DAILY
Status: DISCONTINUED | OUTPATIENT
Start: 2025-06-28 | End: 2025-07-03 | Stop reason: HOSPADM

## 2025-06-27 RX ORDER — ACETAMINOPHEN 500 MG
1000 TABLET ORAL EVERY 6 HOURS PRN
Status: CANCELLED | OUTPATIENT
Start: 2025-06-29

## 2025-06-27 RX ORDER — GABAPENTIN 100 MG/1
100 CAPSULE ORAL 3 TIMES DAILY
Status: DISCONTINUED | OUTPATIENT
Start: 2025-06-27 | End: 2025-07-03 | Stop reason: HOSPADM

## 2025-06-27 RX ORDER — BISACODYL 5 MG
5 TABLET, DELAYED RELEASE (ENTERIC COATED) ORAL
Status: DISCONTINUED | OUTPATIENT
Start: 2025-06-27 | End: 2025-07-03 | Stop reason: HOSPADM

## 2025-06-27 RX ORDER — IBUPROFEN 400 MG/1
800 TABLET, FILM COATED ORAL 3 TIMES DAILY PRN
Status: DISCONTINUED | OUTPATIENT
Start: 2025-06-27 | End: 2025-07-03 | Stop reason: HOSPADM

## 2025-06-27 RX ORDER — FOLIC ACID 1 MG/1
1 TABLET ORAL DAILY
Status: COMPLETED | OUTPATIENT
Start: 2025-06-28 | End: 2025-06-28

## 2025-06-27 RX ORDER — ACETAMINOPHEN 500 MG
1000 TABLET ORAL EVERY 6 HOURS PRN
Status: DISCONTINUED | OUTPATIENT
Start: 2025-06-29 | End: 2025-07-03 | Stop reason: HOSPADM

## 2025-06-27 RX ORDER — GAUZE BANDAGE 2" X 2"
100 BANDAGE TOPICAL DAILY
Status: CANCELLED | OUTPATIENT
Start: 2025-06-28 | End: 2025-06-29

## 2025-06-27 RX ORDER — LACTULOSE 10 G/15ML
30 SOLUTION ORAL
Status: DISCONTINUED | OUTPATIENT
Start: 2025-06-27 | End: 2025-07-03 | Stop reason: HOSPADM

## 2025-06-27 RX ORDER — OXYCODONE HYDROCHLORIDE 5 MG/1
5 TABLET ORAL
Refills: 0 | Status: CANCELLED | OUTPATIENT
Start: 2025-06-27

## 2025-06-27 RX ORDER — CARBOXYMETHYLCELLULOSE SODIUM 5 MG/ML
1 SOLUTION/ DROPS OPHTHALMIC PRN
Status: DISCONTINUED | OUTPATIENT
Start: 2025-06-27 | End: 2025-07-03 | Stop reason: HOSPADM

## 2025-06-27 RX ORDER — GABAPENTIN 100 MG/1
100 CAPSULE ORAL 3 TIMES DAILY
Status: CANCELLED | OUTPATIENT
Start: 2025-06-27

## 2025-06-27 RX ORDER — GAUZE BANDAGE 2" X 2"
100 BANDAGE TOPICAL DAILY
Status: COMPLETED | OUTPATIENT
Start: 2025-06-28 | End: 2025-06-28

## 2025-06-27 RX ORDER — ONDANSETRON 2 MG/ML
4 INJECTION INTRAMUSCULAR; INTRAVENOUS 4 TIMES DAILY PRN
Status: DISCONTINUED | OUTPATIENT
Start: 2025-06-27 | End: 2025-07-03 | Stop reason: HOSPADM

## 2025-06-27 RX ORDER — FOLIC ACID 1 MG/1
1 TABLET ORAL DAILY
Status: CANCELLED | OUTPATIENT
Start: 2025-06-28 | End: 2025-06-29

## 2025-06-27 RX ORDER — OXYCODONE HYDROCHLORIDE 10 MG/1
10 TABLET ORAL
Status: DISCONTINUED | OUTPATIENT
Start: 2025-06-27 | End: 2025-06-28

## 2025-06-27 RX ORDER — ESCITALOPRAM OXALATE 10 MG/1
10 TABLET ORAL EVERY EVENING
Status: CANCELLED | OUTPATIENT
Start: 2025-06-27

## 2025-06-27 RX ORDER — SODIUM PHOSPHATE,MONO-DIBASIC 19G-7G/118
1 ENEMA (ML) RECTAL
Status: DISCONTINUED | OUTPATIENT
Start: 2025-06-27 | End: 2025-07-03 | Stop reason: HOSPADM

## 2025-06-27 RX ORDER — OMEPRAZOLE 20 MG/1
40 CAPSULE, DELAYED RELEASE ORAL DAILY
Status: CANCELLED | OUTPATIENT
Start: 2025-06-28

## 2025-06-27 RX ORDER — ECHINACEA PURPUREA EXTRACT 125 MG
2 TABLET ORAL PRN
Status: DISCONTINUED | OUTPATIENT
Start: 2025-06-27 | End: 2025-07-03 | Stop reason: HOSPADM

## 2025-06-27 RX ORDER — IBUPROFEN 800 MG/1
800 TABLET, FILM COATED ORAL 3 TIMES DAILY PRN
Status: ON HOLD
Start: 2025-06-27 | End: 2025-07-02

## 2025-06-27 RX ORDER — HYDRALAZINE HYDROCHLORIDE 25 MG/1
25 TABLET, FILM COATED ORAL EVERY 8 HOURS PRN
Status: DISCONTINUED | OUTPATIENT
Start: 2025-06-27 | End: 2025-07-03 | Stop reason: HOSPADM

## 2025-06-27 RX ORDER — ENOXAPARIN SODIUM 100 MG/ML
40 INJECTION SUBCUTANEOUS DAILY
Status: ON HOLD | DISCHARGE
Start: 2025-06-27 | End: 2025-07-02

## 2025-06-27 RX ADMIN — ACETAMINOPHEN 1000 MG: 500 TABLET ORAL at 17:08

## 2025-06-27 RX ADMIN — DOCUSATE SODIUM 50MG AND SENNOSIDES 8.6MG 2 TABLET: 8.6; 5 TABLET, FILM COATED ORAL at 20:19

## 2025-06-27 RX ADMIN — ESCITALOPRAM OXALATE 10 MG: 10 TABLET ORAL at 20:19

## 2025-06-27 RX ADMIN — OXYCODONE HYDROCHLORIDE 10 MG: 10 TABLET ORAL at 20:20

## 2025-06-27 RX ADMIN — FOLIC ACID 1 MG: 1 TABLET ORAL at 04:26

## 2025-06-27 RX ADMIN — Medication 5000 UNITS: at 04:26

## 2025-06-27 RX ADMIN — OXYCODONE HYDROCHLORIDE 10 MG: 10 TABLET ORAL at 17:08

## 2025-06-27 RX ADMIN — KETOROLAC TROMETHAMINE 15 MG: 15 INJECTION, SOLUTION INTRAMUSCULAR; INTRAVENOUS at 04:27

## 2025-06-27 RX ADMIN — OXYCODONE HYDROCHLORIDE 10 MG: 10 TABLET ORAL at 10:29

## 2025-06-27 RX ADMIN — GABAPENTIN 100 MG: 100 CAPSULE ORAL at 04:25

## 2025-06-27 RX ADMIN — GABAPENTIN 100 MG: 100 CAPSULE ORAL at 13:04

## 2025-06-27 RX ADMIN — ACETAMINOPHEN 1000 MG: 500 TABLET ORAL at 00:00

## 2025-06-27 RX ADMIN — ACETAMINOPHEN 1000 MG: 500 TABLET ORAL at 23:09

## 2025-06-27 RX ADMIN — ACETAMINOPHEN 1000 MG: 500 TABLET ORAL at 04:26

## 2025-06-27 RX ADMIN — OXYCODONE HYDROCHLORIDE 10 MG: 10 TABLET ORAL at 23:09

## 2025-06-27 RX ADMIN — ENOXAPARIN SODIUM 40 MG: 100 INJECTION SUBCUTANEOUS at 17:08

## 2025-06-27 RX ADMIN — OXYCODONE HYDROCHLORIDE 10 MG: 10 TABLET ORAL at 13:55

## 2025-06-27 RX ADMIN — THERA TABS 1 TABLET: TAB at 04:26

## 2025-06-27 RX ADMIN — Medication 100 MG: at 04:26

## 2025-06-27 RX ADMIN — OMEPRAZOLE 40 MG: 20 CAPSULE, DELAYED RELEASE ORAL at 04:26

## 2025-06-27 RX ADMIN — ACETAMINOPHEN 1000 MG: 500 TABLET ORAL at 13:04

## 2025-06-27 RX ADMIN — GABAPENTIN 100 MG: 100 CAPSULE ORAL at 20:19

## 2025-06-27 ASSESSMENT — PATIENT HEALTH QUESTIONNAIRE - PHQ9
2. FEELING DOWN, DEPRESSED, IRRITABLE, OR HOPELESS: NOT AT ALL
1. LITTLE INTEREST OR PLEASURE IN DOING THINGS: NOT AT ALL
2. FEELING DOWN, DEPRESSED, IRRITABLE, OR HOPELESS: NOT AT ALL
2. FEELING DOWN, DEPRESSED, IRRITABLE, OR HOPELESS: NOT AT ALL
SUM OF ALL RESPONSES TO PHQ9 QUESTIONS 1 AND 2: 0
1. LITTLE INTEREST OR PLEASURE IN DOING THINGS: NOT AT ALL
1. LITTLE INTEREST OR PLEASURE IN DOING THINGS: NOT AT ALL

## 2025-06-27 ASSESSMENT — PAIN DESCRIPTION - PAIN TYPE
TYPE: ACUTE PAIN
TYPE: ACUTE PAIN
TYPE: SURGICAL PAIN

## 2025-06-27 ASSESSMENT — FIBROSIS 4 INDEX: FIB4 SCORE: 0.96

## 2025-06-27 ASSESSMENT — LIFESTYLE VARIABLES
ALCOHOL_USE: NO
EVER_SMOKED: YES

## 2025-06-27 NOTE — DISCHARGE SUMMARY
Discharge Summary    CHIEF COMPLAINT ON ADMISSION  Chief Complaint   Patient presents with    Ankle Injury       Reason for Admission  foot pain    Admission Date  6/23/2025     CODE STATUS  Full Code    HPI & HOSPITAL COURSE  Linda Carpenter is a 29 y.o. female with alcoholism, cirrhosis, and GERD, admitted 6/23/2025 with fall while camping with friends.  She tripped and fell without any loss of consciousness, dizziness, or palpitations.  On evaluation, x-ray showed close left tibia fracture.  Blood pressure was elevated.  Labs showed WBC of 13,500.  Electrolytes and renal function were normal.  AST was 47, with normal bilirubin.  Patient was placed on analgesics.  Orthopedics was consulted who recommended surgical repair.  Patient admitted to drinking heavily prior to admission, and was noted to have tremors and anxiety on admission.  She was placed on CIWA protocol. Ultrasound showed normal gallbladder and right upper quadrant ultrasound with normal liver contour with no evidence of solid mass lesion. Patient had ORIF of the left distal tibia pilon fracture with 4.0 cannulated screws and fixation of the left tibial shaft fracture with intramedullary nail on  6/24/2025.  Orthopedics recommended TTWB left lower extremity with motion as tolerated weightbearing, no further pending orthopedic procedures and cleared for discharge from orthopedic standpoint pending therapy recommendations and medical optimization.  Alcohol withdrawal subsided and resolved.  Her pain control was optimized. PT and OT recommended postacute placement.  She was evaluated by PM&R who deemed her a candidate for IRF.    She remained hemodynamically stable and afebrile.  Her hemoglobin remained stable.  Electrolytes and renal function remained normal. I have personally seen and examined the patient on the day of discharge. With her clinical improvement, she was deemed ready to discharge from the hospital as she did not have any further  hospitalization needs. Patient felt comfortable going to acute rehab. The discharge plan was discussed with the patient, with which she was agreeable to.     Therefore, she is discharged in good and stable condition to an inpatient rehabilitation hospital.    The patient met 2-midnight criteria for an inpatient stay at the time of discharge.      FOLLOW UP ITEMS POST DISCHARGE  -Continue skilled therapies at the acute rehab hospital. TTWB on LLE.  - Continue to optimize pain control.  - Continue bowel regimen.  - Continue pharmacologic DVT prophylaxis with Lovenox SQ.  - Outpatient follow-up with orthopedics.  - IRF physician to follow for continued management of ongoing/chronic medical issues.   - counseled to seek immediate medical attention, or return to the ED for recurrent or worsening symptoms.      DISCHARGE DIAGNOSES  Principal Problem:    Fracture of left tibia (POA: Yes)  Active Problems:    Macrocytic anemia (POA: Yes)    Alcohol withdrawal syndrome without complication (HCC) (POA: Yes)    Vitamin D insufficiency (POA: Yes)    Cirrhosis (HCC) (POA: Yes)    Gastroesophageal reflux disease (POA: Yes)  Resolved Problems:    * No resolved hospital problems. *      FOLLOW UP  No future appointments.  Nahum Alcocer M.D.  [address] [facility] [id number] [phone]    Schedule an appointment as soon as possible for a visit in 3 days      [address] [address] [phone]          MEDICATIONS ON DISCHARGE     Medication List        START taking these medications        Instructions   acetaminophen 500 MG Tabs  Start taking on: June 27, 2025  Commonly known as: Tylenol   Take 2 Tablets by mouth every 6 hours for 3 days, THEN 2 Tablets every 6 hours as needed for Mild Pain for up to 7 days.     enoxaparin 40 MG/0.4ML Sosy inj  Commonly known as: Lovenox   Inject 40 mg under the skin every day at 6 PM.  Dose: 40 mg     ibuprofen 800 MG Tabs  Commonly  known as: Motrin   Take 1 Tablet by mouth 3 times a day as needed for Moderate Pain.  Dose: 800 mg     polyethylene glycol/lytes 17 g Pack  Start taking on: June 28, 2025  Commonly known as: Miralax   Take 1 Packet by mouth every day.  Dose: 17 g     senna-docusate 8.6-50 MG Tabs  Commonly known as: Pericolace Or Senokot S   Take 2 Tablets by mouth every evening.  Dose: 2 Tablet     Vitamin D3 25 MCG Tabs  Start taking on: June 28, 2025   Take 5 Tablets by mouth every day.  Dose: 5,000 Units            CHANGE how you take these medications        Instructions   * oxyCODONE immediate-release 5 MG Tabs  What changed: how much to take  Commonly known as: Roxicodone   Take 1-2 Tablets by mouth every 6 hours as needed for Severe Pain for up to 7 days.  Dose: 5-10 mg           * This list has 1 medication(s) that are the same as other medications prescribed for you. Read the directions carefully, and ask your doctor or other care provider to review them with you.                CONTINUE taking these medications        Instructions   celecoxib 100 MG Caps  Commonly known as: CeleBREX   Take 100 mg by mouth 2 times a day as needed for Mild Pain or Inflammation.  Dose: 100 mg     escitalopram 10 MG Tabs  Commonly known as: Lexapro   Take 10 mg by mouth every evening.  Dose: 10 mg     gabapentin 100 MG Caps  Commonly known as: Neurontin   Take 100 mg by mouth 3 times a day.  Dose: 100 mg     pantoprazole 40 MG Tbec  Commonly known as: Protonix   Take 1 Tablet by mouth every day.  Dose: 40 mg     spironolactone 25 MG Tabs  Commonly known as: Aldactone   Take 25 mg by mouth 2 times a day.  Dose: 25 mg            ASK your doctor about these medications        Instructions   * oxyCODONE immediate-release 5 MG Tabs  Commonly known as: Roxicodone  Ask about: Should I take this medication?   Take 1 Tablet by mouth every 6 hours as needed for Severe Pain for up to 3 days.  Dose: 5 mg           * This list has 1 medication(s) that are  the same as other medications prescribed for you. Read the directions carefully, and ask your doctor or other care provider to review them with you.                  Allergies  Allergies[1]    DIET  Orders Placed This Encounter   Procedures    Diet Order Diet: Regular     Standing Status:   Standing     Number of Occurrences:   1     Diet::   Regular [1]       ACTIVITY  As tolerated and directed by rehab.  Touch-down weight bearing LEFT leg    LINES, DRAINS, AND WOUNDS  This is an automated list. Peripheral IVs will be removed prior to discharge.  Peripheral IV 06/23/25 20 G Left Antecubital (Active)   Site Assessment Clean;Dry;Intact 06/26/25 0800   Dressing Type Occlusive;Transparent 06/26/25 0800   Line Status Infusing 06/26/25 0800   Dressing Status Clean;Dry;Intact 06/26/25 0800   Dressing Intervention N/A 06/26/25 0800   Dressing Change Due 06/28/25 06/25/25 1940   Date Primary Tubing Changed 06/23/25 06/23/25 2025   Infiltration Grading ([facility], [facility]) 0 06/26/25 0800   Phlebitis Scale ([facility] Only) 0 06/26/25 0800     External Urinary Catheter (Female Wick) (Active)   Collection Container Suction container 06/26/25 0800      Wound 06/24/25 Surgical Knee Anterior Right surgical incision (Active)   Site Assessment Clean;Dry 06/27/25 1039   Periwound Assessment Clean;Dry;Intact 06/27/25 1039   Margins Attached edges 06/27/25 1039   Closure Staples 06/27/25 1039   Drainage Amount None 06/27/25 1039   Treatments Ice applied 06/24/25 1931   Dressing Status Clean;Dry;Intact 06/27/25 1039   Dressing Changed Changed 06/27/25 1039   Dressing Options Dry Gauze;Dry Roll Gauze;Ace Wrap 06/27/25 1039   Dressing Change/Treatment Frequency Every 48 hrs, and As Needed 06/27/25 1039   NEXT Dressing Change/Treatment Date 06/29/25 06/27/25 1039       Wound 06/24/25 Surgical Closed Surgical Incision Tibia Anterior Right surgical incision x 7 sites (Active)   Site Assessment Clean;Dry 06/27/25 1039   Periwound Assessment  Clean;Dry;Intact 06/27/25 1039   Margins Attached edges 06/27/25 1039   Closure Staples 06/27/25 1039   Drainage Amount None 06/27/25 1039   Treatments Ice applied 06/24/25 1931   Dressing Status Clean;Dry;Intact 06/27/25 1039   Dressing Changed Changed 06/27/25 1039   Dressing Options Dry Gauze;Dry Roll Gauze;Ace Wrap 06/27/25 1039   Dressing Change/Treatment Frequency Every 48 hrs, and As Needed 06/27/25 1039   NEXT Dressing Change/Treatment Date 06/29/25 06/27/25 1039       Peripheral IV 06/23/25 20 G Left Antecubital (Active)   Site Assessment Clean;Dry;Intact 06/26/25 0800   Dressing Type Occlusive;Transparent 06/26/25 0800   Line Status Infusing 06/26/25 0800   Dressing Status Clean;Dry;Intact 06/26/25 0800   Dressing Intervention N/A 06/26/25 0800   Dressing Change Due 06/28/25 06/25/25 1940   Date Primary Tubing Changed 06/23/25 06/23/25 2025   Infiltration Grading ([facility], [facility]) 0 06/26/25 0800   Phlebitis Scale ([facility] Only) 0 06/26/25 0800               MENTAL STATUS ON TRANSFER     AOx3          CONSULTATIONS  Orthopedics    PROCEDURES  As above    LABORATORY  Lab Results   Component Value Date    SODIUM 138 06/27/2025    POTASSIUM 3.7 06/27/2025    CHLORIDE 108 06/27/2025    CO2 21 06/27/2025    GLUCOSE 90 06/27/2025    BUN 7 (L) 06/27/2025    CREATININE 0.72 06/27/2025        Lab Results   Component Value Date    WBC 8.6 06/27/2025    HEMOGLOBIN 9.7 (L) 06/27/2025    HEMATOCRIT 29.3 (L) 06/27/2025    PLATELETCT 261 06/27/2025        Total time of the discharge process = 38 minutes.       [1]   Allergies  Allergen Reactions    Cefaclor Unspecified     RXN= as baby (increased symptoms)    Sulfamethoxazole W-Trimethoprim Rash     Rash

## 2025-06-27 NOTE — FLOWSHEET NOTE
06/27/25 1652   Protocol Assessment   Reassessment Assessment Yes   Patient History   Pulmonary Diagnosis none   Procedures Relevant to Respiratory Status none   Home O2 No   Nocturnal CPAP No   Home Treatments/Frequency No   Sleep Apnea Screening   Have you had a sleep study? No   Have you been diagnosed with sleep apnea? No   S - Have you been told that you SNORE? 0   T - Are you often TIRED during the day? 0   O - Do you know if you stop breathing or has anyone witnessed you stop breathing while you were asleep? (OBSTRUCTION) 0   P - Do you have high blood PRESSURE or on medication to control high blood pressure? 1   B - Is your Body Mass Index greater than 35? (BMI) 0   A - Are you 50 years old or older? (AGE) 0   N - Are you a male with a NECK circumference greater than 17 inches, or a female with a neck circumference greater than 16 inches? 0   G - Are you male? (GENDER) 0   Stop Bang Total Score 1   COPD Risk Screening   Do you have a history of COPD? No

## 2025-06-27 NOTE — CARE PLAN
The patient is Stable - Low risk of patient condition declining or worsening    Shift Goals  Clinical Goals: pain control, safety, mobility  Patient Goals: rest, pain control  Family Goals: not present    Progress made toward(s) clinical / shift goals:  yes      Problem: Pain - Standard  Goal: Alleviation of pain or a reduction in pain to the patient’s comfort goal  Description: Target End Date:  Prior to discharge or change in level of care    Document on Vitals flowsheet    1.  Document pain using the appropriate pain scale per order or unit policy  2.  Educate and implement non-pharmacologic comfort measures (i.e. relaxation, distraction, massage, cold/heat therapy, etc.)  3.  Pain management medications as ordered  4.  Reassess pain after pain med administration per policy  5.  If opiods administered assess patient's response to pain medication is appropriate per POSS sedation scale  6.  Follow pain management plan developed in collaboration with patient and interdisciplinary team (including palliative care or pain specialists if applicable)  Outcome: Progressing     Problem: Knowledge Deficit - Standard  Goal: Patient and family/care givers will demonstrate understanding of plan of care, disease process/condition, diagnostic tests and medications  Description: Target End Date:  1-3 days or as soon as patient condition allows    Document in Patient Education    1.  Patient and family/caregiver oriented to unit, equipment, visitation policy and means for communicating concern  2.  Complete/review Learning Assessment  3.  Assess knowledge level of disease process/condition, treatment plan, diagnostic tests and medications  4.  Explain disease process/condition, treatment plan, diagnostic tests and medications  Outcome: Progressing     Problem: Seizure Precautions  Goal: Implementation of seizure precautions  Description: Target End Date:  Prior to discharge or change in level of care    1.  Padded side rails up at  all times  2.  Suction equipment and oxygen delivery system at bedside  3.  Continuous pulse oximeter in use  4.  Implement fall precautions, bed alarm on, bed in lowest position  5.  IV access (per order)  6.  Provide low stimulus environment, avoid exposure to triggers  7.  Instruct patient to use call light/seizure button if having warning signs of impending seizure  Outcome: Progressing     Problem: Lifestyle Changes  Goal: Patient's ability to identify lifestyle changes and available resources to help reduce recurrence of condition will improve  Description: Target End Date:  1 to 3 days    1.  Discuss recommended lifestyle changes  2.  Identify available resources and support systems  3.  Consider referral to substance abuse program  Outcome: Progressing     Problem: Psychosocial  Goal: Patient's level of anxiety will decrease  Description: Target End Date:  1-3 days or as soon as patient condition allows    1.  Collaborate with patient and family/caregiver to identify triggers and develop strategies to cope with anxiety  2.  Implement stimuli reduction, calming techniques  3.  Pharmacologic management per provider order  4.  Encourage patient/family/care giver participation  5.  Collaborate with interdisciplinary team including Psychologist or Behavioral Health Team as needed  Outcome: Progressing  Goal: Spiritual and cultural needs incorporated into hospitalization  Description: Target End Date:  End of day 1    1.  Encourage verbalization of feelings, concerns, expectations and needs  2.  Collaborate with Case Management/  3.  Collaborate with Pastoral Care to meet spiritual needs  Outcome: Progressing     Problem: Risk for Aspiration  Goal: Patient's risk for aspiration will be absent or decrease  Description: Target End Date:  Prior to discharge or change in level of care    1.   Complete dysphagia screening on admission  2.   NPO until dysphagia screening complete or medically cleared  3.    Collaborate with Speech Therapy, Clinical Dietitian and interdisciplinary team  4.   Implement aspiration precautions  5.   Assist patient up to chair for meals  6.   Elevate head of bed 90 degrees if patient is unable to get out of bed  7.   Encourage small bites  8.   Ensure foods/liquids are of appropriate consistency  9.   Assess for any signs/symptoms of aspiration  10. Assess breath sounds and vital signs after oral intake  Outcome: Progressing     Problem: Skin Integrity  Goal: Skin integrity is maintained or improved  Description: Target End Date:  Prior to discharge or change in level of care    Document interventions on Skin Risk/Carmelo flowsheet groups and corresponding LDA    1.  Assess and monitor skin integrity, appearance and/or temperature  2.  Assess risk factors for impaired skin integrity and/or pressures ulcers  3.  Implement precautions to protect skin integrity in collaboration with interdisciplinary team  4.  Implement pressure ulcer prevention protocol if at risk for skin breakdown  5.  Confirm wound care consult if at risk for skin breakdown  6.  Ensure patient use of pressure relieving devices  (Low air loss bed, waffle overlay, heel protectors, ROHO cushion, etc)  Outcome: Progressing

## 2025-06-27 NOTE — PROGRESS NOTES
"    Orthopedic PA Progress Note    Interval changes:  Patient doing well. Plan for dc to rehab today  PRAFO in place  LLE dressings are CDI- changed by RN prior to dc due to patient requesting later change time than during rounding hours  TTWB LLE  No pending ortho procedures  Cleared for DC from orthopedic standpoint     ROS - Patient denies any new issues.  Denies any numbness or tingling. Pain controlled.    /70   Pulse 76   Temp 36.4 °C (97.5 °F) (Temporal)   Resp 15   Ht 1.651 m (5' 5\")   Wt 75.3 kg (166 lb)   SpO2 95%     Patient seen and examined  No acute distress  Breathing non labored  RRR  LLE: Surgical dressing is clean, dry, and intact. Patient clearly fires tibialis anterior, EHL, and gastrocnemius/soleus. Sensation is intact to light touch throughout superficial peroneal, deep peroneal, tibial, saphenous, and sural nerve distributions. Strong and palpable 2+ dorsalis pedis and posterior tibial pulses with capillary refill less than 2 seconds.     Recent Labs     06/25/25  0101 06/26/25  0156 06/27/25  0741   WBC 8.6 10.5 8.6   RBC 2.75* 2.51* 2.62*   HEMOGLOBIN 10.0* 9.1* 9.7*   HEMATOCRIT 30.4* 27.9* 29.3*   .5* 111.2* 111.8*   MCH 36.4* 36.3* 37.0*   MCHC 32.9 32.6 33.1   RDW 78.4* 78.5* 79.7*   PLATELETCT 295 251 261   MPV 10.1 10.1 10.6       Active Hospital Problems    Diagnosis     Vitamin D insufficiency [E55.9]      Priority: Medium    Alcohol withdrawal syndrome without complication (HCC) [F10.930]     Fracture of left tibia [S82.202A]     Gastroesophageal reflux disease [K21.9]     Macrocytic anemia [D53.9]     Cirrhosis (HCC) [K74.60]        DX-PORTABLE FLUORO > 1 HOUR   Final Result      Portable fluoroscopy utilized for 2 minutes 10 seconds.         INTERPRETING LOCATION: [address] [place] [address], [facility]      DX-TIBIA AND FIBULA LEFT   Final Result      Digitized intraoperative radiograph is submitted for review. This examination is not for diagnostic purpose but for " guidance during a surgical procedure. Please see the patient's chart for full procedural details.         INTERPRETING LOCATION: [address] [facility], [place], [address]      US-RUQ   Final Result         1. Normal gallbladder and right upper quadrant ultrasound.      [id number] W/O PLUS RECONS LEFT   Final Result      1.  Mildly displaced, comminuted, intra-articular fracture of the distal tibia extending to the distal tibial articular surface.   2.  Small ankle joint effusion.      DX-ANKLE 3+ VIEWS LEFT   Final Result      Comminuted intra-articular fracture of the distal left tibia with regional soft tissue swelling.      DX-TIBIA AND FIBULA LEFT   Final Result      Acute closed nondisplaced intra-articular spiral fracture of the distal left tibia. Regional soft tissue swelling.          Assessment/Plan:  Patient doing well. Plan for dc to rehab today  PRAFO in place  LLE dressings are CDI- changed by RN prior to dc due to patient requesting later change time than during rounding hours  TTWB LLE  No pending ortho procedures  Cleared for DC from orthopedic standpoint     POD#3 S/p  1.  Open reduction internal fixation left distal tibia pilon fracture with 4.0 cannulated screws  2.  Fixation left tibial shaft fracture with intramedullary nail  Wt bearing status - TTWB LLE  Future Procedures - None planned   Wound care/Drains - Dressings to be changed every other day by nursing. Or PRN for saturation starting POD#2  Sutures/Staples out- 14-21 days post operatively. Removal will completed by ortho ZINA's unless transferred.  Inpatient DVT prophylaxis: Lovenox initiated 6/24  DVT Prophylaxis outpatient: ASA 81 mg PO BID x4 weeks  PT/OT-initiated  Antibiotics:  Perioperative completed  DVT Prophylaxis- TEDS/SCDs/Foot pumps  Case Coordination for Discharge Planning - Disposition per therapy recs.   Follow up with Dr. Mcdaniel 2 weeks following final surgery for repeat imaging and wound check. (Est follow up date 7/8)

## 2025-06-27 NOTE — PROGRESS NOTES
Pt. discharged to [facility]. Pt left unit via WC with GMT.   IV pulled out.   Reviewed discharge instructions, follow up appointments, and prescription with the patient. Patient states understanding of all the instructions. Discharge instructions, and personal belongings with patient upon leaving.

## 2025-06-27 NOTE — DISCHARGE PLANNING
0824: Pending auth from Medicaid, [facility] will continue to follow.    1045: Medicaid authorized, forwarded PAS to Dr Rausch to review.    1106: Pt accepted by Dr Rausch at [facility]. Transport set for 1630/1700 GMT wheelchair, nurse report [id number]. Notified care team.     1231: Transport changed to 1530, notified patient. Patient agreeable to IPR, understands estimated LOS.

## 2025-06-27 NOTE — CARE PLAN
The patient is Stable - Low risk of patient condition declining or worsening    Shift Goals  Clinical Goals: pain management, dc today  Patient Goals: pain management, dc today  Family Goals: not present    Progress made toward(s) clinical / shift goals:  Patient has been medicated for pain per MAR. Updated on POC. Pt verbalized understanding. Dressings changed per order. Plan to dc the patient to Rehab today.       Problem: Pain - Standard  Goal: Alleviation of pain or a reduction in pain to the patient’s comfort goal  Description: Target End Date:  Prior to discharge or change in level of care    Document on Vitals flowsheet    1.  Document pain using the appropriate pain scale per order or unit policy  2.  Educate and implement non-pharmacologic comfort measures (i.e. relaxation, distraction, massage, cold/heat therapy, etc.)  3.  Pain management medications as ordered  4.  Reassess pain after pain med administration per policy  5.  If opiods administered assess patient's response to pain medication is appropriate per POSS sedation scale  6.  Follow pain management plan developed in collaboration with patient and interdisciplinary team (including palliative care or pain specialists if applicable)  Outcome: Progressing     Problem: Knowledge Deficit - Standard  Goal: Patient and family/care givers will demonstrate understanding of plan of care, disease process/condition, diagnostic tests and medications  Description: Target End Date:  1-3 days or as soon as patient condition allows    Document in Patient Education    1.  Patient and family/caregiver oriented to unit, equipment, visitation policy and means for communicating concern  2.  Complete/review Learning Assessment  3.  Assess knowledge level of disease process/condition, treatment plan, diagnostic tests and medications  4.  Explain disease process/condition, treatment plan, diagnostic tests and medications  Outcome: Progressing

## 2025-06-27 NOTE — PROGRESS NOTES
Virtual Nurse environmental rounding complete. Patient appears to be sleeping. Visualized equal rise and fall of chest.     Round Needs: No needs at this time.

## 2025-06-27 NOTE — PROGRESS NOTES
Post surgery education on the prevention of surgical site infection, pulmonary embolism, and deep vein thrombosis provided to the patient. Patient verbalized understanding. Flyer provided.

## 2025-06-27 NOTE — H&P
"  Physical Medicine & Rehabilitation  History and Physical (H&P)  &     Post Admission Physician Evaluation (AMBER)       Date of Admission: 6/27/2025   Date of Service: 6/28/2025   Linda Carpenter  Room/bed 17-02    [facility] Code to Support Admission: 0008.9 - Orthopaedic Disorders: Other Orthopaedic  Etiologic Diagnosis: Fracture of left tibia    _______________________________________________    Chief Complaint: Leg Fracture    History of Present Illness:  Adapted from the PM&R Consult by Dr. Lobato:   \"HPI: The patient is a 29 y.o.  female with a past medical history of alcoholism, liver cirrhosis, GERD who presented on 6/23/2025  1:49 PM after GLF. XR showed distal L tibia fracture.  Patient admitted to drinking heavily prior to admission and was found to have tremors and anxiety.  She was placed on CIWA protocol.  Patient was evaluated by orthopedic surgery, and taken to the OR on 6/24/2025 for ORIF by Rashaun Mcdaniel MD.  TTWB LLE. Current labs remarkable for Hgb 9.1, K3.5. Vitals remarkable for intermittent hypertension.  Patient seen by PT/OT with recommendation for post-acute placement. PM&R consulted to evaluate for possible inpatient rehab admission.\"    On admission the patient is that overall she is doing fine.  She notes pain in her left leg is not adequately controlled with oxycodone 10 mg.  She cannot sleep well with due to her pain.  She feels that her pain limits her participation in therapies.  She notes side effect of constipation.  She denies fevers or chills.     Review of Systems:     14 point ROS reviewed and negative except as stated above.      Past Medical History:  Past Medical History[1]    Past Surgical History:  Past Surgical History[2]    Family History:  Family History   Problem Relation Age of Onset    Other Mother         fribromyalgia     Other Father         kidney failure    Asthma Brother        Medications:  No current facility-administered medications for this encounter. "     Current Outpatient Medications   Medication    acetaminophen (TYLENOL) 500 MG Tab    enoxaparin (LOVENOX) 40 MG/0.4ML Solution Prefilled Syringe inj    ibuprofen (MOTRIN) 800 MG Tab    oxyCODONE immediate-release (ROXICODONE) 5 MG Tab    [START ON 6/28/2025] polyethylene glycol/lytes (MIRALAX) 17 g Pack    senna-docusate (PERICOLACE OR SENOKOT S) 8.6-50 MG Tab    [START ON 6/28/2025] Cholecalciferol (VITAMIN D3) 25 MCG Tab     Facility-Administered Medications Ordered in Other Encounters   Medication Dose    sodium phosphate enema 1 Enema  1 Enema    polyethylene glycol/lytes (Miralax) Packet 1 Packet  1 Packet    senna-docusate (Pericolace Or Senokot S) 8.6-50 MG per tablet 2 Tablet  2 Tablet    And    polyethylene glycol/lytes (Miralax) Packet 1 Packet  1 Packet    magnesium hydroxide (Milk Of Magnesia) suspension 15 mL  15 mL    bisacodyl (Dulcolax) suppository 10 mg  10 mg    lactulose solution 10 g  15 mL    vitamin D3 (Cholecalciferol) tablet 5,000 Units  5,000 Units    acetaminophen (Tylenol) tablet 1,000 mg  1,000 mg    Followed by    [START ON 6/29/2025] acetaminophen (Tylenol) tablet 1,000 mg  1,000 mg    ibuprofen (Motrin) tablet 800 mg  800 mg    oxyCODONE immediate-release (Roxicodone) tablet 5 mg  5 mg    Or    oxyCODONE immediate release (Roxicodone) tablet 10 mg  10 mg    Or    HYDROmorphone (Dilaudid) injection 0.5 mg  0.5 mg    gabapentin (Neurontin) capsule 100 mg  100 mg    omeprazole (PriLOSEC) capsule 40 mg  40 mg    NS infusion      enoxaparin (Lovenox) inj 40 mg  40 mg    labetalol (Normodyne/Trandate) injection 10 mg  10 mg    ondansetron (Zofran) syringe/vial injection 4 mg  4 mg    ondansetron (Zofran ODT) dispertab 4 mg  4 mg    promethazine (Phenergan) tablet 12.5-25 mg  12.5-25 mg    promethazine (Phenergan) suppository 12.5-25 mg  12.5-25 mg    prochlorperazine (Compazine) injection 5-10 mg  5-10 mg    LORazepam (Ativan) tablet 0.5 mg  0.5 mg    LORazepam (Ativan) tablet 1 mg  1 mg     LORazepam (Ativan) tablet 2 mg  2 mg    LORazepam (Ativan) tablet 3 mg  3 mg    LORazepam (Ativan) tablet 4 mg  4 mg    Or    diazePAM (Valium) injection 10 mg  10 mg    thiamine (Vitamin B-1) tablet 100 mg  100 mg    And    folic acid (Folvite) tablet 1 mg  1 mg    And    multivitamin tablet 1 Tablet  1 Tablet    escitalopram (Lexapro) tablet 10 mg  10 mg       Allergies:  Cefaclor and Sulfamethoxazole w-trimethoprim    Psychosocial History:  Housing / Facility: [address] [facility]  Steps Into Home: 4  Lives with - Patient's Self Care Capacity: Significant Other  Equipment Owned: Raised Toilet Seat With Arms, Crutches    Level of Function Prior to Disability:  Housing / Facility: [address] [facility]  Steps Into Home: 4  Rail: Both Rail (Steps into Home) (too wide to grab both at the same time)  Bathroom Set up: Bathtub / Shower Combination  Equipment Owned: Raised Toilet Seat With Arms, Crutches  Lives with - Patient's Self Care Capacity: Significant Other  Bed Mobility: Independent  Transfer Status: Independent  Ambulation: Independent  Assistive Devices Used: None  Stairs: Independent    Self Feeding: Independent  Grooming / Hygiene: Independent  Bathing: Independent  Dressing: Independent  Toileting: Independent  Medication Management: Independent  Laundry: Independent  Kitchen Mobility: Independent  Finances: Independent  Home Management: Independent  Shopping: Independent  Prior Level Of Mobility: Independent Without Device in Home, Independent Without Device in Community  Driving / Transportation: Relatives / Others Provide Transportation  Prior Services: Home-Independent  Housing / Facility: [facility]  Occupation (Pre-Hospital Vocational): Homemaker    Level of Function Prior to Admission to [facility]:  Gait Level Of Assist: Contact Guard Assist  Assistive Device: Front Wheel Walker  Distance (Feet): 2  Deviation:  (swing to, poor R foot clearance. decreased R step length)  Weight Bearing  Status: LLE TTWB  Supine to Sit: Standby Assist  Sit to Supine: Standby Assist  Scooting: Standby Assist  Comments: HOB elevated, no rail, use of UEs to move the LLE  Sit to Stand: Contact Guard Assist  Bed, Chair, Wheelchair Transfer: Contact Guard Assist (posterior LOB)  Transfer Method: Stand Step  Sitting in Chair: post session    Eating: Supervision  Lower Body Dressing: Moderate Assist (threading LLE; don underwear in supine)  Toileting: Maximal Assist (purewick and bedpan)    CURRENT LEVEL OF FUNCTION:   Same as level of function prior to admission to [facility]    Physical Examination:     VITAL SIGNS:   vitals were not taken for this visit.   GENERAL: No apparent distress  HEENT: Normocephalic/atraumatic, EOMI, PERRL, and No nystagmus  CARDIAC: Regular rate and rhythm  LUNGS: Clear to auscultation   ABDOMINAL: bowel sounds present, soft, nontender, and nondistended    EXTREMITIES: no contractures, spasticity, or edema.  No calf tenderness bilaterally, 2+ DP/PT pulses on the right, not tested on the left  NEURO:  Mental status:  A&Ox4 (person, place, date, situation) answers questions appropriately follows commands  Speech: fluent, no aphasia or dysarthria    Motor Exam Upper Extremities   ? Myotome R L   Shoulder Abduction C5 5 5   Elbow flexion C5 5 5   Wrist extension C6 5 5   Elbow extension C7 5 5   Finger flexion C8 5 5   Finger abduction T1 5 5     Motor Exam Lower Extremities  ? Myotome R L   Hip flexion L2 1* 5   Knee extension L3 1* 5   Ankle dorsiflexion L4 NT 5   Toe extension L5 NT 5   Ankle plantarflexion S1 NT 5     *Pain limited    FFN and FTN intact  HTS intact on right    Radiology:  6/23/2025: CT tibia and fibula left  IMPRESSION:  1.  Mildly displaced, comminuted, intra-articular fracture of the distal tibia extending to the distal tibial articular surface.  2.  Small ankle joint effusion.     6/24/2025: US RUQ  Impression: normal gallbladder and right upper quadrant  ultrasound.       Laboratory Values:  Recent Labs     06/25/25  0101 06/26/25  0156 06/27/25  0741   SODIUM 136 135 138   POTASSIUM 4.1 3.5* 3.7   CHLORIDE 103 103 108   CO2 21 25 21   GLUCOSE 136* 109* 90   BUN 9 8 7*   CREATININE 0.96 0.79 0.72   CALCIUM 8.0* 7.7* 8.2*     Recent Labs     06/25/25  0101 06/26/25  0156 06/27/25  0741   WBC 8.6 10.5 8.6   RBC 2.75* 2.51* 2.62*   HEMOGLOBIN 10.0* 9.1* 9.7*   HEMATOCRIT 30.4* 27.9* 29.3*   .5* 111.2* 111.8*   MCH 36.4* 36.3* 37.0*   MCHC 32.9 32.6 33.1   RDW 78.4* 78.5* 79.7*   PLATELETCT 295 251 261   MPV 10.1 10.1 10.6           Primary Rehabilitation Diagnosis:    This patient is a 29 y.o. female admitted for acute inpatient rehabilitation with Fracture of left tibia.    Impairments:   ADLs/IADLs  Mobility    Secondary Diagnosis/Medical Co-morbidities Affecting Function  Hypokalemia, leg fracture, acute pain, GERD, cirrhosis, alcoholism, ABLA     Relevant Changes Since Preadmission Evaluation:    Status unchanged    The patient's rehabilitation potential is Excellent  The patient's medical prognosis is excellent    Rehabilitation Plan:   Discussion and Recommendations:   1. The patient requires an acute inpatient rehabilitation program with a coordinated program of care at an intensity and frequency not available at a lower level of care. This recommendation is substantiated by the patient's medical physicians who recommend that the patient's intervention and assessment of medical issues needs to be done at an acute level of care for patient's safety and maximum outcome.   2. A coordinated program of care will be supplied by an interdisciplinary team of physical therapy, occupational therapy, rehab physician, rehab nursing, and, if needed, speech therapy and rehab psychology. Rehab team presents a patient-specific rehabilitation and education program concentrating on prevention of future problems related to accessibility, mobility, skin, bowel, bladder,  sexuality, and psychosocial and medical/surgical problems.   3. Need for Rehabilitation Physician: The rehab physician will be evaluating the patient on a multi-weekly basis to help coordinate the program of care. The rehab physician communicates between medical physicians, therapists, and nurses to maximize the patient's potential outcome. Specific areas in which the rehab physician will be providing daily assessment include the following:   A. Assessing the patient's heart rate and blood pressure response (vitals monitoring) to activity and making adjustments in medications or conservative measures as needed.   B. The rehab physician will be assessing the frequency at which the program can be increased to allow the patient to reach optimal functional outcome.   C. The rehab physician will also provide assessments in daily skin care, especially in light of patient's impairments in mobility.   D. The rehab physician will provide special expertise in understanding how to work with functional impairment and recommend appropriate interventions, compensatory techniques, and education that will facilitate the patient's outcome.   4. Rehab R.N.   The rehab RN will be working with patient to carry over in room mobility and activities of daily living when the patient is not in 3 hours of skilled therapy. Rehab nursing will be working in conjunction with rehab physician to address all the medical issues above and continue to assess laboratory work and discuss abnormalities with the treating physicians, assess vitals, and response to activity, and discuss and report abnormalities with the rehab physician. Rehab RN will also continue daily skin care, supervise bladder/bowel program, instruct in medication administration, and ensure patient safety.   5. Rehab Therapy: Therapies to treat at intensity and frequency of (may change after completion of evaluation by all therapeutic disciplines):       PT:  Physical therapy to  address mobility, transfer, gait training and evaluation for adaptive equipment needs 1.5 hour/day at least 5 days/week for the duration of the ELOS (see below)       OT:  Occupational therapy to address ADLs, self-care, home management training, functional mobility/transfers and assistive device evaluation, and community re-integration 1.5 hour/day at least 5 days/week for the duration of the ELOS (see below).        ST/Dysphagia:  Speech therapy to address speech, language, and cognitive deficits as well as swallowing difficulties with retraining/dysphagia management and community re-integration with comprehension, expression, cognitive training 0 hour/day at least 5 days/week for the duration of the ELOS (see below).     Medical management / Rehabilitation Issues/ Adverse Potential as part of rehabilitation plan     Rehabilitation Issues/Adverse Potential  1.  Left Leg Fracture: Patient demonstrates functional deficits in strength, balance, coordination, and ADL's. Patient is admitted to [facility] for comprehensive rehabilitation therapy as described below.   Rehabilitation nursing monitors bowel and bladder control, educates on medication administration, co-morbidities and monitors patient safety.    2.  Neurostimulants: None at this time but continue to assess daily for need to initiate should status change.    3.  DVT prophylaxis:  Patient is on Lovenox for anticoagulation upon transfer. Encourage OOB. Monitor daily for signs and symptoms of DVT including but not limited to swelling and pain to prevent the development of DVT that may interfere with therapies.    4.  GI prophylaxis:  On prilosec to prevent gastritis/dyspepsia which may interfere with therapies.    5.  Pain: Controlled with Tylenol     6.  Nutrition/Dysphagia: Dietician monitors nutrient intake, recommend supplements prn and provide nutrition education to pt/family to promote optimal nutrition for wound healing/recovery.      7.  Bladder/bowel:  Start bowel and bladder program as described below, to prevent constipation, urinary retention (which may lead to UTI), and urinary incontinence (which will impact upon pt's functional independence).   - TV Q3h while awake with post void bladder scans, I&O cath for PVRs >400  - up to commode after meal     8.  Skin/dermal ulcer prophylaxis: Monitor for new skin conditions with q.2 h. turns as required to prevent the development of skin breakdown.     9.  Cognition/Behavior: As needed psychologist provides adjustment counseling to illness and psychosocial barriers that may be potential barriers to rehabilitation.     10. Respiratory therapy: RT performs O2 management prn, breathing retraining, pulmonary hygiene and bronchospasm management prn to optimize participation in therapies.     Medical Co-Morbidities/Adverse Potential Affecting Function:    L traumatic distal tibial fracture s/p ORIF with IMN Dr. Mcdaniel 6/20/25  PT and OT for mobility and ADLs. Per guidelines, 15 hours per week between PT, OT and/or SLP.  Follow-up Ortho  TTWB LLE    Pain - Scheduled Tylenol, Shan. S/p IV Ketorolac. PRN ibuprofen.  6/28 increase PRN Oxycodone to 10-15 mg every 3 hours as needed.  Discussed that this would only be increased for a few days, then would reduce oxycodone to a lower dose    EtOH Use + Chronic Tremors - Thiamine, folate, MVN    Mood Disorder - Continue Lexapro    Hypokalemia  -Potassium borderline low 3.5 on 6/28/2025.  Will replete with 20 milliequivalents potassium    ABLA + Macrocytic Anemia - hemoglobin 9.7 on 6/28/2025, stable    Vitamin D Deficiency -15 on 6/28/2025.  Continue vitamin D supplementation 5000 units daily    GERD - PPI    Bowel - Patient on Senna-docusate for constipation prophylaxis.     Bladder - TV/PVR/BS PRN      Upcoming Labs/imaging: Admission Labs     DVT PROPHYLAXIS: Lovenox --> aspirin x 4 weeks in OP setting     HOSPITALIST FOLLOWING: No    CODE STATUS: FULL      DISPO: Home with spouse     JIGAR: TBD    MEDS SENT TO: TBD    DISCHARGE SPECIALIST FOLLOW UP: Ortho (Jonas)    Patient to scheduled follow up with their PCP within 2 weeks from discharge from the [facility].     I personally performed a complete drug regimen review and no potential clinically significant medication issues were identified.     Goals/Expected Level of Function Based on Current Medical and Functional Status:  (may change based on patient's medical status and rate of impairment recovery)  Transfers:   Modified Independent  Mobility/Gait:   Modified Independent  ADL's:   Modified Independent    DISPOSITION: Discharge to pre-morbid independent living setting with the supportive care of patient's family.    ELOS: 7-10 days  ____________________________________    Dr. Damaris Regan MD  [facility] - Physical Medicine & Rehabilitation   ____________________________________    Pt was seen today for 75 min, and entire time spent in face-to-face contact was >50% in counseling and coordination of care as detailed in A/P above.             [1]   Past Medical History:  Diagnosis Date    Anemia 9/4/2024    Coagulopathy (HCC) 9/4/2024    Gastroesophageal reflux disease 9/4/2024   [2]   Past Surgical History:  Procedure Laterality Date    TIBIA NAILING INTRAMEDULLARY Left 6/24/2025    Procedure: INSERTION, INTRAMEDULLARY ZOEY, LEFT TIBIA;  Surgeon: Rashaun Mcdaniel M.D.;  Location: SURGERY [facility];  Service: Orthopedics    RI UPPER GI ENDOSCOPY,DIAGNOSIS N/A 9/4/2024    Procedure: GASTROSCOPY;  Surgeon: Giuliano Fountain M.D.;  Location: SURGERY SAME DAY [facility];  Service: Gastroenterology    RI COLONOSCOPY-FLEXIBLE N/A 9/4/2024    Procedure: COLONOSCOPY;  Surgeon: Giuliano Fountain M.D.;  Location: SURGERY SAME DAY [facility];  Service: Gastroenterology

## 2025-06-27 NOTE — PROGRESS NOTES
"Hospital Medicine Daily Progress Note    Date of Service  6/27/2025    Chief Complaint  Ankle injury    Hospital Course  Linda Carpenter is a 29 y.o. female with alcoholism, cirrhosis, and GERD, admitted 6/23/2025 with fall while camping with friends.  She tripped and fell without any loss of consciousness, dizziness, or palpitations.  On evaluation, x-ray showed close left tibia fracture.  Blood pressure was elevated.  Labs showed WBC of 13,500.  Electrolytes and renal function were normal.  AST was 47, with normal bilirubin.  Patient was placed on analgesics.  Orthopedics was consulted who recommended surgical repair.  Patient admitted to drinking heavily prior to admission, and was noted to have tremors and anxiety on admission.  She was placed on CIWA protocol. Ultrasound showed normal gallbladder and right upper quadrant ultrasound with normal liver contour with no evidence of solid mass lesion. Patient had ORIF of the left distal tibia pilon fracture with 4.0 cannulated screws and fixation of the left tibial shaft fracture with intramedullary nail on  6/24/2025.  Orthopedics recommended TTWB left lower extremity with motion as tolerated weightbearing, no further pending orthopedic procedures and cleared for discharge from orthopedic standpoint pending therapy recommendations and medical optimization.  Alcohol withdrawal subsided.  PT and OT recommended postacute placement.    Interval Problem Update  6/27/2025 - I reviewed the patient's chart today. Uneventful night. VSS. Afebrile. Saturating well on RA.  No leukocytosis.  Hemoglobin stable at 9.7.  Platelet count is normal.  Electrolytes and renal function are normal.  Potassium has normalized.  She is deemed a candidate for IPR, pending Medicaid authorization.     > I have personally seen and examined the patient today.  Had 2 bowel movements yesterday.  States her pain is \"so-so\".  No GI complaints.  Not short of breath.  Not tremulous.    I " personally reviewed all lab results mentioned above. Prior medical records from this institution and outside facilities were independently reviewed as noted. I also personally reviewed all ER physician and consultant recommendations and plans as documented above. History was independently obtained by myself. I have discussed this patient's plan of care and discharge plan at IDT rounds today with Case Management, Nursing, Nursing leadership, and other members of the IDT team.    Consultants/Specialty  orthopedics    Code Status  Full Code    Disposition  The patient is medically cleared for discharge to home or a post-acute facility.      PT/OT recommending postacute placement. She is deemed a candidate for IPR, pending Medicaid authorization.    I have placed the appropriate orders for post-discharge needs.    Review of Systems  ROS     Pertinent positives/negatives as mentioned above.     A complete review of systems was personally done by me. All other systems were negative.       Physical Exam  Temp:  [36.4 °C (97.5 °F)-36.9 °C (98.4 °F)] 36.4 °C (97.5 °F)  Pulse:  [75-78] 76  Resp:  [15-18] 15  BP: ()/(59-89) 104/70  SpO2:  [94 %-97 %] 95 %    Physical Exam  Vitals reviewed.   Constitutional:       General: She is not in acute distress.     Appearance: Normal appearance. She is normal weight. She is not ill-appearing or diaphoretic.   HENT:      Head: Normocephalic and atraumatic.      Right Ear: External ear normal.      Left Ear: External ear normal.      Mouth/Throat:      Mouth: Mucous membranes are moist.      Pharynx: No oropharyngeal exudate or posterior oropharyngeal erythema.   Eyes:      General: No scleral icterus.     Extraocular Movements: Extraocular movements intact.      Conjunctiva/sclera: Conjunctivae normal.      Pupils: Pupils are equal, round, and reactive to light.   Cardiovascular:      Rate and Rhythm: Normal rate and regular rhythm.      Heart sounds: Normal heart sounds. No murmur  heard.  Pulmonary:      Effort: Pulmonary effort is normal. No respiratory distress.      Breath sounds: Normal breath sounds. No stridor. No wheezing, rhonchi or rales.   Chest:      Chest wall: No tenderness.   Abdominal:      General: Bowel sounds are normal. There is no distension.      Palpations: Abdomen is soft. There is no mass.      Tenderness: There is no abdominal tenderness. There is no guarding or rebound.   Musculoskeletal:         General: No swelling or tenderness. Normal range of motion.      Cervical back: Normal range of motion and neck supple. No rigidity. No muscular tenderness.      Comments: Left ankle surgical site intact, dressing CDI.   Lymphadenopathy:      Cervical: No cervical adenopathy.   Skin:     General: Skin is warm and dry.      Coloration: Skin is not jaundiced.      Findings: No rash.   Neurological:      General: No focal deficit present.      Mental Status: She is alert and oriented to person, place, and time. Mental status is at baseline.      Cranial Nerves: No cranial nerve deficit.   Psychiatric:         Mood and Affect: Mood normal.         Behavior: Behavior normal.         Thought Content: Thought content normal.         Judgment: Judgment normal.         Fluids  No intake or output data in the 24 hours ending 06/27/25 0923       Laboratory  Recent Labs     06/25/25  0101 06/26/25  0156 06/27/25  0741   WBC 8.6 10.5 8.6   RBC 2.75* 2.51* 2.62*   HEMOGLOBIN 10.0* 9.1* 9.7*   HEMATOCRIT 30.4* 27.9* 29.3*   .5* 111.2* 111.8*   MCH 36.4* 36.3* 37.0*   MCHC 32.9 32.6 33.1   RDW 78.4* 78.5* 79.7*   PLATELETCT 295 251 261   MPV 10.1 10.1 10.6     Recent Labs     06/25/25  0101 06/26/25  0156 06/27/25  0741   SODIUM 136 135 138   POTASSIUM 4.1 3.5* 3.7   CHLORIDE 103 103 108   CO2 21 25 21   GLUCOSE 136* 109* 90   BUN 9 8 7*   CREATININE 0.96 0.79 0.72   CALCIUM 8.0* 7.7* 8.2*                     Imaging  DX-PORTABLE FLUORO > 1 HOUR   Final Result      Portable  fluoroscopy utilized for 2 minutes 10 seconds.         INTERPRETING LOCATION: [address], [place], [address]      DX-TIBIA AND FIBULA LEFT   Final Result      Digitized intraoperative radiograph is submitted for review. This examination is not for diagnostic purpose but for guidance during a surgical procedure. Please see the patient's chart for full procedural details.         INTERPRETING LOCATION: [address], [place], [address]      US-RUQ   Final Result         1. Normal gallbladder and right upper quadrant ultrasound.      [id number] W/O PLUS RECONS LEFT   Final Result      1.  Mildly displaced, comminuted, intra-articular fracture of the distal tibia extending to the distal tibial articular surface.   2.  Small ankle joint effusion.      DX-ANKLE 3+ VIEWS LEFT   Final Result      Comminuted intra-articular fracture of the distal left tibia with regional soft tissue swelling.      DX-TIBIA AND FIBULA LEFT   Final Result      Acute closed nondisplaced intra-articular spiral fracture of the distal left tibia. Regional soft tissue swelling.           Assessment/Plan  * Fracture of left tibia- (present on admission)  Assessment & Plan  - Due to mechanical fall.  - now s/p ORIF of the left distal tibia pilon fracture with 4.0 cannulated screws and fixation of the left tibial shaft fracture with intramedullary nail on 6/24/2025.  - Vitamin D level low.  Continue oral cholecalciferol replacement.  - continue pain control.  Continue scheduled Tylenol and IV Toradol, along with oral oxycodone and IV Dilaudid. Monitor for narcotic side effects particularly respiratory depression, AMS, and constipation.    - Continue pharmacologic DVT prophylaxis while in the hospital.  Continue Lovenox SQ.   - PT/OT recommending postacute placement which she is agreeable to.  PM&R deemed her a candidate for IRF, pending Medicaid insurance authorization for acute rehab placement.  I discussed discharge planning with YOSELIN/JOB.      Vitamin D  insufficiency- (present on admission)  Assessment & Plan  - Continue cholecalciferol 5000 units daily.    Alcohol withdrawal syndrome without complication (HCC)- (present on admission)  Assessment & Plan  - History of drinking alcohol for years and history of cirrhosis.  States she is not drinking daily anymore.  - Tremors chronic.  - At this point withdrawal less likely  - Continue thiamine, folate and multivitamin supplementation.    Macrocytic anemia- (present on admission)  Assessment & Plan  - Due to cirrhosis, alcohol dependence.  - Restrictive transfusion strategy.  Transfuse if hemoglobin drops below 7.      Gastroesophageal reflux disease- (present on admission)  Assessment & Plan  -Continue pantoprazole    Cirrhosis (HCC)- (present on admission)  Assessment & Plan  - Due to alcohol dependence.  Patient states she has stopped drinking alcohol daily when she found out that she had cirrhosis.  - Ultrasound showed normal liver contour with no evidence of solid mass lesion.  - Continue to encourage alcohol cessation.         VTE prophylaxis: Lovenox SQ

## 2025-06-27 NOTE — FLOWSHEET NOTE
06/27/25 1651   Events/Summary/Plan   Events/Summary/Plan RT assessment   Vital Signs   Pulse 97   Respiration 16   Pulse Oximetry 97 %   $ Pulse Oximetry (Spot Check) Yes   Respiratory Assessment   Respiratory Pattern Within Normal Limits   Level of Consciousness Alert   Chest Exam   Work Of Breathing / Effort Within Normal Limits   Breath Sounds   RUL Breath Sounds Clear   RML Breath Sounds Clear   RLL Breath Sounds Clear   MARIA E Breath Sounds Clear   LLL Breath Sounds Clear   Oxygen   O2 (LPM) 0   O2 Delivery Device None - Room Air   Smoking History   Have you ever smoked Yes  (VAPS)   Have you smoked in the last 12 months Yes   Have you quit smoking Yes   Confirm Quit Date 06/20/25

## 2025-06-28 ENCOUNTER — APPOINTMENT (OUTPATIENT)
Dept: PHYSICAL THERAPY | Facility: REHABILITATION | Age: 30
DRG: 560 | End: 2025-06-28
Attending: PHYSICAL MEDICINE & REHABILITATION
Payer: MEDICAID

## 2025-06-28 ENCOUNTER — APPOINTMENT (OUTPATIENT)
Dept: OCCUPATIONAL THERAPY | Facility: REHABILITATION | Age: 30
DRG: 560 | End: 2025-06-28
Attending: PHYSICAL MEDICINE & REHABILITATION
Payer: MEDICAID

## 2025-06-28 LAB
25(OH)D3 SERPL-MCNC: 15 NG/ML (ref 30–100)
ALBUMIN SERPL BCP-MCNC: 2.9 G/DL (ref 3.2–4.9)
ALBUMIN/GLOB SERPL: 1 G/DL
ALP SERPL-CCNC: 83 U/L (ref 30–99)
ALT SERPL-CCNC: 16 U/L (ref 2–50)
ANION GAP SERPL CALC-SCNC: 11 MMOL/L (ref 7–16)
AST SERPL-CCNC: 22 U/L (ref 12–45)
BASOPHILS # BLD AUTO: 0.8 % (ref 0–1.8)
BASOPHILS # BLD: 0.06 K/UL (ref 0–0.12)
BILIRUB SERPL-MCNC: 0.3 MG/DL (ref 0.1–1.5)
BUN SERPL-MCNC: 6 MG/DL (ref 8–22)
CALCIUM ALBUM COR SERPL-MCNC: 9.5 MG/DL (ref 8.5–10.5)
CALCIUM SERPL-MCNC: 8.6 MG/DL (ref 8.5–10.5)
CHLORIDE SERPL-SCNC: 105 MMOL/L (ref 96–112)
CO2 SERPL-SCNC: 22 MMOL/L (ref 20–33)
CREAT SERPL-MCNC: 0.64 MG/DL (ref 0.5–1.4)
EOSINOPHIL # BLD AUTO: 0.24 K/UL (ref 0–0.51)
EOSINOPHIL NFR BLD: 3.2 % (ref 0–6.9)
ERYTHROCYTE [DISTWIDTH] IN BLOOD BY AUTOMATED COUNT: 78.9 FL (ref 35.9–50)
GFR SERPLBLD CREATININE-BSD FMLA CKD-EPI: 121 ML/MIN/1.73 M 2
GLOBULIN SER CALC-MCNC: 2.9 G/DL (ref 1.9–3.5)
GLUCOSE SERPL-MCNC: 80 MG/DL (ref 65–99)
HCT VFR BLD AUTO: 29.9 % (ref 37–47)
HGB BLD-MCNC: 9.7 G/DL (ref 12–16)
IMM GRANULOCYTES # BLD AUTO: 0.02 K/UL (ref 0–0.11)
IMM GRANULOCYTES NFR BLD AUTO: 0.3 % (ref 0–0.9)
LYMPHOCYTES # BLD AUTO: 3.07 K/UL (ref 1–4.8)
LYMPHOCYTES NFR BLD: 41 % (ref 22–41)
MAGNESIUM SERPL-MCNC: 1.6 MG/DL (ref 1.5–2.5)
MCH RBC QN AUTO: 36.1 PG (ref 27–33)
MCHC RBC AUTO-ENTMCNC: 32.4 G/DL (ref 32.2–35.5)
MCV RBC AUTO: 111.2 FL (ref 81.4–97.8)
MONOCYTES # BLD AUTO: 0.56 K/UL (ref 0–0.85)
MONOCYTES NFR BLD AUTO: 7.5 % (ref 0–13.4)
NEUTROPHILS # BLD AUTO: 3.54 K/UL (ref 1.82–7.42)
NEUTROPHILS NFR BLD: 47.2 % (ref 44–72)
NRBC # BLD AUTO: 0 K/UL
NRBC BLD-RTO: 0 /100 WBC (ref 0–0.2)
PHOSPHATE SERPL-MCNC: 3.8 MG/DL (ref 2.5–4.5)
PLATELET # BLD AUTO: 257 K/UL (ref 164–446)
PMV BLD AUTO: 10.3 FL (ref 9–12.9)
POTASSIUM SERPL-SCNC: 3.5 MMOL/L (ref 3.6–5.5)
PROT SERPL-MCNC: 5.8 G/DL (ref 6–8.2)
RBC # BLD AUTO: 2.69 M/UL (ref 4.2–5.4)
SODIUM SERPL-SCNC: 138 MMOL/L (ref 135–145)
TSH SERPL DL<=0.005 MIU/L-ACNC: 1.59 UIU/ML (ref 0.38–5.33)
WBC # BLD AUTO: 7.5 K/UL (ref 4.8–10.8)

## 2025-06-28 PROCEDURE — 97530 THERAPEUTIC ACTIVITIES: CPT

## 2025-06-28 PROCEDURE — 97166 OT EVAL MOD COMPLEX 45 MIN: CPT

## 2025-06-28 PROCEDURE — 700111 HCHG RX REV CODE 636 W/ 250 OVERRIDE (IP): Mod: JZ | Performed by: PHYSICAL MEDICINE & REHABILITATION

## 2025-06-28 PROCEDURE — 770010 HCHG ROOM/CARE - REHAB SEMI PRIVAT*

## 2025-06-28 PROCEDURE — A9270 NON-COVERED ITEM OR SERVICE: HCPCS | Performed by: PHYSICAL MEDICINE & REHABILITATION

## 2025-06-28 PROCEDURE — 85025 COMPLETE CBC W/AUTO DIFF WBC: CPT

## 2025-06-28 PROCEDURE — 700102 HCHG RX REV CODE 250 W/ 637 OVERRIDE(OP): Performed by: PHYSICAL MEDICINE & REHABILITATION

## 2025-06-28 PROCEDURE — 97110 THERAPEUTIC EXERCISES: CPT

## 2025-06-28 PROCEDURE — 36415 COLL VENOUS BLD VENIPUNCTURE: CPT

## 2025-06-28 PROCEDURE — 82306 VITAMIN D 25 HYDROXY: CPT

## 2025-06-28 PROCEDURE — 97535 SELF CARE MNGMENT TRAINING: CPT

## 2025-06-28 PROCEDURE — 84443 ASSAY THYROID STIM HORMONE: CPT

## 2025-06-28 PROCEDURE — 700102 HCHG RX REV CODE 250 W/ 637 OVERRIDE(OP): Performed by: STUDENT IN AN ORGANIZED HEALTH CARE EDUCATION/TRAINING PROGRAM

## 2025-06-28 PROCEDURE — 83735 ASSAY OF MAGNESIUM: CPT

## 2025-06-28 PROCEDURE — 97162 PT EVAL MOD COMPLEX 30 MIN: CPT

## 2025-06-28 PROCEDURE — 80053 COMPREHEN METABOLIC PANEL: CPT

## 2025-06-28 PROCEDURE — 84100 ASSAY OF PHOSPHORUS: CPT

## 2025-06-28 PROCEDURE — A9270 NON-COVERED ITEM OR SERVICE: HCPCS | Performed by: STUDENT IN AN ORGANIZED HEALTH CARE EDUCATION/TRAINING PROGRAM

## 2025-06-28 RX ORDER — VITAMIN B COMPLEX
2000 TABLET ORAL DAILY
Status: DISCONTINUED | OUTPATIENT
Start: 2025-06-28 | End: 2025-06-28

## 2025-06-28 RX ORDER — POTASSIUM CHLORIDE 1500 MG/1
20 TABLET, EXTENDED RELEASE ORAL ONCE
Status: COMPLETED | OUTPATIENT
Start: 2025-06-28 | End: 2025-06-28

## 2025-06-28 RX ORDER — POTASSIUM CHLORIDE 1500 MG/1
20 TABLET, EXTENDED RELEASE ORAL DAILY
Status: DISCONTINUED | OUTPATIENT
Start: 2025-06-28 | End: 2025-06-28

## 2025-06-28 RX ORDER — OXYCODONE HYDROCHLORIDE 10 MG/1
10 TABLET ORAL
Refills: 0 | Status: DISCONTINUED | OUTPATIENT
Start: 2025-06-28 | End: 2025-07-03 | Stop reason: HOSPADM

## 2025-06-28 RX ORDER — METAXALONE 800 MG/1
400 TABLET ORAL 3 TIMES DAILY PRN
Status: DISCONTINUED | OUTPATIENT
Start: 2025-06-28 | End: 2025-07-03 | Stop reason: HOSPADM

## 2025-06-28 RX ADMIN — GABAPENTIN 100 MG: 100 CAPSULE ORAL at 19:59

## 2025-06-28 RX ADMIN — OXYCODONE HYDROCHLORIDE 15 MG: 10 TABLET ORAL at 13:47

## 2025-06-28 RX ADMIN — Medication 100 MG: at 08:26

## 2025-06-28 RX ADMIN — MAGNESIUM HYDROXIDE 30 ML: 1200 LIQUID ORAL at 19:59

## 2025-06-28 RX ADMIN — POLYETHYLENE GLYCOL 3350 1 PACKET: 17 POWDER, FOR SOLUTION ORAL at 19:58

## 2025-06-28 RX ADMIN — FOLIC ACID 1 MG: 1 TABLET ORAL at 08:26

## 2025-06-28 RX ADMIN — OXYCODONE HYDROCHLORIDE 10 MG: 10 TABLET ORAL at 06:03

## 2025-06-28 RX ADMIN — OXYCODONE HYDROCHLORIDE 10 MG: 10 TABLET ORAL at 10:46

## 2025-06-28 RX ADMIN — ACETAMINOPHEN 1000 MG: 500 TABLET ORAL at 17:48

## 2025-06-28 RX ADMIN — DOCUSATE SODIUM 50MG AND SENNOSIDES 8.6MG 2 TABLET: 8.6; 5 TABLET, FILM COATED ORAL at 19:59

## 2025-06-28 RX ADMIN — ACETAMINOPHEN 1000 MG: 500 TABLET ORAL at 13:22

## 2025-06-28 RX ADMIN — OXYCODONE HYDROCHLORIDE 10 MG: 10 TABLET ORAL at 02:23

## 2025-06-28 RX ADMIN — GABAPENTIN 100 MG: 100 CAPSULE ORAL at 14:55

## 2025-06-28 RX ADMIN — OXYCODONE HYDROCHLORIDE 15 MG: 10 TABLET ORAL at 22:06

## 2025-06-28 RX ADMIN — THERA TABS 1 TABLET: TAB at 08:26

## 2025-06-28 RX ADMIN — OMEPRAZOLE 40 MG: 20 CAPSULE, DELAYED RELEASE ORAL at 08:26

## 2025-06-28 RX ADMIN — POTASSIUM CHLORIDE 20 MEQ: 1500 TABLET, EXTENDED RELEASE ORAL at 11:39

## 2025-06-28 RX ADMIN — METAXALONE 400 MG: 800 TABLET ORAL at 08:27

## 2025-06-28 RX ADMIN — Medication 5000 UNITS: at 08:27

## 2025-06-28 RX ADMIN — IBUPROFEN 800 MG: 400 TABLET, FILM COATED ORAL at 11:42

## 2025-06-28 RX ADMIN — OXYCODONE HYDROCHLORIDE 15 MG: 10 TABLET ORAL at 17:48

## 2025-06-28 RX ADMIN — ENOXAPARIN SODIUM 40 MG: 100 INJECTION SUBCUTANEOUS at 17:48

## 2025-06-28 RX ADMIN — GABAPENTIN 100 MG: 100 CAPSULE ORAL at 08:26

## 2025-06-28 RX ADMIN — POLYETHYLENE GLYCOL 3350 1 PACKET: 17 POWDER, FOR SOLUTION ORAL at 08:32

## 2025-06-28 RX ADMIN — ACETAMINOPHEN 1000 MG: 500 TABLET ORAL at 06:03

## 2025-06-28 RX ADMIN — ESCITALOPRAM OXALATE 10 MG: 10 TABLET ORAL at 19:59

## 2025-06-28 ASSESSMENT — PAIN DESCRIPTION - PAIN TYPE
TYPE: ACUTE PAIN;SURGICAL PAIN
TYPE: ACUTE PAIN
TYPE: ACUTE PAIN;SURGICAL PAIN
TYPE: ACUTE PAIN
TYPE: ACUTE PAIN
TYPE: ACUTE PAIN;SURGICAL PAIN
TYPE: ACUTE PAIN
TYPE: ACUTE PAIN;SURGICAL PAIN
TYPE: ACUTE PAIN;SURGICAL PAIN
TYPE: ACUTE PAIN

## 2025-06-28 ASSESSMENT — BRIEF INTERVIEW FOR MENTAL STATUS (BIMS)
ASKED TO RECALL SOCK: YES, NO CUE REQUIRED
BIMS SUMMARY SCORE: 15
WHAT YEAR IS IT: CORRECT
WHAT DAY OF THE WEEK IS IT: CORRECT
ASKED TO RECALL BED: YES, NO CUE REQUIRED
INITIAL REPETITION OF BED BLUE SOCK - FIRST ATTEMPT: 3
ASKED TO RECALL BLUE: YES, NO CUE REQUIRED
WHAT MONTH IS IT: ACCURATE WITHIN 5 DAYS

## 2025-06-28 ASSESSMENT — ACTIVITIES OF DAILY LIVING (ADL): TOILETING: INDEPENDENT

## 2025-06-28 ASSESSMENT — FIBROSIS 4 INDEX: FIB4 SCORE: 0.96

## 2025-06-28 NOTE — CARE PLAN
Problem: Pain - Standard  Goal: Alleviation of pain or a reduction in pain to the patient’s comfort goal  Outcome: Not Met     Problem: Knowledge Deficit - Standard  Goal: Patient and family/care givers will demonstrate understanding of plan of care, disease process/condition, diagnostic tests and medications  Outcome: Progressing   The patient is Watcher - Medium risk of patient condition declining or worsening

## 2025-06-28 NOTE — PROGRESS NOTES
-  [id number] Pt arrived at [facility] from [facility] via wheelchair. Dr. Rausch to follow for diagnosis of left tibia fx. Initial assessment initiated. Pt oriented to room and facility routine and safety measures; pt education binder provided and discussed. Pt A/O x 4, continent of bowel and bladder. Minimal assist for transfers. Pt has pain or discomfort at this time. Pt positioned for comfort in bed. Call light within reach, safety measures in place. Will continue to monitor.

## 2025-06-28 NOTE — CARE PLAN
The patient is Stable - Low risk of patient condition declining or worsening    Shift Goals  Clinical Goals: safety, pain mgt  Patient Goals: pain mgt    Progress made toward(s) clinical / shift goals:  2    Problem: Pain - Standard  Goal: Alleviation of pain or a reduction in pain to the patient’s comfort goal  Outcome: Progressing: pt medicated per MAR for pain. PRN oxycodone dose increased by MD from 10 mg q 3 hour to 15 mg q 3 hours for severe pain. Pt reports 4/10 pain after receiving 15 mg dose. This is the lowest pain level achieved so far today.     Problem: Urinary Elimination:  Goal: Ability to reestablish a normal urinary elimination pattern will improve  Outcome: Progressing: voids as needed. PVR 21 mL.

## 2025-06-28 NOTE — THERAPY
"Occupational Therapy   Initial Evaluation     Patient Name:  Linda Carpenter  Age:  29 y.o., Sex:  female  Medical Record #:  [id number]  Today's Date: 6/28/2025     Subjective: \"I feel like the pain meds aren't even touching the pain.\" Pt repeorted 10/10 pain in LLE      Objective:    06/28/25 1031   OT Charge Group   Charges Yes   OT Self Care / ADL (Units) 3   OT Evaluation OT Evaluation Mod   OT Total Time Spent   OT Individual Total Time Spent (Mins) 90   Precautions   Precautions Fall Risk;Weight Bearing;Orthopedic   Fall Risk Poor balance   Weight Bearing TTWB LLE   Pain 0 - 10 Group   Location Leg   Location Orientation Left   Pain Rating Scale (NPRS) 10   Description Aching;Constant   Cognition    Orientation Level Oriented x 4   Level of Consciousness Alert   Ability To Follow Commands 3 Step   ABS (Agitated Behavior Scale)   Agitated Behavior Scale Performed No   Prior Living Situation   Prior Services Home-Independent   Housing / Facility 1 Story House   Steps Into Home 4  (pt reported that they are steep)   Steps In Home 0   Rail Both Rail (Steps into Home)  (unable to access at the same time)   Elevator No   Bathroom Set up Bathtub / Shower Combination   Equipment Owned None   Lives with - Patient's Self Care Capacity Spouse;Child Less than 18 Years of Age  (4 and 10 yo boys)   Prior Level of ADL Function   Self Feeding Independent   Grooming / Hygiene Independent   Bathing Independent   Dressing Independent   Toileting Independent   Prior Level of IADL Function   Medication Management Independent   Laundry Independent   Kitchen Mobility Independent   Finances Independent   Home Management Independent   Shopping Independent   Prior Level Of Mobility Independent Without Device in Community;Independent Without Device in Home;Independent With Steps in Home   Driving / Transportation Driving Independent   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)   Leisure Interests Family " "  Prior Functioning: Everyday Activities   Self Care Independent   Indoor Mobility (Ambulation) Independent   Stairs Independent   Functional Cognition Independent   Prior Device Use None of the given options   Cognitive Pattern Assessment   Cognitive Pattern Assessment Used BIMS   Brief Interview for Mental Status (BIMS)   Repetition of Three Words (First Attempt) 3   Temporal Orientation: Year Correct   Temporal Orientation: Month Accurate within 5 days   Temporal Orientation: Day Correct   Recall: \"Sock\" Yes, no cue required   Recall: \"Blue\" Yes, no cue required   Recall: \"Bed\" Yes, no cue required   BIMS Summary Score 15   Confusion Assessment Method (CAM)   Is there evidence of an acute change in mental status from the patient's baseline? No   Inattention Behavior not present   Disorganized thinking Behavior not present   Altered level of consciousness Behavior not present   Vision Screen   Vision Not tested  (no reports of double/blurry vision)   Impairment Assessments   Impairment Assessments Balance   Observed Balance Assessment   Sitting Balance (Static) Supervised   Sitting Balance (Dynamic) Stand By Assist   Standing Balance (Static) Contact Guard Assist   Standing Balance (Dynamic) Contact Guard Assist   Hearing, Speech, and Vision   Ability to Hear Adequate   Ability to See in Adequate Light Adequate   Expression of Ideas and Wants Without difficulty   Understanding Verbal and Non-Verbal Content Understands   Eating   Assistance Needed Independent   CARE Score - Eating 6   Eating   Level of Assist Independent   Oral Hygiene   Assistance Needed Independent   CARE Score - Oral Hygiene 6   Oral Hygiene   Level of Assist Independent   Upper Body Dressing   Assistance Needed Independent   CARE Score - Upper Body Dressing 6   Upper Body Dressing   Level of Assist Independent   Lower Body Dressing   Assistance Needed Physical assistance   Physical Assistance Level 25% or less   CARE Score - Lower Body Dressing " 3   Lower Body Dressing   Level of Assist Minimal Assist   Putting On/Taking Off Footwear   Assistance Needed Physical assistance   Physical Assistance Level 25% or less   CARE Score - Putting On/Taking Off Footwear 3   Putting On/Taking Off Footwear   Level of Assist Minimal Assist   Shower/Bathe Self   Assistance Needed Incidental touching   CARE Score - Shower/Bathe Self 4   Shower/Bathe Self   Level of Assist Contact Guard Assist   Tub/Shower Transfer   Level of Assist Minimal Assist   Toilet Transfer   Assistance Needed Physical assistance   Physical Assistance Level 25% or less   CARE Score - Toilet Transfer 3   Toilet Transfer   Level of Assist Minimal Assist   Toileting Hygiene   Assistance Needed Incidental touching   CARE Score - Toileting Hygiene 4   Toileting Hygiene   Level of Assist Contact Guard Assist   Chair/Bed-to-Chair Transfer   Assistance Needed Physical assistance   Physical Assistance Level 25% or less   CARE Score - Chair/Bed-to-Chair Transfer 3   Chair/Bed-to-Chair Transfer   Level of Assist Minimal Assist   Problem List   Problem List Decreased Active Daily Living Skills;Decreased Homemaking Skills;Impaired Postural Control / Balance   Strengths & Barriers   Strengths Able to follow instructions;Adequate strength;Alert and oriented;Independent prior level of function;Making steady progress towards goals;Motivated for self care and independence;Pleasant and cooperative;Supportive family;Willingly participates in therapeutic activities   Barriers Impaired balance;Pain poorly managed   Benefit    Therapy Benefit Patient Would Benefit from Inpatient Rehab Occupational Therapy to Maximize [place] with ADLs, IADLs and Functional Mobility.   Current Discharge Plan   Current Discharge Plan Return to Prior Living Situation   OT DME Recommendations   Bathroom Equipment (NOT TYPICALLY COVERED BY INSURANCE) Tub transfer bench;Hand held shower head  (pt has hand held but will not reach if she's  sitting down)   Additional Equipment (NOT TYPICALLY COVERED BY INSURANCE) Reacher   Interdisciplinary Plan of Care Collaboration   Patient Position at End of Therapy Chair Alarm On;Self Releasing Lap Belt Applied;Call Light within Reach;Tray Table within Reach;Phone within Reach;Family / Friend in Room;Seated  (, friend and son present in room)     Patient has been educated on the following items: occupational therapy role, occupational therapy plan of care, rehab expectations, goal setting, and fall risk and use of call light    Assessment:   Patient is a 29 y.o. female with a diagnosis of History of lower leg fracture [Z87.81].    Currently, the patient has the following precautions: Fall Risk: Poor balance, Weight Bearing: TTWB LLE    Patient's PMH includes: Past Medical History[1]  Patient's Past Surgical History: Past Surgical History[2]    OT evaluation performed today; functional performance at today's assessment is as above. At baseline, the patient was independent with ADLs/IADLs. The patient is limited by the following barriers: Impaired balance, Pain poorly managed    Additional factors influencing patient status and prognosis include: prior level of function, PMH, and the above comorbidities.    The patient is performing well below their baseline level of function and will benefit from an interdisciplinary high intensity rehabilitation program to maximize functional independence with ADL's, IADL's and functional mobility, decrease burden of care, and support a safe return home with spousal and family support. Pt reported that pt's parents and in laws will assist with , but pt Is on her own for ADL's and mobility upon d/c.     Plan:   Recommend Occupational Therapy  minutes per day 5-7 days per week for 1 weeks for the following treatments:  OT Self Care/ADL, OT Community Reintegration, OT Neuro Re-Ed/Balance, OT Therapeutic Activity, OT Evaluation, and OT Therapeutic  Exercise.    Passport items to be completed:  Perform bathroom transfers, complete dressing, complete feeding, get ready for the day, prepare a simple meal, participate in household tasks, adapt home for safety needs, demonstrate home exercise program, complete caregiver training     Goals: Long term and short term goals have been discussed with patient and they are in agreement.    Occupational Therapy Goals (Active)       Problem: Dressing       Dates: Start:  06/28/25         Goal: STG-Within one week, patient will dress LB with SBA       Dates: Start:  06/28/25               Problem: Functional Transfers       Dates: Start:  06/28/25         Goal: STG-Within one week, patient will transfer to toilet with SBA       Dates: Start:  06/28/25               Problem: OT Long Term Goals       Dates: Start:  06/28/25         Goal: LTG-By discharge, patient will complete basic self care tasks with mod I        Dates: Start:  06/28/25            Goal: LTG-By discharge, patient will perform bathroom transfers with mod I       Dates: Start:  06/28/25            Goal: LTG-By discharge, patient will complete basic home management with mod I       Dates: Start:  06/28/25               Problem: Toileting       Dates: Start:  06/28/25         Goal: STG-Within one week, patient will complete toileting tasks with SBA       Dates: Start:  06/28/25                   [1]   Past Medical History:  Diagnosis Date    Anemia 9/4/2024    Coagulopathy (HCC) 9/4/2024    Gastroesophageal reflux disease 9/4/2024   [2]   Past Surgical History:  Procedure Laterality Date    TIBIA NAILING INTRAMEDULLARY Left 6/24/2025    Procedure: INSERTION, INTRAMEDULLARY ZOEY, LEFT TIBIA;  Surgeon: Rashaun cMdaniel M.D.;  Location: SURGERY [facility];  Service: Orthopedics    HI UPPER GI ENDOSCOPY,DIAGNOSIS N/A 9/4/2024    Procedure: GASTROSCOPY;  Surgeon: Giuliano Fountain M.D.;  Location: SURGERY SAME DAY [facility];  Service: Gastroenterology    HI  COLONOSCOPY-FLEXIBLE N/A 9/4/2024    Procedure: COLONOSCOPY;  Surgeon: Giuliano Fountain M.D.;  Location: SURGERY SAME DAY [facility];  Service: Gastroenterology

## 2025-06-28 NOTE — PROGRESS NOTES
4 Eyes Skin Assessment Completed by CHELSEA Arias and CHELSEA Dubon.    Skin assessment is primarily focused on high risk bony prominences. Pay special attention to skin beneath and around medical devices, high risk bony prominences, skin to skin areas and areas where the patient lacks sensation to feel pain and areas where the patient previously had breakdown.     Head (Occipital):  WDL   Ears (Under Medical Devices): WDL   Nose (Under Medical Devices): WDL   Mouth:  WDL   Neck: WDL   Breast/Chest:  WDL   Shoulder Blades:  WDL   Spine:   WDL   (R) Arm/Elbow/Hand: WDL   (L) Arm/Elbow/Hand: WDL   Abdomen: WDL   Pannus/Groin:  WDL   Sacrum/Coccyx:   Rash, Pink, and Blanching   (R) Ischial Tuberosity (Sit Bones):  WDL   (L) Ischial Tuberosity (Sit Bones):  WDL   (R) Leg:  WDL   (L) Leg:  Multiple incisions with staples and sutures   (R) Heel:  Pink and Blanching   (R) Foot/Toe: WDL   (L) Heel: Pink and Blanching   (L) Foot/Toe:  Pink and Edema       DEVICES IN USE:   Respiratory Devices:  NA, patient on room air  Feeding Devices:  N/A   Lines & BP Monitoring Devices:  N/A    Orthopedic Devices:  Ace wrap  Miscellaneous Devices:  N/A    PROTOCOL INTERVENTIONS:   Standard/Trauma Bed:  Already in place    WOUND PHOTOS:   Completed and in EPIC     WOUND CONSULT:   Consult ordered for the following areas sacrum and LLE incisions

## 2025-06-28 NOTE — PROGRESS NOTES
NEW ADMIT FALL PREVENTION EDUCATION                                    AND INTERVENTION       Fall Prevention Education Video watched: Yes  Strip Alarm in place: Yes  Alarming seatbelt No   Does patient need a posey bed?: No   Does patient have the right size non-skid sock?: Yes      Admitting RN:  Johnnie VELAZQUEZ

## 2025-06-28 NOTE — CARE PLAN
The patient is Stable - Low risk of patient condition declining or worsening    Shift Goals  Clinical Goals: safety, pain control  Patient Goals: rest, pain control    Progress made toward(s) clinical / shift goals:      Problem: Knowledge Deficit - Standard  Goal: Patient and family/care givers will demonstrate understanding of plan of care, disease process/condition, diagnostic tests and medications  Outcome: Progressing       Problem: Fall Risk - Rehab  Goal: Patient will remain free from falls  Outcome: Progressing       Problem: Pain - Standard  Goal: Alleviation of pain or a reduction in pain to the patient’s comfort goal  Outcome: Progressing       Problem: Urinary Elimination:  Goal: Ability to reestablish a normal urinary elimination pattern will improve  Outcome: Progressing

## 2025-06-28 NOTE — PROGRESS NOTES
NURSING DAILY NOTE    Name: Linda Carpenter  Date of Admission: 6/27/2025  Admitting Diagnosis: Fracture of left tibia  Attending Physician: Alondra Rausch D.o.  Allergies: Cefaclor and Sulfamethoxazole w-trimethoprim    Safety  Patient Assist  o   Patient Precautions  o   Precaution Comments  o   Bed Transfer Status  o   Toilet Transfer Status  o   Assistive Devices  oWalker - front wheel  Oxygen  oNone - Room Air  Diet/Therapeutic Dining  o  Current Diet Order   Procedures    Diet Order Diet: Regular     Pill Administration  owhole  Agitated Behavioral Scale  o   ABS Level of Severity  o     Fall Risk  Has the patient had a fall this admission?  Gio  Rosie Holguin Fall Risk Scoring  o14, MODERATE RISK  Fall Risk Safety Measures  yesika alarm and chair alarm    Vitals  Temperature: 36.4 °C (97.6 °F)  Temp src: Temporal  Pulse: 66  Respiration: 17  Blood Pressure: 125/88  Blood Pressure MAP (Calculated): 100 MM HG  BP Location: Right, Upper Arm  Patient BP Position: Minor's Position    Oxygen  Pulse Oximetry: 97 %  O2 (LPM): 0  O2 Delivery Device: None - Room Air    Bowel and Bladder  Last Bowel Movement  o06/26/25 (per report)  Stool Type  o   Bowel Device  o   Continent  oBladder: Continent void  oBowel: No movement  Bladder Function  oNumber of Times Voided: 1  Urine Color: Yellow  Genitourinary Assessment  oBladder Assessment (WDL):  Within Defined Limits  Erickson Catheter: Not Applicable  Urine Color: Yellow  Bladder Device: Toilet  Time Void: Yes  Bladder Scan: Post Void  $ Bladder Scan Results (mL): 2    Skin  Carmelo Score  o 18  Sensory Interventions  o Bed Types: Standard/Trauma Mattress  Skin Preventative Measures: Pillows in Use for Support / Positioning, Waffle Overlay  Moisture Interventions  o       Pain  Pain Rating Scale  o8 - Awful, hard to do anything  Pain Location  oKnee, Leg  Pain Location Orientation  oLeft  Pain Interventions  oMedication (see MAR), Rest    ADLs    Bathing  o   Linen  Change  o   Personal Hygiene  o   Chlorhexidine Bath  o   Oral Care  o   Teeth/Dentures  o   Shave  o   Nutrition Percentage Eaten  o   Environmental Precautions  o   Patient Turns/Positioning  oPatient turns self independently side to side without assistance, to offload sacral area  Patient Turns Assistance/Tolerance  o   Bed Positions  o   Head of Bed Elevated  o       Psychosocial/Neurologic Assessment  Psychosocial Assessment  oPsychosocial (WDL):  Within Defined Limits  Neurologic Assessment  oNeuro (WDL): Within Defined Limits  Level of Consciousness: Alert  oEENT (WDL):  Within Defined Limits    Cardio/Pulmonary Assessment  Edema  oLLE Edema: Generalized  Respiratory Breath Sounds  oRUL Breath Sounds: Clear  RML Breath Sounds: Clear  RLL Breath Sounds: Clear  MARIA E Breath Sounds: Clear  LLL Breath Sounds: Clear  Cardiac Assessment  oCardiac (WDL):  Within Defined Limits

## 2025-06-29 ENCOUNTER — APPOINTMENT (OUTPATIENT)
Dept: OCCUPATIONAL THERAPY | Facility: REHABILITATION | Age: 30
DRG: 560 | End: 2025-06-29
Attending: PHYSICAL MEDICINE & REHABILITATION
Payer: MEDICAID

## 2025-06-29 PROCEDURE — 97110 THERAPEUTIC EXERCISES: CPT

## 2025-06-29 PROCEDURE — A9270 NON-COVERED ITEM OR SERVICE: HCPCS | Performed by: PHYSICAL MEDICINE & REHABILITATION

## 2025-06-29 PROCEDURE — A9270 NON-COVERED ITEM OR SERVICE: HCPCS | Performed by: STUDENT IN AN ORGANIZED HEALTH CARE EDUCATION/TRAINING PROGRAM

## 2025-06-29 PROCEDURE — 97535 SELF CARE MNGMENT TRAINING: CPT

## 2025-06-29 PROCEDURE — 770010 HCHG ROOM/CARE - REHAB SEMI PRIVAT*

## 2025-06-29 PROCEDURE — 700111 HCHG RX REV CODE 636 W/ 250 OVERRIDE (IP): Mod: JZ | Performed by: PHYSICAL MEDICINE & REHABILITATION

## 2025-06-29 PROCEDURE — 700102 HCHG RX REV CODE 250 W/ 637 OVERRIDE(OP): Performed by: STUDENT IN AN ORGANIZED HEALTH CARE EDUCATION/TRAINING PROGRAM

## 2025-06-29 PROCEDURE — 700102 HCHG RX REV CODE 250 W/ 637 OVERRIDE(OP): Performed by: PHYSICAL MEDICINE & REHABILITATION

## 2025-06-29 PROCEDURE — 99232 SBSQ HOSP IP/OBS MODERATE 35: CPT | Performed by: STUDENT IN AN ORGANIZED HEALTH CARE EDUCATION/TRAINING PROGRAM

## 2025-06-29 RX ADMIN — DOCUSATE SODIUM 50MG AND SENNOSIDES 8.6MG 2 TABLET: 8.6; 5 TABLET, FILM COATED ORAL at 19:46

## 2025-06-29 RX ADMIN — GABAPENTIN 100 MG: 100 CAPSULE ORAL at 08:12

## 2025-06-29 RX ADMIN — OXYCODONE HYDROCHLORIDE 10 MG: 10 TABLET ORAL at 10:25

## 2025-06-29 RX ADMIN — ACETAMINOPHEN 1000 MG: 500 TABLET ORAL at 01:18

## 2025-06-29 RX ADMIN — POLYETHYLENE GLYCOL 3350 1 PACKET: 17 POWDER, FOR SOLUTION ORAL at 08:13

## 2025-06-29 RX ADMIN — ENOXAPARIN SODIUM 40 MG: 100 INJECTION SUBCUTANEOUS at 17:30

## 2025-06-29 RX ADMIN — OXYCODONE HYDROCHLORIDE 15 MG: 10 TABLET ORAL at 14:45

## 2025-06-29 RX ADMIN — OXYCODONE HYDROCHLORIDE 15 MG: 10 TABLET ORAL at 19:46

## 2025-06-29 RX ADMIN — OXYCODONE HYDROCHLORIDE 15 MG: 10 TABLET ORAL at 06:35

## 2025-06-29 RX ADMIN — Medication 5000 UNITS: at 08:12

## 2025-06-29 RX ADMIN — ACETAMINOPHEN 1000 MG: 500 TABLET ORAL at 06:30

## 2025-06-29 RX ADMIN — ESCITALOPRAM OXALATE 10 MG: 10 TABLET ORAL at 19:46

## 2025-06-29 RX ADMIN — OMEPRAZOLE 40 MG: 20 CAPSULE, DELAYED RELEASE ORAL at 08:12

## 2025-06-29 RX ADMIN — GABAPENTIN 100 MG: 100 CAPSULE ORAL at 19:46

## 2025-06-29 RX ADMIN — GABAPENTIN 100 MG: 100 CAPSULE ORAL at 14:42

## 2025-06-29 ASSESSMENT — PAIN DESCRIPTION - PAIN TYPE
TYPE: ACUTE PAIN
TYPE: ACUTE PAIN;SURGICAL PAIN
TYPE: ACUTE PAIN;SURGICAL PAIN

## 2025-06-29 ASSESSMENT — FIBROSIS 4 INDEX: FIB4 SCORE: 0.66

## 2025-06-29 NOTE — DISCHARGE PLANNING
CASE MANAGEMENT INITIAL ASSESSMENT    Admit Date:  2025     I met with patient to discuss role of case management / discharge planning.   Patient is a  29 y.o. female transferred from [facility].    Diagnosis: History of lower leg fracture [Z87.81]    Co-morbidities:   Patient Active Problem List    Diagnosis Date Noted    History of lower leg fracture 2025    Vitamin D insufficiency 2025    Alcohol withdrawal syndrome without complication (HCC) 2025    Fracture of left tibia 2025    Gastroesophageal reflux disease 2024    Macrocytic anemia 2024    Cirrhosis (HCC) 2024    Supervision of normal first pregnancy 2015     Prior Living Situation:  [facility] with 3-4 steps to enter   Lives with: jean, Children ages 4 and 10 yo boys    Prior Level of Function:  Medication Management: Independent  Finances: Independent  Home Management: Independent  Shopping: Independent  Prior Level Of Mobility: Independent Without Device in Community, Independent Without Device in Home, Independent With Steps in Home  Driving / Transportation: Driving Independent  Pt. 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 Pt lives in [place] with jean/Dat Watkins [phone] and their 4 year old. Son is currently being cared for by pt's mother/Terri Carpenter [phone] who lives in [place]. At baseline, pt is not employed. Pt is a stay at home  johann Gibson works full time. Pt is independent with ADLs, uses no DME for mobility. Pt is not established with a PCP at this time. Family will transport home when dc.   SW will continue to follow for evolving needs.    Plan:  1. Continue to follow patient through hospitalization and provide discharge planning in collaboration with patient, family, physicians and ancillary services.     2. Utilize community resources to ensure a safe discharge.

## 2025-06-29 NOTE — THERAPY
"Physical Therapy   Initial Evaluation     Patient Name:  Linda Carpenter  Age:  29 y.o., Sex:  female  Medical Record #:  [id number]  Today's Date: 6/28/2025     Subjective: Pt reported 12/10 pain upon PT arrival; RN provided pain med when due (~10 min later).      Objective:    06/28/25 1331   PT Charge Group   PT Therapeutic Activities (Units) 2   PT Evaluation PT Evaluation Mod   PT Total Time Spent   PT Individual Total Time Spent (Mins) 60   Precautions   Precautions Fall Risk;Weight Bearing;Orthopedic   Fall Risk Poor balance;History of falls   Weight Bearing TTWB LLE   Comments PRAFO boot as tolerated in bed   Pain 0 - 10 Group   Location Leg   Location Orientation Left   Pain Rating Scale (NPRS) 10  (\"12\")   Description Burning   Cognition    Orientation Level Oriented x 4   Prior Living Situation   Prior Services Home-Independent;None   Housing / Facility Mobile Home   Steps Into Home 4  (1 onto porch, + 3 EVERT)   Steps In Home 0   Rail Both Rail (Steps into Home)  (unable to hold both simultaneously)   Equipment Owned Crutches  (Pt has access to mother's commode and FWW (similar in body size per pt report))   Lives with - Patient's Self Care Capacity Significant Other;Child Less than 18 Years of Age   Comments Fiance works during the day; pt has help for childcare   Prior Level of Functional Mobility   Bed Mobility Independent   Transfer Status Independent   Ambulation Independent   Distance Ambulation Community Distances   Assistive Devices Used None   Stairs Independent   Prior Functioning: Everyday Activities   Self Care Independent   Indoor Mobility (Ambulation) Independent   Stairs Independent   Functional Cognition Independent   Prior Device Use None of the given options   Sensation Lower Body   Lower Extremity Sensation     (Intact to light touch on foot)   Observed Balance Assessment   Sitting Balance (Static) Modified Independent   Sitting Balance (Dynamic) Set Up   Comments Requires UE support " during standing d/t TTWB   Roll Left and Right   Assistance Needed Verbal cues   CARE Score - Roll Left and Right 4   Roll Left and Right   Level of Assist Stand By Assist   Additional Description Use of bed rail;Completed on regular bed   Sit to Lying   Assistance Needed Verbal cues   CARE Score - Sit to Lying 4   Sit to Lying   Level of Assist Stand By Assist   Additional Description Use of bed rail;Completed on regular bed   Lying to Sitting on Side of Bed   Assistance Needed Verbal cues   CARE Score - Lying to Sitting on Side of Bed 4   Lying to Sitting on Side of Bed   Level of Assist Stand By Assist   Additional Description Use of bed rail;Completed on regular bed   Sit to Stand   Assistance Needed Incidental touching;Adaptive equipment   CARE Score - Sit to Stand 4   Sit to Stand   Level of Assist Contact Guard Assist   Assistive Devices FWW   Chair/Bed-to-Chair Transfer   Assistance Needed Incidental touching   CARE Score - Chair/Bed-to-Chair Transfer 4   Chair/Bed-to-Chair Transfer   Level of Assist Contact Guard Assist   Transfer Type Stand Step Transfer   Assistive Devices FWW   Additional Description Cueing needed   Toilet Transfer   Assistance Needed Verbal cues   CARE Score - Toilet Transfer 4   Toilet Transfer   Level of Assist Stand By Assist   Transfer Type Stand Step Transfer   Adaptive Equipment Grab bars   Assistive Device Grab Bars   Additional Description Cueing needed   Toileting Hygiene   Assistance Needed Set-up / clean-up   CARE Score - Toileting Hygiene 5   Toileting Hygiene   Level of Assist Set up   Additional Description Grab bars   Car Transfer   Assistance Needed Incidental touching;Adaptive equipment   CARE Score - Car Transfer 4   Car Transfer   Level of Assist Contact Guard Assist   Transfer Type Stand Step Transfer   Assistive Device Transfer Bar   Additional Description Cueing needed   Walk 10 Feet   Assistance Needed Physical assistance;Adaptive equipment   Physical Assistance  Level 25% or less   CARE Score - Walk 10 Feet 3   Walk 50 Feet with Two Turns   Reason if not Attempted Safety concerns  (Fatigues easily, dec safety, performing NWB, despite cues at education for TTWB compliance. Impaired stability/ impaired efficiency)   CARE Score - Walk 50 Feet with Two Turns 88   Walk 150 Feet   Reason if not Attempted Safety concerns   CARE Score - Walk 150 Feet 88   Walking 10 Feet on Uneven Surfaces   Reason if not Attempted Safety concerns   CARE Score - Walking 10 Feet on Uneven Surfaces 88   Ambulation   Level of Assist Minimal Assist   Assistive Device FWW   Additional Description Cueing needed   Distance 15   1 Step (Curb)   Reason if not Attempted Safety concerns   CARE Score - 1 Step (Curb) 88   Curbs   Level of Assist Unable to Participate   4 Steps   Reason if not Attempted Safety concerns   CARE Score - 4 Steps 88   12 Steps   Reason if not Attempted Safety concerns   CARE Score - 12 Steps 88   Stairs   Level of Assist Unable to Participate   Picking Up Object   Assistance Needed Incidental touching;Adaptive equipment  (modified with reacher)   CARE Score - Picking Up Object 4   Wheel 50 Feet with Two Turns   Assistance Needed Set-up / clean-up   CARE Score - Wheel 50 Feet with Two Turns 5   Type of Wheelchair/Scooter Manual   Wheel 150 Feet   Assistance Needed Set-up / clean-up   CARE Score - Wheel 150 Feet 5   Type of Wheelchair/Scooter Manual   Wheelchair   Level of Assist Supervised;Set up   Type Manual   Wheelchair Distance 150   Interdisciplinary Communication   Patient Positioning in Bed PRAFOs needed  (LLE)   Patient Scheduling Information   PT Time 30/60 minutes   Problem List    Problems Pain;Impaired Transfers;Impaired Ambulation;Functional Strength Deficit;Impaired Balance;Impaired Coordination;Decreased Activity Tolerance;Motor Planning / Sequencing   Strengths & Barriers   Strengths Effective communication skills;Independent prior level of function;Motivated for self  "care and independence;Pleasant and cooperative;Willingly participates in therapeutic activities   Barriers Decreased endurance;Fatigue;Home accessibility;Impaired activity tolerance;Impaired balance;Limited mobility;Pain;Pain poorly managed  (TTWB L)   Benefit   Therapy Benefit Patient Would Benefit from Inpatient Rehabilitation Physical Therapy to Maximize Functional Isabela with ADLs, IADLs and Mobility.   Current Discharge Plan   Current Discharge Plan Return to Prior Living Situation   PT DME Recommendations   Wheelchair 16\" Width;Anti-tippers  (d/t TTWB LLE (not energy efficient))   Cushion Standard   Additional Equipment   (Pt has crutches (from the hospital), and access to her mother's FWW and commode per pt report)   Interdisciplinary Plan of Care Collaboration   IDT Collaboration with  Occupational Therapist;Nursing   Patient Position at End of Therapy In Bed;Tray Table within Reach;Call Light within Reach;Phone within Reach   Collaboration Comments POC, pain pill provided during session   Impairment Assessments   Impairment Assessments Sensation     Patient has been educated on the following items: physical therapy role, physical therapy plan of care, rehab expectations, goal setting, mobility needs, patient passport, fall risk and use of call light, and other: TTWB rationale and demo.     Assessment:    Patient is a 29 y.o. female with a diagnosis of History of lower leg fracture [Z87.81].     Currently, the patient has the following precautions: Fall Risk: (P) Poor balance, History of falls, Weight Bearing: (P) TTWB LLE, Comments: (P) PRAFO boot as tolerated in bed    Patient's PMH includes: Past Medical History[1]  Patient's Past Surgical History: Past Surgical History[2]    PT evaluation performed today; functional performance at today's assessment is as above. At baseline, the patient was independent with mobility.  The patient is limited by the following barriers: (P) Decreased endurance, " "Fatigue, Home accessibility, Impaired activity tolerance, Impaired balance, Limited mobility, Pain, Pain poorly managed (TTWB L)    Additional factors influencing patient status and prognosis include: prior level of function, PMH, and the above comorbidities.     The patient is performing well below their baseline level of function and will benefit from an interdisciplinary high intensity rehabilitation program to maximize functional independence, decrease burden of care, and support a safe return home with family and fiance support.    Plan:    Recommend Physical Therapy  minutes per day 5-7 days per week for 10 days for the following treatments: PT Group Therapy, PT Gait Training, PT Self Care/Home Eval, PT Therapeutic Exercises, PT Neuro Re-Ed/Balance, PT Therapeutic Activity, PT Manual Therapy, PT Evaluation, PT Wheelchair Management , and PT Community Integration.    Passport items to be completed:  Get in/out of bed safely, in/out of a vehicle, safely use mobility device, walk or wheel around home/community, navigate up and down stairs, show how to get up/down from the ground, ensure home is accessible, demonstrate HEP, complete caregiver training    Goals:  Long-term and short-term goals have been discussed with patient and they are in agreement.    Physical Therapy Problems (Active)       Problem: Mobility       Dates: Start:  06/28/25         Goal: STG-Within one week, patient will propel wheelchair community >150 ft mod I over indoor and outdoor surfaces        Dates: Start:  06/28/25            Goal: STG-Within one week, patient will ambulate household distance >25 ft x 2 with CGA consistently, while maintaining TTWB L with FWW       Dates: Start:  06/28/25            Goal: STG-Within one week, patient will ambulate up/down a 4\" curb with FWW min A        Dates: Start:  06/28/25            Goal: STG-Within one week, patient will ascend and descend 3 stairs with BHR min A, while maintaining TTWB  " "     Dates: Start:  06/28/25               Problem: Mobility Transfers       Dates: Start:  06/28/25         Goal: STG-Within one week, patient will transfer bed to chair mod I reach pivot, while maintaining TTWB       Dates: Start:  06/28/25               Problem: PT-Long Term Goals       Dates: Start:  06/28/25         Goal: LTG-By discharge, patient will propel wheelchair mod I >150 ft over outdoor surfaces       Dates: Start:  06/28/25            Goal: LTG-By discharge, patient will ambulate >30ft indoors mod I with FWW, while maintaining TTWB L       Dates: Start:  06/28/25            Goal: LTG-By discharge, patient will transfer one surface to another mod I with FWW       Dates: Start:  06/28/25            Goal: LTG-By discharge, patient will ambulate up/down 1 6\" step with FWW + 3 stairs with 1 crutch/ 1 HR CGA to enter/ exit mobile home       Dates: Start:  06/28/25                   [1]   Past Medical History:  Diagnosis Date    Anemia 9/4/2024    Coagulopathy (HCC) 9/4/2024    Gastroesophageal reflux disease 9/4/2024   [2]   Past Surgical History:  Procedure Laterality Date    TIBIA NAILING INTRAMEDULLARY Left 6/24/2025    Procedure: INSERTION, INTRAMEDULLARY ZOEY, LEFT TIBIA;  Surgeon: Rashaun Mcdaniel M.D.;  Location: SURGERY [facility];  Service: Orthopedics    HI UPPER GI ENDOSCOPY,DIAGNOSIS N/A 9/4/2024    Procedure: GASTROSCOPY;  Surgeon: Giuliano Fountain M.D.;  Location: SURGERY SAME DAY [facility];  Service: Gastroenterology    HI COLONOSCOPY-FLEXIBLE N/A 9/4/2024    Procedure: COLONOSCOPY;  Surgeon: Giuliano Fountain M.D.;  Location: SURGERY SAME DAY [facility];  Service: Gastroenterology     "

## 2025-06-29 NOTE — CARE PLAN
Problem: Urinary Elimination:  Goal: Ability to reestablish a normal urinary elimination pattern will improve  Outcome: Progressing  Note: Patient is continent of bladder this shift.  Will continue to monitor.     Problem: Skin Integrity  Goal: Skin integrity is maintained or improved  Outcome: Progressing     Problem: Fall Risk - Rehab  Goal: Patient will remain free from falls  Outcome: Progressing   The patient is Stable - Low risk of patient condition declining or worsening    Shift Goals  Clinical Goals: safety  Patient Goals: rest  Family Goals: n/a

## 2025-06-29 NOTE — THERAPY
Physical Therapy   Daily Treatment     Patient Name:  Linda Carpenter  Age:  29 y.o., Sex:  female  Medical Record #:  [id number]  Today's Date: 6/28/2025     Precautions  Precautions: Fall Risk, Weight Bearing, Orthopedic  Fall Risk: Poor balance  Weight Bearing: TTWB LLE    Subjective: Pt was agreeable to POC, reported overall dec in c/o pain to 5/10.     Objective:    06/28/25 1531   PT Charge Group   PT Therapeutic Exercise (Units) 1   PT Therapeutic Activities (Units) 1   PT Total Time Spent   PT Individual Total Time Spent (Mins) 30   Roll Left and Right   Level of Assist Set up   Additional Description Use of bed rail   Sit to Lying   Level of Assist Set up   Additional Description Use of bed rail   Lying to Sitting on Side of Bed   Level of Assist Set up   Additional Description Use of bed rail   Sit to Stand   Level of Assist Stand By Assist   Assistive Devices Grab Bar   Additional Description Cueing needed   Patient Scheduling Information   PT Time 30/60 minutes   Primary or Coverage PT Jennifer/ Lizzeth [email or web address])   Interdisciplinary Plan of Care Collaboration   Patient Position at End of Therapy In Bed;Call Light within Reach;Tray Table within Reach;Phone within Reach         Therapeutic Activities  Purpose: to improve performance and function of daily activities, to increase safety with activities of daily life and mobility related to activities of daily life, and  object from standing with reacher for IRF assessment: CGA, with unilateral UE support.   Interventions:   Bed mobility training (scooting, supine to sit, sit to supine, rolling)  Sit to stand training  Lateral scooting EOB set up left/ right, maintaining TTWB    Therapeutic Exercise  Purpose: to improve ROM/flexibility  Interventions:   Lower body exercises:   Supine program -   Quad sets 15x LLE  Seated program -   Ankle pumps, 1 set of 15 and Bilateral  Hip abduction, 1 set of 10, Bilateral, and Other Resistance light manual  resistance  Hip adduction, 1 set of 10, Bilateral, and Other Resistance gentle pillow squeezes  Long arc quad, 1 set of 10 and Bilateral  Marching, Reciprocal, 1 set of 10, and Other Resistance AAROM L  Hamstring curl, 1 set of 10 and Bilateral    Assessment: Pt completed seated exercises for ROM, with AAROM as needed for LLE. Pt remains motivated, and demo's recall of TTWB compliance.         Plan:   Reinforce TTWB with pregait (tends to perform NWB, with dec stability).  Progress to mod I wc level transfers in room as able.  Assess stair mobility (pt has 1 patio step, + 3 EVERT, and will likely need 1 HR + 1 crutch).    DME           Passport items to be completed:  Get in/out of bed safely, in/out of a vehicle, safely use mobility device, walk or wheel around home/community, navigate up and down stairs, show how to get up/down from the ground, ensure home is accessible, demonstrate HEP, complete caregiver training    Physical Therapy Problems (Active)       There are no active problems.

## 2025-06-29 NOTE — PROGRESS NOTES
NURSING DAILY NOTE    Name: Linda Carpenter   Date of Admission: 6/27/2025   Admitting Diagnosis: Fracture of left tibia  Attending Physician: NATIVIDAD MELENDEZ D.O.  Allergies: Cefaclor and Sulfamethoxazole w-trimethoprim    Safety  Patient Assist     Patient Precautions  Precautions: Fall Risk, Weight Bearing, Orthopedic  Fall Risk: Poor balance  Weight Bearing: TTWB LLE  Bed Transfer Status  Minimal Assist  Toilet Transfer Status   Minimal Assist  Assistive Devices  Walker - front wheel  Oxygen  None - Room Air  Diet/Therapeutic Dining  Current Diet Order   Procedures    Diet Order Diet: Regular     Pill Administration  whole  Agitated Behavioral Scale     ABS Level of Severity       Fall Risk  Has the patient had a fall this admission?      Rosie Holguin Fall Risk Scoring  14, MODERATE RISK  Fall Risk Safety Measures  bed alarm and chair alarm    Vitals  Temperature: 36.7 °C (98.1 °F)  Temp src: Temporal  Pulse: 87  Respiration: 16  Blood Pressure: (!) 128/90  Blood Pressure MAP (Calculated): 103 MM HG  BP Location: Right, Upper Arm  Patient BP Position: Minor's Position     Oxygen  Pulse Oximetry: 96 %  O2 (LPM): 0  O2 Delivery Device: None - Room Air    Bowel and Bladder  Last Bowel Movement  06/26/25 (per report)  Stool Type     Bowel Device     Continent  Bladder: Continent void   Bowel: No movement  Bladder Function  Number of Times Voided: 1  Urine Color: Yellow  Genitourinary Assessment   Bladder Assessment (WDL):  Within Defined Limits  Erickson Catheter: Not Applicable  Urine Color: Yellow  Bladder Device: Toilet  Time Void: Yes  Bladder Scan: Post Void  $ Bladder Scan Results (mL): 21    Skin  Carmelo Score   17  Sensory Interventions   Bed Types: Standard/Trauma Mattress  Skin Preventative Measures: Pillows in Use for Support / Positioning  Moisture Interventions         Pain  Pain Rating Scale  4 - Distracts me, can do usual activities  Pain Location  Leg, Foot  Pain Location  Orientation  Left  Pain Interventions   Cold Pack, Rest    ADLs    Bathing   Shower  Linen Change      Personal Hygiene     Chlorhexidine Bath      Oral Care     Teeth/Dentures     Shave     Nutrition Percentage Eaten     Environmental Precautions     Patient Turns/Positioning  Patient turns self independently side to side without assistance, to offload sacral area  Patient Turns Assistance/Tolerance     Bed Positions     Head of Bed Elevated         Psychosocial/Neurologic Assessment  Psychosocial Assessment  Psychosocial (WDL):  Within Defined Limits  Neurologic Assessment  Neuro (WDL): Within Defined Limits  Level of Consciousness: Alert  EENT (WDL):  Within Defined Limits    Cardio/Pulmonary Assessment  Edema   LLE Edema: Generalized  Respiratory Breath Sounds  RUL Breath Sounds: Clear  RML Breath Sounds: Clear  RLL Breath Sounds: Clear  MARIA E Breath Sounds: Clear  LLL Breath Sounds: Clear  Cardiac Assessment   Cardiac (WDL):  Within Defined Limits

## 2025-06-29 NOTE — PROGRESS NOTES
Rehab Progress Note     Encounter Date: 6/29/2025     CC: Left leg pain    Interval Events (Subjective)  NAEO.  Patient notes 10/10 pain in her left leg.  Taking pain medicines with slight improvement.    Objective:  VITAL SIGNS:   Vitals:    06/28/25 1748 06/29/25 0500 06/29/25 1010 06/29/25 1025   BP:  99/65 118/74    Pulse:  68 76    Resp: 16 16 18 20   Temp:  36.6 °C (97.9 °F) 36.2 °C (97.1 °F)    TempSrc:  Temporal Temporal    SpO2:  94% 96%    Weight:   86.7 kg (191 lb 2.2 oz)    Height:               Constitutional: No apparent distress  Cardiovascular: Regular rate and rhythm, normal S1/S2, no murmurs.  Thorax & Lungs: Clear to auscultation bilaterally   Abdomen: soft, non-tender, non-distended  Neurologic: Alert and oriented    Recent Results (from the past 72 hours)   CBC WITHOUT DIFFERENTIAL    Collection Time: 06/27/25  7:41 AM   Result Value Ref Range    WBC 8.6 4.8 - 10.8 K/uL    RBC 2.62 (L) 4.20 - 5.40 M/uL    Hemoglobin 9.7 (L) 12.0 - 16.0 g/dL    Hematocrit 29.3 (L) 37.0 - 47.0 %    .8 (H) 81.4 - 97.8 fL    MCH 37.0 (H) 27.0 - 33.0 pg    MCHC 33.1 32.2 - 35.5 g/dL    RDW 79.7 (H) 35.9 - 50.0 fL    Platelet Count 261 164 - 446 K/uL    MPV 10.6 9.0 - 12.9 fL   Basic Metabolic Panel    Collection Time: 06/27/25  7:41 AM   Result Value Ref Range    Sodium 138 135 - 145 mmol/L    Potassium 3.7 3.6 - 5.5 mmol/L    Chloride 108 96 - 112 mmol/L    Co2 21 20 - 33 mmol/L    Glucose 90 65 - 99 mg/dL    Bun 7 (L) 8 - 22 mg/dL    Creatinine 0.72 0.50 - 1.40 mg/dL    Calcium 8.2 (L) 8.5 - 10.5 mg/dL    Anion Gap 9.0 7.0 - 16.0   ESTIMATED GFR    Collection Time: 06/27/25  7:41 AM   Result Value Ref Range    GFR (CKD-EPI) 114 >60 mL/min/1.73 m 2   CBC with Differential    Collection Time: 06/28/25  5:31 AM   Result Value Ref Range    WBC 7.5 4.8 - 10.8 K/uL    RBC 2.69 (L) 4.20 - 5.40 M/uL    Hemoglobin 9.7 (L) 12.0 - 16.0 g/dL    Hematocrit 29.9 (L) 37.0 - 47.0 %    .2 (H) 81.4 - 97.8 fL    MCH  36.1 (H) 27.0 - 33.0 pg    MCHC 32.4 32.2 - 35.5 g/dL    RDW 78.9 (H) 35.9 - 50.0 fL    Platelet Count 257 164 - 446 K/uL    MPV 10.3 9.0 - 12.9 fL    Neutrophils-Polys 47.20 44.00 - 72.00 %    Lymphocytes 41.00 22.00 - 41.00 %    Monocytes 7.50 0.00 - 13.40 %    Eosinophils 3.20 0.00 - 6.90 %    Basophils 0.80 0.00 - 1.80 %    Immature Granulocytes 0.30 0.00 - 0.90 %    Nucleated RBC 0.00 0.00 - 0.20 /100 WBC    Neutrophils (Absolute) 3.54 1.82 - 7.42 K/uL    Lymphs (Absolute) 3.07 1.00 - 4.80 K/uL    Monos (Absolute) 0.56 0.00 - 0.85 K/uL    Eos (Absolute) 0.24 0.00 - 0.51 K/uL    Baso (Absolute) 0.06 0.00 - 0.12 K/uL    Immature Granulocytes (abs) 0.02 0.00 - 0.11 K/uL    NRBC (Absolute) 0.00 K/uL   Comp Metabolic Panel (CMP)    Collection Time: 06/28/25  5:31 AM   Result Value Ref Range    Sodium 138 135 - 145 mmol/L    Potassium 3.5 (L) 3.6 - 5.5 mmol/L    Chloride 105 96 - 112 mmol/L    Co2 22 20 - 33 mmol/L    Anion Gap 11.0 7.0 - 16.0    Glucose 80 65 - 99 mg/dL    Bun 6 (L) 8 - 22 mg/dL    Creatinine 0.64 0.50 - 1.40 mg/dL    Calcium 8.6 8.5 - 10.5 mg/dL    Correct Calcium 9.5 8.5 - 10.5 mg/dL    AST(SGOT) 22 12 - 45 U/L    ALT(SGPT) 16 2 - 50 U/L    Alkaline Phosphatase 83 30 - 99 U/L    Total Bilirubin 0.3 0.1 - 1.5 mg/dL    Albumin 2.9 (L) 3.2 - 4.9 g/dL    Total Protein 5.8 (L) 6.0 - 8.2 g/dL    Globulin 2.9 1.9 - 3.5 g/dL    A-G Ratio 1.0 g/dL   Magnesium    Collection Time: 06/28/25  5:31 AM   Result Value Ref Range    Magnesium 1.6 1.5 - 2.5 mg/dL   Phosphorus    Collection Time: 06/28/25  5:31 AM   Result Value Ref Range    Phosphorus 3.8 2.5 - 4.5 mg/dL   TSH with Reflex to FT4    Collection Time: 06/28/25  5:31 AM   Result Value Ref Range    TSH 1.590 0.380 - 5.330 uIU/mL   Vitamin D, 25-hydroxy (blood)    Collection Time: 06/28/25  5:31 AM   Result Value Ref Range    25-Hydroxy   Vitamin D 25 15 (L) 30 - 100 ng/mL   ESTIMATED GFR    Collection Time: 06/28/25  5:31 AM   Result Value Ref Range     GFR (CKD-EPI) 121 >60 mL/min/1.73 m 2       Current Facility-Administered Medications   Medication Frequency    metaxalone (Skelaxin) tablet 400 mg TID PRN    oxyCODONE immediate release (Roxicodone) tablet 10 mg Q3HRS PRN    Or    oxyCODONE immediate-release (Roxicodone) tablet 15 mg Q3HRS PRN    lidocaine (Asperflex) 4 % patch 1-2 Patch Q24HRS PRN    hydrALAZINE (Apresoline) tablet 25 mg Q8HRS PRN    senna-docusate (Pericolace Or Senokot S) 8.6-50 MG per tablet 2 Tablet Q EVENING    And    polyethylene glycol/lytes (Miralax) Packet 1 Packet QDAY PRN    lactulose 20 GM/30ML solution 30 mL QDAY PRN    docusate sodium (Enemeez) enema 283 mg QDAY PRN    bisacodyl EC (Dulcolax) tablet 5 mg QDAY PRN    magnesium hydroxide (Milk Of Magnesia) suspension 30 mL QDAY PRN    sodium phosphate enema 1 Enema QDAY PRN    carboxymethylcellulose (Refresh Tears) 0.5 % ophthalmic drops 1 Drop PRN    benzocaine-menthol (Cepacol) lozenge 1 Lozenge Q2HRS PRN    mag hydrox-al hydrox-simeth (Maalox Plus Es Or Mylanta Ds) suspension 20 mL Q2HRS PRN    ondansetron (Zofran ODT) dispertab 4 mg 4X/DAY PRN    Or    ondansetron (Zofran) syringe/vial injection 4 mg 4X/DAY PRN    traZODone (Desyrel) tablet 50 mg QHS PRN    sodium chloride (Ocean) 0.65 % nasal spray 2 Spray PRN    acetaminophen (Tylenol) tablet 1,000 mg Q6HRS PRN    enoxaparin (Lovenox) inj 40 mg DAILY AT 1800    escitalopram (Lexapro) tablet 10 mg Q EVENING    gabapentin (Neurontin) capsule 100 mg TID    ibuprofen (Motrin) tablet 800 mg TID PRN    omeprazole (PriLOSEC) capsule 40 mg DAILY    polyethylene glycol/lytes (Miralax) Packet 1 Packet DAILY    vitamin D3 (Cholecalciferol) tablet 5,000 Units DAILY       Orders Placed This Encounter   Procedures    Diet Order Diet: Regular     Standing Status:   Standing     Number of Occurrences:   1     Diet::   Regular [1]         Intake/Output Summary (Last 24 hours) at 6/29/2025 1426  Last data filed at 6/29/2025 1000  Gross per 24 hour    Intake 600 ml   Output --   Net 600 ml         Assessment:  Active Hospital Problems    Diagnosis     *Fracture of left tibia     History of lower leg fracture     Vitamin D insufficiency     Alcohol withdrawal syndrome without complication (HCC)     Gastroesophageal reflux disease     Cirrhosis (HCC)        Medical Decision Making and Plan:    L traumatic distal tibial fracture s/p ORIF with IMN Dr. Mcdaniel 6/20/25  PT and OT for mobility and ADLs. Per guidelines, 15 hours per week between PT, OT and/or SLP.  Follow-up Ortho  TTWB LLE     Pain - Scheduled Tylenol, Shan. S/p IV Ketorolac. PRN ibuprofen.  6/28 increase PRN Oxycodone to 10-15 mg every 3 hours as needed.  Discussed that this would only be increased for a few days, then would reduce oxycodone to a lower dose     EtOH Use + Chronic Tremors - Thiamine, folate, MVN     Mood Disorder - Continue Lexapro     Hypokalemia  -Potassium borderline low 3.5 on 6/28/2025.  Will replete with 20 milliequivalents potassium     ABLA + Macrocytic Anemia - hemoglobin 9.7 on 6/28/2025, stable     Vitamin D Deficiency -15 on 6/28/2025.  Continue vitamin D supplementation 5000 units daily     GERD - PPI     Bowel - Patient on Senna-docusate for constipation prophylaxis.      Bladder - TV/PVR/BS PRN    No problem-specific Assessment & Plan notes found for this encounter.      Total time:  37 minutes. Time spent included pre-rounding review of vitals and tests, unit/floor time, face-to-face time with the patient including physical examination, care coordination, counseling of patient and/or family, ordering medications/procedures/tests, discussion with CM, PT, OT, SLP and/or other healthcare providers, and documentation in the electronic medical record. Topics discussed included pain control    IDamaris M.D., personally performed a complete drug regimen review and no potential clinically significant medication issues were identified.    Damaris Regan M.D.  Physical  medicine rehabilitation  [facility]

## 2025-06-29 NOTE — THERAPY
"Occupational Therapy  Daily Treatment     Patient Name:  Linda Carpenter  Age:  29 y.o., Sex:  female  Medical Record #:  [id number]  Today's Date:  6/29/2025    Precautions  Precautions: Fall Risk, Weight Bearing  Fall Risk: Poor balance, History of falls  Weight Bearing: TTWB LLE  Comments: PRAFO boot as tolerated in bed    Subjective: \"I need to work on my upper body strengthening.\"      Objective:    06/29/25 1401   OT Charge Group   OT Self Care / ADL (Units) 1   OT Therapeutic Exercise (Units) 1   OT Total Time Spent   OT Individual Total Time Spent (Mins) 30   Pain 0 - 10 Group   Location Leg;Foot   Location Orientation Left   Pain Rating Scale (NPRS) 8   Description Aching;Cramping   Grooming   Level of Assist Independent   Patient Position Seated   Additional Description Hand hygiene   Toilet Transfer   Level of Assist Stand By Assist   Transfer Type Stand Pivot Transfer   Adaptive Equipment Grab bars   Assistive Device Wheelchair;Grab Bars   Toileting Hygiene   Level of Assist Set up   Additional Description Grab bars   Sit to Lying   Level of Assist Set up   Additional Description Head of bed elevated;Use of bed rail   Lying to Sitting on Side of Bed   Level of Assist Set up   Additional Description Head of bed elevated;Use of bed rail   Chair/Bed-to-Chair Transfer   Level of Assist Stand By Assist   Transfer Type Stand Pivot Transfer   Assistive Devices Wheelchair   Interdisciplinary Plan of Care Collaboration   Patient Position at End of Therapy In Bed;Bed Alarm On;Call Light within Reach;Tray Table within Reach;Phone within Reach         Therapeutic Activities  Purpose: to improve performance and function of daily activities, to provide patient and family education, and to increase safety with activities of daily life and mobility related to activities of daily life  Interventions:  Bed/chair transfer training  Bed mobility training (scooting, supine to sit, sit to supine, rolling)  Patient or " family education  Pt reported that mother is letting her borrow her FWW, BSC to go over the toilet and SC- pt unsure if it is a SC or TTB- will clarify with mother   Educated pt on difference between the two using pictures. Discussed that TTB is much easier to use and safer at this time due to TTWB status and having a SC is much more challenging for getting in/out of tub/shower at home. Pt will find out what mother has so that we can practice both transfers- SC and TTB into/out of the tub next session.     Therapeutic Exercise  Purpose: to improve strength and to improve functional endurance  Interventions:   Upper/lower body exercises:   Seated program -   Educated pt on use of blue thera-band exercises while in fowlers position in bed. Instructed pt on various ways to hold band and how to change resistance based on where she holds it. Demonstrated various UB/LB exercises I.e. shoulder flexion/abduction, tricep press, chest flys, front arm raise, knee extension, ankle flexion    Activities of Daily Living (ADL's)  Purpose: to improve function, safety, and independence with activities of daily living and to provide patient and family education  Interventions:   Toilet Transfers  Toilet Hygiene    Assessment:    Pt tolerated session well. Pt sleeping upon arrival- pt reported high pain at this time but has been getting a stronger pain medication that is bringing the pain down slightly- where before it was not even touching the pain. Increased time taken to discuss pt's home set-up- pt reported that w/c will fit within the home and into doorways/bathrooms. Pt's mother letting her borrow DME for home- FWW, BSC and shower seat (unsure if SC or TTB). Pt will need to practice shower xfer with either TTB or SC prior to d/c. Pt limited by pain at this time in LLE- discussed keeping it elevated- ice issued to pt to put on L foot and knee- nurse notified of pt's pain levels.     Strengths: Able to follow instructions,  Adequate strength, Alert and oriented, Independent prior level of function, Making steady progress towards goals, Motivated for self care and independence, Pleasant and cooperative, Supportive family, Willingly participates in therapeutic activities  Barriers: Impaired balance, Pain poorly managed    Plan:  *practice dry shower xfer In/out of tub using TTB or SC based on what mother has for pt to use*   ADL's/IADL's from w/c and FWW level, standing bal/tolerance, functional mobility, strengthening/endurance, pain mgmt     Occupational Therapy Goals (Active)       Problem: Dressing       Dates: Start:  06/28/25         Goal: STG-Within one week, patient will dress LB with SBA       Dates: Start:  06/28/25               Problem: Functional Transfers       Dates: Start:  06/28/25         Goal: STG-Within one week, patient will transfer to toilet with SBA       Dates: Start:  06/28/25               Problem: OT Long Term Goals       Dates: Start:  06/28/25         Goal: LTG-By discharge, patient will complete basic self care tasks with mod I        Dates: Start:  06/28/25            Goal: LTG-By discharge, patient will perform bathroom transfers with mod I       Dates: Start:  06/28/25            Goal: LTG-By discharge, patient will complete basic home management with mod I       Dates: Start:  06/28/25               Problem: Toileting       Dates: Start:  06/28/25         Goal: STG-Within one week, patient will complete toileting tasks with SBA       Dates: Start:  06/28/25

## 2025-06-29 NOTE — PROGRESS NOTES
NURSING DAILY NOTE    Name: Linda Carpenter   Date of Admission: 6/27/2025   Admitting Diagnosis: Fracture of left tibia  Attending Physician: NATIVIDAD MELENDEZ D.O.  Allergies: Cefaclor and Sulfamethoxazole w-trimethoprim    Safety  Patient Assist     Patient Precautions  Precautions: Fall Risk, Weight Bearing  Fall Risk: Poor balance, History of falls  Weight Bearing: TTWB LLE  Comments: PRAFO boot as tolerated in bed  Nursing Mobility:  Bed Transfer Status:    Toilet Transfer Status: Minimal Assist  Therapy Mobility:  Bed Transfer Status: Contact Guard Assist, Stand Step Transfer, FWW  Toilet Transfer Status: Stand By Assist, Stand Step Transfer, Grab Bars  Assistive Devices  Gait Belt, Wheelchair, Rails  Oxygen  None - Room Air  Diet/Therapeutic Dining  Current Diet Order   Procedures    Diet Order Diet: Regular     Pill Administration  whole  Agitated Behavioral Scale     ABS Level of Severity       Fall Risk  Has the patient had a fall this admission?      Rosie Holguin Fall Risk Scoring  14, MODERATE RISK  Fall Risk Safety Measures  bed alarm, chair alarm, poor balance, and low vision/hearing    Vitals  Temperature: 36.7 °C (98.1 °F)  Temp src: Temporal  Pulse: 87  Respiration: 16  Blood Pressure: (!) 128/90  Blood Pressure MAP (Calculated): 103 MM HG  BP Location: Right, Upper Arm  Patient BP Position: Minor's Position     Oxygen  Pulse Oximetry: 96 %  O2 (LPM): 0  O2 Delivery Device: None - Room Air    Bowel and Bladder  Last Bowel Movement  06/26/25  Stool Type     Bowel Device     Continent  Bladder: Continent void   Bowel: No movement  Bladder Function  Number of Times Voided: 1  Time Void: Yes  Bladder Device: Toilet  Urine Color: Yellow  Urine Clarity: Clear  Genitourinary Assessment   Bladder Assessment (WDL):  Within Defined Limits  Erickson Catheter: Not Applicable  Urine Color: Yellow  Urine Clarity: Clear  Bladder Device: Toilet  Time Void: Yes  Bladder Scan: Post Void  $ Bladder Scan Results  (mL): 21    Skin  Carmelo Score   17  Sensory Interventions   Bed Types: Standard/Trauma Mattress  Skin Preventative Measures: Pillows in Use to Float Heels  Moisture Interventions         Pain  Pain Rating Scale  8 - Awful, hard to do anything  Pain Location  Foot, Leg  Pain Location Orientation  Left  Pain Interventions   Medication (see MAR)    ADLs    Bathing   Shower  Linen Change      Grooming     Oral Care     Teeth/Dentures     Nutrition Intake  Oral Nutrition Intake  P.O.: 240 mL  Meal Type: Breakfast  Percentage Consumed (%): 75 %     Environmental Precautions     Patient Turns/Positioning  Patient turns self independently side to side without assistance, to offload sacral area  Patient Turns Assistance/Tolerance  Assistance of One      Psychosocial/Neurologic Assessment  Psychosocial Assessment  Psychosocial (WDL):  Within Defined Limits  Neurologic Assessment  Neuro (WDL): Within Defined Limits  Level of Consciousness: Alert  EENT (WDL):  Within Defined Limits    Cardio/Pulmonary Assessment  Edema   LLE Edema: Generalized  Respiratory Breath Sounds  RUL Breath Sounds: Clear  RML Breath Sounds: Clear  RLL Breath Sounds: Clear  MARIA E Breath Sounds: Clear  LLL Breath Sounds: Clear  Cardiac Assessment   Cardiac (WDL):  Within Defined Limits

## 2025-06-30 ENCOUNTER — APPOINTMENT (OUTPATIENT)
Dept: PHYSICAL THERAPY | Facility: REHABILITATION | Age: 30
DRG: 560 | End: 2025-06-30
Attending: PHYSICAL MEDICINE & REHABILITATION
Payer: MEDICAID

## 2025-06-30 ENCOUNTER — APPOINTMENT (OUTPATIENT)
Dept: OCCUPATIONAL THERAPY | Facility: REHABILITATION | Age: 30
DRG: 560 | End: 2025-06-30
Attending: PHYSICAL MEDICINE & REHABILITATION
Payer: MEDICAID

## 2025-06-30 VITALS
OXYGEN SATURATION: 93 % | BODY MASS INDEX: 32.63 KG/M2 | HEART RATE: 98 BPM | SYSTOLIC BLOOD PRESSURE: 118 MMHG | TEMPERATURE: 97.3 F | WEIGHT: 191.14 LBS | HEIGHT: 64 IN | RESPIRATION RATE: 18 BRPM | DIASTOLIC BLOOD PRESSURE: 82 MMHG

## 2025-06-30 PROCEDURE — 97535 SELF CARE MNGMENT TRAINING: CPT

## 2025-06-30 PROCEDURE — 97530 THERAPEUTIC ACTIVITIES: CPT | Mod: CQ

## 2025-06-30 PROCEDURE — 700102 HCHG RX REV CODE 250 W/ 637 OVERRIDE(OP): Performed by: STUDENT IN AN ORGANIZED HEALTH CARE EDUCATION/TRAINING PROGRAM

## 2025-06-30 PROCEDURE — 97110 THERAPEUTIC EXERCISES: CPT

## 2025-06-30 PROCEDURE — A9270 NON-COVERED ITEM OR SERVICE: HCPCS | Performed by: STUDENT IN AN ORGANIZED HEALTH CARE EDUCATION/TRAINING PROGRAM

## 2025-06-30 PROCEDURE — 99233 SBSQ HOSP IP/OBS HIGH 50: CPT | Performed by: PHYSICAL MEDICINE & REHABILITATION

## 2025-06-30 PROCEDURE — 770010 HCHG ROOM/CARE - REHAB SEMI PRIVAT*

## 2025-06-30 PROCEDURE — 97116 GAIT TRAINING THERAPY: CPT | Mod: CQ

## 2025-06-30 PROCEDURE — 94760 N-INVAS EAR/PLS OXIMETRY 1: CPT

## 2025-06-30 PROCEDURE — 97530 THERAPEUTIC ACTIVITIES: CPT

## 2025-06-30 PROCEDURE — 97110 THERAPEUTIC EXERCISES: CPT | Mod: CQ

## 2025-06-30 PROCEDURE — 700111 HCHG RX REV CODE 636 W/ 250 OVERRIDE (IP): Mod: JZ | Performed by: PHYSICAL MEDICINE & REHABILITATION

## 2025-06-30 PROCEDURE — 700102 HCHG RX REV CODE 250 W/ 637 OVERRIDE(OP): Performed by: PHYSICAL MEDICINE & REHABILITATION

## 2025-06-30 PROCEDURE — A9270 NON-COVERED ITEM OR SERVICE: HCPCS | Performed by: PHYSICAL MEDICINE & REHABILITATION

## 2025-06-30 RX ADMIN — OXYCODONE HYDROCHLORIDE 15 MG: 10 TABLET ORAL at 06:47

## 2025-06-30 RX ADMIN — ESCITALOPRAM OXALATE 10 MG: 10 TABLET ORAL at 21:36

## 2025-06-30 RX ADMIN — POLYETHYLENE GLYCOL 3350 1 PACKET: 17 POWDER, FOR SOLUTION ORAL at 08:53

## 2025-06-30 RX ADMIN — DOCUSATE SODIUM 50MG AND SENNOSIDES 8.6MG 2 TABLET: 8.6; 5 TABLET, FILM COATED ORAL at 21:36

## 2025-06-30 RX ADMIN — OXYCODONE HYDROCHLORIDE 15 MG: 10 TABLET ORAL at 21:36

## 2025-06-30 RX ADMIN — OXYCODONE HYDROCHLORIDE 15 MG: 10 TABLET ORAL at 09:50

## 2025-06-30 RX ADMIN — OXYCODONE HYDROCHLORIDE 15 MG: 10 TABLET ORAL at 17:38

## 2025-06-30 RX ADMIN — OMEPRAZOLE 40 MG: 20 CAPSULE, DELAYED RELEASE ORAL at 08:53

## 2025-06-30 RX ADMIN — GABAPENTIN 100 MG: 100 CAPSULE ORAL at 14:18

## 2025-06-30 RX ADMIN — Medication 5000 UNITS: at 08:53

## 2025-06-30 RX ADMIN — OXYCODONE HYDROCHLORIDE 15 MG: 10 TABLET ORAL at 00:09

## 2025-06-30 RX ADMIN — OXYCODONE HYDROCHLORIDE 15 MG: 10 TABLET ORAL at 14:18

## 2025-06-30 RX ADMIN — GABAPENTIN 100 MG: 100 CAPSULE ORAL at 21:36

## 2025-06-30 RX ADMIN — ENOXAPARIN SODIUM 40 MG: 100 INJECTION SUBCUTANEOUS at 17:35

## 2025-06-30 RX ADMIN — GABAPENTIN 100 MG: 100 CAPSULE ORAL at 08:53

## 2025-06-30 ASSESSMENT — PAIN DESCRIPTION - PAIN TYPE
TYPE: ACUTE PAIN

## 2025-06-30 NOTE — PROGRESS NOTES
NURSING DAILY NOTE    Name: Linda Carpenter   Date of Admission: 6/27/2025   Admitting Diagnosis: Fracture of left tibia  Attending Physician: NATIVIDAD MELENDEZ D.O.  Allergies: Cefaclor and Sulfamethoxazole w-trimethoprim    Safety  Patient Assist     Patient Precautions  Precautions: Fall Risk, Weight Bearing  Fall Risk: Poor balance, History of falls  Weight Bearing: TTWB LLE  Comments: PRAFO boot as tolerated in bed  Bed Transfer Status  Stand By Assist  Toilet Transfer Status   Stand By Assist  Assistive Devices  Gait Belt, Rails, Wheelchair, Wheelchair push  Oxygen  None - Room Air  Diet/Therapeutic Dining  Current Diet Order   Procedures    Diet Order Diet: Regular     Pill Administration  whole  Agitated Behavioral Scale     ABS Level of Severity       Fall Risk  Has the patient had a fall this admission?      Rosie Holguin Fall Risk Scoring  14, MODERATE RISK  Fall Risk Safety Measures  bed alarm, chair alarm, and poor balance    Vitals  Temperature: 37.2 °C (98.9 °F)  Temp src: Temporal  Pulse: 89  Respiration: 18  Blood Pressure: 131/85  Blood Pressure MAP (Calculated): 100 MM HG  BP Location: Right, Upper Arm  Patient BP Position: Minor's Position     Oxygen  Pulse Oximetry: 96 %  O2 (LPM): 0  O2 Delivery Device: None - Room Air    Bowel and Bladder  Last Bowel Movement  06/29/25  Stool Type  Type 5: Soft blob with clear cut edges (passed easily)  Bowel Device  Toilet  Continent  Bladder: Continent void   Bowel: No movement  Bladder Function  Number of Times Voided: 1  Urine Color: Yellow  Urine Clarity: Clear  Genitourinary Assessment   Bladder Assessment (WDL):  Within Defined Limits  Erickson Catheter: Not Applicable  Urine Color: Yellow  Urine Clarity: Clear  Bladder Device: Toilet  Time Void: Yes  Bladder Scan: Post Void  $ Bladder Scan Results (mL): 21    Skin  Carmelo Score   17  Sensory Interventions   Bed Types: Standard/Trauma Mattress  Skin Preventative Measures: Pillows in Use to Float  Heels  Moisture Interventions         Pain  Pain Rating Scale  10 - As bad as it could be, nothing else matters  Pain Location  Tibia, Knee, Leg  Pain Location Orientation  Left  Pain Interventions   Medication (see MAR), Rest    ADLs    Bathing   Shower  Linen Change      Personal Hygiene     Chlorhexidine Bath      Oral Care  Brushed Teeth  Teeth/Dentures     Shave     Nutrition Percentage Eaten     Environmental Precautions     Patient Turns/Positioning  Patient turns self independently side to side without assistance, to offload sacral area  Patient Turns Assistance/Tolerance  Assistance of One  Bed Positions     Head of Bed Elevated         Psychosocial/Neurologic Assessment  Psychosocial Assessment  Psychosocial (WDL):  Within Defined Limits  Neurologic Assessment  Neuro (WDL): Within Defined Limits  Level of Consciousness: Alert  EENT (WDL):  Within Defined Limits    Cardio/Pulmonary Assessment  Edema   LLE Edema: Generalized  Respiratory Breath Sounds  RUL Breath Sounds: Clear  RML Breath Sounds: Clear  RLL Breath Sounds: Clear  MARIA E Breath Sounds: Clear  LLL Breath Sounds: Clear  Cardiac Assessment   Cardiac (WDL):  Within Defined Limits

## 2025-06-30 NOTE — CARE PLAN
Problem: Fall Risk - Rehab  Goal: Patient will remain free from falls  Outcome: Progressing     Problem: Pain - Standard  Goal: Alleviation of pain or a reduction in pain to the patient’s comfort goal  Outcome: Not Progressing     Problem: Skin Integrity  Goal: Skin integrity is maintained or improved  Outcome: Progressing  Note: Interventions were implemented to protect and preserve the patient's skin.  Barrier cream was used on irritated skin and pillows were used to float patient's heels while in bed.    The patient is Stable - Low risk of patient condition declining or worsening    Shift Goals  Clinical Goals: safety  Patient Goals: rest, pain relief  Family Goals: n/a

## 2025-06-30 NOTE — DISCHARGE PLANNING
"Case Management/IDT follow up.   Projected dc date:  IDT continues to recommend IRF level of care as patient continue to make progress with all therapies.     DC needs  No HHC due to pts insurance, pt agreeable to any in- net OP PT location in [place].   DME: Per PT recs 16\" WC. Per pt, uses left elevating leg rest and a standard foot rest for the right side.     Follow up: PCP and Ortho     I met with patient and he child at bedside, provided update from IDT and discussed plan of care.  Pt is in agreement w/ plan.     Plan: [facility] with arthur/Dat and their 4y.o. child. Son is currently being cared for by pt's mother/Terri Carpenter [phone] who lives in [place]. 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 Resume crutches.   "

## 2025-06-30 NOTE — CARE PLAN
Problem: Dressing  Goal: STG-Within one week, patient will dress LB with SBA  Outcome: Not Met  Note: Not yet reassessed     Problem: Toileting  Goal: STG-Within one week, patient will complete toileting tasks with SBA  Outcome: Met     Problem: Functional Transfers  Goal: STG-Within one week, patient will transfer to toilet with SBA  Outcome: Met

## 2025-06-30 NOTE — PROGRESS NOTES
NURSING DAILY NOTE    Name: Linda Carpenter   Date of Admission: 6/27/2025   Admitting Diagnosis: Fracture of left tibia  Attending Physician: Alondra Rausch D.o.  Allergies: Cefaclor and Sulfamethoxazole w-trimethoprim    Safety  Patient Assist     Patient Precautions     Precaution Comments     Bed Transfer Status     Toilet Transfer Status      Assistive Devices  Wheelchair, Rails  Oxygen  None - Room Air  Diet/Therapeutic Dining  Current Diet Order   Procedures    Diet Order Diet: Regular     Pill Administration  whole  Agitated Behavioral Scale     ABS Level of Severity       Fall Risk  Has the patient had a fall this admission?   No  Rosie Holguin Fall Risk Scoring  14, MODERATE RISK  Fall Risk Safety Measures  bed alarm, chair alarm, poor balance, and low vision/ hearing    Vitals  Temperature: 36.3 °C (97.3 °F)  Temp src: Oral  Pulse: 98  Respiration: 18  Blood Pressure: 118/82  Blood Pressure MAP (Calculated): 94 MM HG  BP Location: Right, Upper Arm  Patient BP Position: Supine     Oxygen  Pulse Oximetry: 93 %  O2 (LPM): 0  O2 Delivery Device: None - Room Air    Bowel and Bladder  Last Bowel Movement  06/29/25  Stool Type  Type 5: Soft blob with clear cut edges (passed easily)  Bowel Device  Toilet  Continent  Bladder: Continent void   Bowel: No movement  Bladder Function  Number of Times Voided: 1  Urine Color: Unable To Evaluate  Urine Clarity: Clear  Genitourinary Assessment   Bladder Assessment (WDL):  Within Defined Limits  Erickson Catheter: Not Applicable  Urine Color: Unable To Evaluate  Urine Clarity: Clear  Bladder Device: Toilet  Time Void: Yes  Bladder Scan: Post Void  $ Bladder Scan Results (mL): 21    Skin  Carmelo Score   17  Sensory Interventions   Bed Types: Standard/Trauma Mattress  Skin Preventative Measures: Pillows in Use for Support / Positioning  Moisture Interventions         Pain  Pain Rating Scale  9 - Can't bear the  "pain, unable to do anything  Pain Location  Leg  Pain Location Orientation  Left  Pain Interventions   Medication (see MAR)    ADLs    Bathing   Shower  Linen Change      Personal Hygiene     Chlorhexidine Bath      Oral Care  Brushed Teeth  Teeth/Dentures     Shave     Nutrition Percentage Eaten     Environmental Precautions     Patient Turns/Positioning  Patient turns self independently side to side without assistance, to offload sacral area, Supine  Patient Turns Assistance/Tolerance  Assistance of One  Bed Positions     Head of Bed Elevated         Psychosocial/Neurologic Assessment  Psychosocial Assessment  Psychosocial (WDL):  Within Defined Limits  Neurologic Assessment  Neuro (WDL): Exceptions to WDL  Level of Consciousness: Alert  Orientation Level: Oriented X4  Cognition: Follows commands  Speech: Clear  Motor Function, Sensation & Ataxia Assessment: Sensation  LLE Sensation: Tingling (\"pins and needles\")  EENT (WDL):  Within Defined Limits    Cardio/Pulmonary Assessment  Edema   LLE Edema: Generalized  Respiratory Breath Sounds  RUL Breath Sounds: Clear  RML Breath Sounds: Clear  RLL Breath Sounds: Clear  MARIA E Breath Sounds: Clear  LLL Breath Sounds: Clear  Cardiac Assessment   Cardiac (WDL):  Within Defined Limits         "

## 2025-06-30 NOTE — CARE PLAN
The patient is Stable - Low risk of patient condition declining or worsening    Shift Goals  Clinical Goals: safety  Patient Goals: rest  Family Goals: n/a    Progress made toward(s) clinical / shift goals:  2    Problem: Pain - Standard  Goal: Alleviation of pain or a reduction in pain to the patient’s comfort goal  Outcome: Progressing:Medicated per MAR for pain.     Problem: Skin Integrity  Goal: Skin integrity is maintained or improved  Outcome: Progressing: dressing to LLE changed, no drainage or s/s infection noted. Updated photos taken.

## 2025-06-30 NOTE — THERAPY
"Physical Therapy   Daily Treatment     Patient Name:  Linda Carpenter  Age:  29 y.o., Sex:  female  Medical Record #:  [id number]  Today's Date: 6/30/2025     Precautions  Precautions: Fall Risk, Weight Bearing  Fall Risk: Poor balance, History of falls  Weight Bearing: TTWB LLE  Comments: PRAFO boot as tolerated in bed    Subjective: Pt seated in w/c upon arrival, agreeable to session.     Objective:    06/30/25 1301   PT Charge Group   PT Gait Training (Units) 1   PT Therapeutic Exercise (Units) 1   PT Therapeutic Activities (Units) 2   Supervising Physical Therapist Jennifer Batista   PT Total Time Spent   PT Individual Total Time Spent (Mins) 60   Pain 0 - 10 Group   Location Leg   Location Orientation Left   Cognition    Level of Consciousness Alert   Chair/Bed-to-Chair Transfer   Level of Assist Stand By Assist   Transfer Type Stand Pivot Transfer   Assistive Devices FWW   Ambulation   Level of Assist Stand By Assist   Assistive Device FWW   Additional Description Extra time needed   Distance 50+30   Stairs   Level of Assist Moderate Assist   Stair Height 4\" step   Additional Description L handrail;Cueing needed;Extra time needed   Stairs Amount 2   Wheelchair   Level of Assist Independent   Type Manual   PT DME Recommendations   Wheelchair 16\" Width;David Height (16\"-17\");Lightweight;Elevating Leg Rests  (left elevating leg rest)   Cushion Standard   Assistive Device Front Wheeled Walker   Interdisciplinary Plan of Care Collaboration   Patient Position at End of Therapy In Bed;Call Light within Reach;Tray Table within Reach;Phone within Reach;Family / Friend in Room         Therapeutic Activities  Purpose: to improve performance and function of daily activities, to provide patient and family education, and to increase safety with activities of daily life and mobility related to activities of daily life  Interventions:   Bed/chair transfer training  Bed mobility training (scooting, supine to sit, sit to supine, " "rolling)  Self-care regarding rewrapping LLE for edema control  PTA performed wound dressing change at quad and rewrap w/ wrapping gauze    Therapeutic Exercise  Purpose: to improve ROM/flexibility  Interventions:   Lower body exercises:   Quad set w/ hold  Ankle pump  DF stretch w/ belt    Gait Training  Purpose: to improve functional ambulation and to improve walking endurance  Interventions:   Safe use of device  Facilitation of gait  Deviations (laterality in comments):  NWB LLE  Stair Training  Purpose: to improve functional use of stairs and to improve stair climbing endurance  Interventions:   Step up/downs    Assessment: pt demonstrating good understanding re: CLOF and is Kamari at w/c level, SBA at FWW level. Prefers NWB v.s TTWB due to pain at knee ext. Required modA at stairs w/ LHR, will attempt 6\" when able.     Strengths: Effective communication skills, Independent prior level of function, Motivated for self care and independence, Pleasant and cooperative, Willingly participates in therapeutic activities  Barriers: Decreased endurance, Fatigue, Home accessibility, Impaired activity tolerance, Impaired balance, Limited mobility, Pain, Pain poorly managed (TTWB L)    Plan:   Family training with sig other Wed 12:30-1pm with emphasis on stairs.   Ongoing EVERT training with BHR ->progressing to 1HR + 1 crutch.   Ongoing household amb practice for safety with TTWB.    DME    PT DME Recommendations  Wheelchair: (P) 16\" Width, David Height (16\"-17\"), Lightweight, Elevating Leg Rests (left elevating leg rest)  Cushion: (P) Standard  Assistive Device: (P) Front Wheeled Walker  Additional Equipment:  (Pt has crutches (from the hospital), and access to her mother's FWW and commode per pt report)      Passport items to be completed:  Get in/out of bed safely, in/out of a vehicle, safely use mobility device, walk or wheel around home/community, navigate up and down stairs, show how to get up/down from the ground, " "ensure home is accessible, demonstrate HEP, complete caregiver training    Physical Therapy Problems (Active)       Problem: Mobility       Dates: Start:  06/28/25         Goal: STG-Within one week, patient will propel wheelchair community >150 ft mod I over indoor and outdoor surfaces        Dates: Start:  06/28/25         Goal Note filed on 06/30/25 1228 by Jennifer Batista DPT       Indoor- met, outdoors TBD              Goal: STG-Within one week, patient will ambulate household distance >25 ft x 2 with CGA consistently, while maintaining TTWB L with FWW       Dates: Start:  06/28/25         Goal Note filed on 06/30/25 1228 by Jennifer Batista DPT       Pt prefers NWB over TTWB demo'ing dec stability and safety              Goal: STG-Within one week, patient will ambulate up/down a 4\" curb with FWW min A        Dates: Start:  06/28/25         Goal Note filed on 06/30/25 1228 by Jennifer Batista DPT       TBD, unsafe on eval d/t inconsistencies with WB               Goal: STG-Within one week, patient will ascend and descend 3 stairs with BHR min A, while maintaining TTWB       Dates: Start:  06/28/25         Goal Note filed on 06/30/25 1228 by Jennifer Batista DPT       TBD, unsafe to on eval                  Problem: PT-Long Term Goals       Dates: Start:  06/28/25         Goal: LTG-By discharge, patient will propel wheelchair mod I >150 ft over outdoor surfaces       Dates: Start:  06/28/25            Goal: LTG-By discharge, patient will ambulate >30ft indoors mod I with FWW, while maintaining TTWB L       Dates: Start:  06/28/25            Goal: LTG-By discharge, patient will transfer one surface to another mod I with FWW       Dates: Start:  06/28/25            Goal: LTG-By discharge, patient will ambulate up/down 1 6\" step with FWW + 3 stairs with 1 crutch/ 1 HR CGA to enter/ exit mobile home       Dates: Start:  06/28/25              "

## 2025-06-30 NOTE — CARE PLAN
The patient is Stable - Low risk of patient condition declining or worsening    Shift Goals  Clinical Goals: safety  Patient Goals: rest, pain relief  Family Goals: n/a    Progress made toward(s) clinical / shift goals:    Problem: Knowledge Deficit - Standard  Goal: Patient and family/care givers will demonstrate understanding of plan of care, disease process/condition, diagnostic tests and medications  Outcome: Progressing     Problem: Fall Risk - Rehab  Goal: Patient will remain free from falls  Outcome: Progressing     Problem: Pain - Standard  Goal: Alleviation of pain or a reduction in pain to the patient’s comfort goal  Outcome: Progressing

## 2025-06-30 NOTE — THERAPY
Physical Therapy   Daily Treatment     Patient Name:  Linda Carpenter  Age:  29 y.o., Sex:  female  Medical Record #:  [id number]  Today's Date: 6/30/2025     Precautions  Precautions: Fall Risk, Weight Bearing  Fall Risk: Poor balance, History of falls  Weight Bearing: TTWB LLE  Comments: PRAFO boot as tolerated in bed    Subjective: Pt was agreeable to POC, and to set up family training with sig other.    Objective:    06/30/25 1401   PT Charge Group   PT Therapeutic Exercise (Units) 1   PT Therapeutic Activities (Units) 1   PT Total Time Spent   PT Individual Total Time Spent (Mins) 30   Roll Left and Right   Level of Assist Modified Independent   Sit to Lying   Level of Assist Modified Independent   Lying to Sitting on Side of Bed   Level of Assist Modified Independent   Sit to Stand   Level of Assist Contact Guard Assist   Assistive Devices FWW   Chair/Bed-to-Chair Transfer   Level of Assist Contact Guard Assist;Modified Independent  (CGA SPT FWW, vs mod I wc level)   Transfer Type Stand Step Transfer   Assistive Devices FWW   Ambulation   Level of Assist Contact Guard Assist   Assistive Device FWW   Distance 25   Interdisciplinary Plan of Care Collaboration   IDT Collaboration with  Family / Caregiver;Nursing;Certified Nursing Assistant  (5yo son present throughout session)   Patient Position at End of Therapy Seated;Tray Table within Reach;Call Light within Reach;Phone within Reach;Family / Friend in Room   Collaboration Comments POC, pain med requested/ provided during session.         Therapeutic Activities  Purpose: to improve performance and function of daily activities, to provide patient and family education, and to increase safety with activities of daily life and mobility related to activities of daily life  Interventions:   Bed/chair transfer training  Bed mobility training (scooting, supine to sit, sit to supine, rolling)  Sit to stand training    Therapeutic Exercise  Purpose: to improve  "ROM/flexibility  Interventions:   Lower body exercises:   Supine program -   Straight leg raises, Front, 1 set of 10, and Other Resistance minimal ROM, using leg , LLE  Short arc quad, 1 set of 10, Right, and Other Resistance leg  AAROM  Heel slide, 1 set of 10, Left, and Other Resistance AAROM leg   Gluteal isometrics, 1 set of 10 and Bilateral  Quadriceps isometrics, 1 set of 10, Bilateral, and Other Resistance leg     Gait Training  Purpose: to improve functional ambulation, to address gait deviations, and to improve walking endurance  Interventions:   Safe use of device  Walking endurance  Deviations (laterality in comments): TTWB L- compliant  Antalgic  Bradykinetic  Decreased [place]  Decreased heel strike    Assessment: Pt demo'ed improved stability with household gait, despite continuing to prefer NWB over TTWB. Pt remains highly motivated toward D/C, and family training was scheduled during the session with sig other for Wednesday at 12:30, for safety on stairs.     Strengths: Effective communication skills, Independent prior level of function, Motivated for self care and independence, Pleasant and cooperative, Willingly participates in therapeutic activities  Barriers: Decreased endurance, Fatigue, Home accessibility, Impaired activity tolerance, Impaired balance, Limited mobility, Pain, Pain poorly managed (TTWB L)    Plan:   Family training with sig other Wed 12:30-1pm with emphasis on stairs.   Ongoing EVERT training with BHR ->progressing to 1HR + 1 crutch.   Ongoing household amb practice for safety with TTWB.    DME    PT DME Recommendations  Wheelchair: 16\" Width, Anti-tippers (d/t TTWB LLE (not energy efficient))  Cushion: Standard  Additional Equipment:  (Pt has crutches (from the hospital), and access to her mother's FWW and commode per pt report)      Passport items to be completed:  Get in/out of bed safely, in/out of a vehicle, safely use mobility device, walk or wheel around " "home/community, navigate up and down stairs, show how to get up/down from the ground, ensure home is accessible, demonstrate HEP, complete caregiver training    Physical Therapy Problems (Active)       Problem: Mobility       Dates: Start:  06/28/25         Goal: STG-Within one week, patient will propel wheelchair community >150 ft mod I over indoor and outdoor surfaces        Dates: Start:  06/28/25         Goal Note filed on 06/30/25 1228 by Jennifer Batista DPT       Indoor- met, outdoors TBD              Goal: STG-Within one week, patient will ambulate household distance >25 ft x 2 with CGA consistently, while maintaining TTWB L with FWW       Dates: Start:  06/28/25         Goal Note filed on 06/30/25 1228 by Jennifer Batista DPT       Pt prefers NWB over TTWB demo'ing dec stability and safety              Goal: STG-Within one week, patient will ambulate up/down a 4\" curb with FWW min A        Dates: Start:  06/28/25         Goal Note filed on 06/30/25 1228 by Jennifer Batista DPT       TBD, unsafe on eval d/t inconsistencies with WB               Goal: STG-Within one week, patient will ascend and descend 3 stairs with BHR min A, while maintaining TTWB       Dates: Start:  06/28/25         Goal Note filed on 06/30/25 1228 by Jennifer Batista DPT       TBD, unsafe to on eval                  Problem: PT-Long Term Goals       Dates: Start:  06/28/25         Goal: LTG-By discharge, patient will propel wheelchair mod I >150 ft over outdoor surfaces       Dates: Start:  06/28/25            Goal: LTG-By discharge, patient will ambulate >30ft indoors mod I with FWW, while maintaining TTWB L       Dates: Start:  06/28/25            Goal: LTG-By discharge, patient will transfer one surface to another mod I with FWW       Dates: Start:  06/28/25            Goal: LTG-By discharge, patient will ambulate up/down 1 6\" step with FWW + 3 stairs with 1 crutch/ 1 HR CGA to enter/ exit mobile home       Dates: Start:  06/28/25          "

## 2025-06-30 NOTE — WOUND TEAM
[facility]  Inpatient Services  Wound and Skin Care Brief Evaluation    Admission Date: 6/27/2025     Last order of IP CONSULT TO WOUND CARE was found on 6/27/2025 from Hospital Encounter on 6/27/2025     HPI, PMH, SH: Reviewed    No chief complaint on file.    Diagnosis: History of lower leg fracture [Z87.81]    Unit where seen by Wound Team: [facility]17/02     Wound consult placed regarding Left leg and buttocks. Chart and images reviewed. This clinician in to assess patient. Patient states the wound on her bottom is almost healed. She says its from sweating. Recommend normal hygiene measures, and keeping skin clean and dry. I offered to look at it and assess, and she says its fine.   Photo from yesterday    Her Left leg has a ACE bandage and it is CDI. Photos reviewed.   Per last ortho note, change dressing every other day.    Recommend wearing the PRAFO to prevent foot drop. She currently doesn't have it on, it is at the bedside. Pt states she will put it back on. Discussed with the patient.       No pressure injuries or advanced wound care needs identified. Wound consult completed. No further follow up unless indicated and consulted.          PREVENTATIVE INTERVENTIONS:    Q shift Carmelo - performed per nursing policy  Q shift pressure point assessments - performed per nursing policy    Surface/Positioning  Standard/trauma mattress - Currently in Place    Offloading/Redistribution  Float Heels off Bed with Pillows - Currently in Place           Containment/Moisture Prevention    Dri-zia pad - Currently in Place  Brief with mobilization - Currently in Place    Mobilization      Working with therapies

## 2025-06-30 NOTE — DISCHARGE PLANNING
Case Management / Initial authorization:    [place] Medicaid Auth #[id number]   Days authorized: 14 from 6/27 to 7/10     Clinical concurrent review due on 7/10 via [place] Medicaid website.

## 2025-06-30 NOTE — CARE PLAN
"  Problem: Mobility  Goal: STG-Within one week, patient will ambulate household distance >25 ft x 2 with CGA consistently, while maintaining TTWB L with FWW  Outcome: Not Met  Note: Pt prefers NWB over TTWB demo'ing dec stability and safety  Goal: STG-Within one week, patient will ambulate up/down a 4\" curb with FWW min A   Outcome: Not Met  Note: TBD, unsafe on eval d/t inconsistencies with WB   Goal: STG-Within one week, patient will ascend and descend 3 stairs with BHR min A, while maintaining TTWB  Outcome: Not Met  Note: TBD, unsafe to on eval      Problem: Mobility  Goal: STG-Within one week, patient will propel wheelchair community >150 ft mod I over indoor and outdoor surfaces   Outcome: Progressing  Note: Indoor- met, outdoors TBD     Problem: Mobility Transfers  Goal: STG-Within one week, patient will transfer bed to chair mod I reach pivot, while maintaining TTWB  Outcome: Met     "

## 2025-06-30 NOTE — PROGRESS NOTES
"  Physical Medicine & Rehabilitation Progress Note    Encounter Date: 6/30/2025    Chief Complaint: LE fracture    Interval Events (Subjective):  VS: VSS  Last BM: 6/29  Bladder: Voiding   Schedule Meds Not Given: None   PRN Meds Taken: Oxy 15    Patient seen and examined in her room with son and . She is doing well, just really wants to go home. Pain somewhat more controlled with Oxy 15mg.       ROS: 14 point ROS negative unless otherwise specified in the HPI    Objective:  VITAL SIGNS: /80   Pulse 95   Temp 36.2 °C (97.2 °F) (Oral)   Resp 18   Ht 1.636 m (5' 4.41\")   Wt 86.7 kg (191 lb 2.2 oz)   LMP  (LMP Unknown)   SpO2 95%   BMI 32.39 kg/m²     GEN: No apparent distress  HEENT: Head normocephalic, atraumatic.  Sclera nonicteric bilaterally, no ocular discharge appreciated bilaterally.  CV: Extremities warm and well-perfused, no peripheral edema appreciated bilaterally.  PULMONARY: Breathing nonlabored on room air, no respiratory accessory muscle use.  Not requiring supplemental oxygen.  SKIN: No appreciable skin breakdown on exposed areas of skin.  PSYCH: Mood and affect within normal limits.  NEURO: Awake alert.  Conversational.  Logical thought content.      Laboratory Values:  Recent Results (from the past 72 hours)   CBC with Differential    Collection Time: 06/28/25  5:31 AM   Result Value Ref Range    WBC 7.5 4.8 - 10.8 K/uL    RBC 2.69 (L) 4.20 - 5.40 M/uL    Hemoglobin 9.7 (L) 12.0 - 16.0 g/dL    Hematocrit 29.9 (L) 37.0 - 47.0 %    .2 (H) 81.4 - 97.8 fL    MCH 36.1 (H) 27.0 - 33.0 pg    MCHC 32.4 32.2 - 35.5 g/dL    RDW 78.9 (H) 35.9 - 50.0 fL    Platelet Count 257 164 - 446 K/uL    MPV 10.3 9.0 - 12.9 fL    Neutrophils-Polys 47.20 44.00 - 72.00 %    Lymphocytes 41.00 22.00 - 41.00 %    Monocytes 7.50 0.00 - 13.40 %    Eosinophils 3.20 0.00 - 6.90 %    Basophils 0.80 0.00 - 1.80 %    Immature Granulocytes 0.30 0.00 - 0.90 %    Nucleated RBC 0.00 0.00 - 0.20 /100 WBC    " Neutrophils (Absolute) 3.54 1.82 - 7.42 K/uL    Lymphs (Absolute) 3.07 1.00 - 4.80 K/uL    Monos (Absolute) 0.56 0.00 - 0.85 K/uL    Eos (Absolute) 0.24 0.00 - 0.51 K/uL    Baso (Absolute) 0.06 0.00 - 0.12 K/uL    Immature Granulocytes (abs) 0.02 0.00 - 0.11 K/uL    NRBC (Absolute) 0.00 K/uL   Comp Metabolic Panel (CMP)    Collection Time: 06/28/25  5:31 AM   Result Value Ref Range    Sodium 138 135 - 145 mmol/L    Potassium 3.5 (L) 3.6 - 5.5 mmol/L    Chloride 105 96 - 112 mmol/L    Co2 22 20 - 33 mmol/L    Anion Gap 11.0 7.0 - 16.0    Glucose 80 65 - 99 mg/dL    Bun 6 (L) 8 - 22 mg/dL    Creatinine 0.64 0.50 - 1.40 mg/dL    Calcium 8.6 8.5 - 10.5 mg/dL    Correct Calcium 9.5 8.5 - 10.5 mg/dL    AST(SGOT) 22 12 - 45 U/L    ALT(SGPT) 16 2 - 50 U/L    Alkaline Phosphatase 83 30 - 99 U/L    Total Bilirubin 0.3 0.1 - 1.5 mg/dL    Albumin 2.9 (L) 3.2 - 4.9 g/dL    Total Protein 5.8 (L) 6.0 - 8.2 g/dL    Globulin 2.9 1.9 - 3.5 g/dL    A-G Ratio 1.0 g/dL   Magnesium    Collection Time: 06/28/25  5:31 AM   Result Value Ref Range    Magnesium 1.6 1.5 - 2.5 mg/dL   Phosphorus    Collection Time: 06/28/25  5:31 AM   Result Value Ref Range    Phosphorus 3.8 2.5 - 4.5 mg/dL   TSH with Reflex to FT4    Collection Time: 06/28/25  5:31 AM   Result Value Ref Range    TSH 1.590 0.380 - 5.330 uIU/mL   Vitamin D, 25-hydroxy (blood)    Collection Time: 06/28/25  5:31 AM   Result Value Ref Range    25-Hydroxy   Vitamin D 25 15 (L) 30 - 100 ng/mL   ESTIMATED GFR    Collection Time: 06/28/25  5:31 AM   Result Value Ref Range    GFR (CKD-EPI) 121 >60 mL/min/1.73 m 2       Medications:  Scheduled Medications[1]  PRN medications: metaxalone, oxyCODONE immediate-release **OR** oxyCODONE immediate-release, lidocaine, hydrALAZINE, senna-docusate **AND** polyethylene glycol/lytes, lactulose, docusate sodium, bisacodyl EC, magnesium hydroxide, sodium phosphate, carboxymethylcellulose, benzocaine-menthol, mag hydrox-al hydrox-simeth, ondansetron  **OR** ondansetron, traZODone, sodium chloride, [COMPLETED] acetaminophen **FOLLOWED BY** acetaminophen, ibuprofen    Diet:  Current Diet Order   Procedures    Diet Order Diet: Regular       Medical Decision Making and Plan:  L traumatic distal tibial fracture s/p ORIF with IMN Dr. Mcdaniel 25  PT and OT for mobility and ADLs. Per guidelines, 15 hours per week between PT, OT and/or SLP.  Follow-up Ortho  TTWB LLE     Pain - Scheduled Tylenol, Shan. S/p IV Ketorolac. PRN ibuprofen.   increase PRN Oxycodone to 10-15 mg every 3 hours as needed.  Discussed that this would only be increased for a few days, then would reduce oxycodone to a lower dose.      Pain more controlled with PRN Oxy 15mg. Monitor and decrease as able.      EtOH Use + Chronic Tremors - Thiamine, folate, MVN     Mood Disorder - Continue Lexapro     Hypokalemia  -Potassium borderline low 3.5 on 2025.  Will replete with 20 milliequivalents potassium  Repeat      ABLA + Macrocytic Anemia - hemoglobin 9.7 on 2025, stable     Vitamin D Deficiency -15 on 2025.  Continue vitamin D supplementation 5000 units daily     GERD - PPI     Bowel - Patient on Senna-docusate for constipation prophylaxis.      Bladder - TV/PVR/BS PRN        Upcoming Labs/imagin/1      DVT PROPHYLAXIS: Lovenox --> aspirin x 4 weeks in OP setting      HOSPITALIST FOLLOWING: No     CODE STATUS: FULL      DISPO: Home with spouse      JIGAR: 7/3/25     MEDS SENT TO: TBD     DISCHARGE SPECIALIST FOLLOW UP: Ortho (Jonas)     Patient to scheduled follow up with their PCP within 2 weeks from discharge from the [facility].    ____________________________________    Dr. Alondra Rausch DO, MS  [facility] - Physical Medicine & Rehabilitation   ____________________________________    _____________________________________  Interdisciplinary Team Conference   Most recent IDT on 2025    I, Dr. Alondra Rausch DO, MS, was present and led the  interdisciplinary team conference on 6/30/2025.  I led the IDT conference and agree with the IDT conference documentation and plan of care as noted below.       Nursing:  Diet Current Diet Order   Procedures    Diet Order Diet: Regular       Eating ADL Independent     % of Last Meal      Sleep    Bowel Last BM: 06/29/25   Bladder        Physical Therapy:  Bed Mobility Roll Left and Right: Modified Independent  Sit to Lying: Modified Independent  Lying to Sitting: Modified Independent   Transfers Sit to Stand: Modified Independent  Chair/Bed to Chair: Modified Independent  Toilet: Modified Independent  Car: Contact Guard Assist   Mobility Ambulation: Minimal Assist  Device: FWW  Ambulation Distance: 15  Wheelchair: Supervised, Set up  Type: Manual  Wheelchair Distance: 150   Stairs/Curbs Stairs: Unable to Participate  Stairs Amount:    Curbs: Unable to Participate         Occupational Therapy:  Oral Hygiene Independent   Grooming Independent   Shower/Bathing Contact Guard Assist   UB Dressing Independent   LB Dressing Minimal Assist  Minimal Assist   Toileting Transfer: Modified Independent  Hygiene: Modified Independent   Shower & Transfer Contact Guard Assist (dry tub shower transfers via TTB and SC in tub)         Respiratory Therapy:  O2 (LPM): 0  O2 Delivery Device: None - Room Air    Case Management:  Continues to work on disposition and DME needs.        Discharge Date/Disposition:  7/3/25  _____________________________________    Total time:  52 minutes. Time spent included pre-rounding review of vitals and tests, unit/floor time, face-to-face time with the patient including physical examination, care coordination, counseling of patient and/or family, ordering medications/procedures/tests, discussion with CM, PT, OT, SLP and/or other healthcare providers, and documentation in the electronic medical record.          [1]   Scheduled Medications   Medication Dose Frequency    senna-docusate  2 Tablet Q EVENING     enoxaparin (LOVENOX) injection  40 mg DAILY AT 1800    escitalopram  10 mg Q EVENING    gabapentin  100 mg TID    omeprazole  40 mg DAILY    polyethylene glycol/lytes  1 Packet DAILY    vitamin D3  5,000 Units DAILY

## 2025-06-30 NOTE — PROGRESS NOTES
NURSING DAILY NOTE    Name: Linda Carpenter   Date of Admission: 6/27/2025   Admitting Diagnosis: Fracture of left tibia  Attending Physician: NATIVIDAD MELENDEZ D.O.  Allergies: Cefaclor and Sulfamethoxazole w-trimethoprim    Safety  Patient Assist     Patient Precautions  Precautions: Fall Risk, Weight Bearing  Fall Risk: Poor balance, History of falls  Weight Bearing: TTWB LLE  Comments: PRAFO boot as tolerated in bed  Nursing Mobility:  Bed Transfer Status:    Toilet Transfer Status: Stand By Assist  Therapy Mobility:  Bed Transfer Status: Stand By Assist, Stand Pivot Transfer, Wheelchair  Toilet Transfer Status: Stand By Assist, Stand Pivot Transfer, Wheelchair, Grab Bars  Assistive Devices  Wheelchair, Rails  Oxygen  None - Room Air  Diet/Therapeutic Dining  Current Diet Order   Procedures    Diet Order Diet: Regular     Pill Administration  whole  Agitated Behavioral Scale     ABS Level of Severity       Fall Risk  Has the patient had a fall this admission?      Rosie Holguin Fall Risk Scoring  14, MODERATE RISK  Fall Risk Safety Measures  bed alarm, chair alarm, poor balance, and low vision/hearing    Vitals  Temperature: 36.1 °C (96.9 °F)  Temp src: Temporal  Pulse: 91  Respiration: 18  Blood Pressure: 104/73  Blood Pressure MAP (Calculated): 83 MM HG  BP Location: Right, Upper Arm  Patient BP Position: Minor's Position     Oxygen  Pulse Oximetry: 94 %  O2 (LPM): 0  O2 Delivery Device: None - Room Air    Bowel and Bladder  Last Bowel Movement  06/29/25  Stool Type  Type 5: Soft blob with clear cut edges (passed easily)  Bowel Device  Toilet  Continent  Bladder: Continent void   Bowel: No movement  Bladder Function  Number of Times Voided: 1  Time Void: Yes  Bladder Device: Toilet  Urine Color: Yellow  Urine Clarity: Clear  Genitourinary Assessment   Bladder Assessment (WDL):  Within Defined Limits  Erickson Catheter: Not Applicable  Urine Color: Yellow  Urine Clarity: Clear  Bladder Device: Toilet  Time  Void: Yes  Bladder Scan: Post Void  $ Bladder Scan Results (mL): 21    Skin  Carmelo Score   17  Sensory Interventions   Bed Types: Standard/Trauma Mattress  Skin Preventative Measures: Pillows in Use to Float Heels, Pillows in Use for Support / Positioning  Moisture Interventions         Pain  Pain Rating Scale  10 - As bad as it could be, nothing else matters  Pain Location  Leg  Pain Location Orientation  Left  Pain Interventions   Medication (see MAR)    ADLs    Bathing   Shower  Linen Change      Grooming     Oral Care  Brushed Teeth  Teeth/Dentures     Nutrition Intake  Oral Nutrition Intake  P.O.: 240 mL  Meal Type: Dinner  Percentage Consumed (%): 100 %     Environmental Precautions     Patient Turns/Positioning  Patient turns self independently side to side without assistance, to offload sacral area, Supine  Patient Turns Assistance/Tolerance  Assistance of One      Psychosocial/Neurologic Assessment  Psychosocial Assessment  Psychosocial (WDL):  Within Defined Limits  Neurologic Assessment  Neuro (WDL): Within Defined Limits  Level of Consciousness: Alert  EENT (WDL):  Within Defined Limits    Cardio/Pulmonary Assessment  Edema   LLE Edema: Generalized  Respiratory Breath Sounds  RUL Breath Sounds: Clear  RML Breath Sounds: Clear  RLL Breath Sounds: Clear  MARIA E Breath Sounds: Clear  LLL Breath Sounds: Clear  Cardiac Assessment   Cardiac (WDL):  Within Defined Limits

## 2025-07-01 ENCOUNTER — APPOINTMENT (OUTPATIENT)
Dept: OCCUPATIONAL THERAPY | Facility: REHABILITATION | Age: 30
DRG: 560 | End: 2025-07-01
Attending: PHYSICAL MEDICINE & REHABILITATION
Payer: MEDICAID

## 2025-07-01 ENCOUNTER — APPOINTMENT (OUTPATIENT)
Dept: PHYSICAL THERAPY | Facility: REHABILITATION | Age: 30
DRG: 560 | End: 2025-07-01
Attending: PHYSICAL MEDICINE & REHABILITATION
Payer: MEDICAID

## 2025-07-01 PROCEDURE — 700102 HCHG RX REV CODE 250 W/ 637 OVERRIDE(OP): Performed by: STUDENT IN AN ORGANIZED HEALTH CARE EDUCATION/TRAINING PROGRAM

## 2025-07-01 PROCEDURE — A9270 NON-COVERED ITEM OR SERVICE: HCPCS | Performed by: STUDENT IN AN ORGANIZED HEALTH CARE EDUCATION/TRAINING PROGRAM

## 2025-07-01 PROCEDURE — 770010 HCHG ROOM/CARE - REHAB SEMI PRIVAT*

## 2025-07-01 PROCEDURE — 700111 HCHG RX REV CODE 636 W/ 250 OVERRIDE (IP): Mod: JZ | Performed by: PHYSICAL MEDICINE & REHABILITATION

## 2025-07-01 PROCEDURE — A9270 NON-COVERED ITEM OR SERVICE: HCPCS | Performed by: PHYSICAL MEDICINE & REHABILITATION

## 2025-07-01 PROCEDURE — 700102 HCHG RX REV CODE 250 W/ 637 OVERRIDE(OP): Performed by: PHYSICAL MEDICINE & REHABILITATION

## 2025-07-01 PROCEDURE — 99232 SBSQ HOSP IP/OBS MODERATE 35: CPT | Performed by: PHYSICAL MEDICINE & REHABILITATION

## 2025-07-01 PROCEDURE — 97530 THERAPEUTIC ACTIVITIES: CPT

## 2025-07-01 PROCEDURE — 97110 THERAPEUTIC EXERCISES: CPT

## 2025-07-01 PROCEDURE — 97112 NEUROMUSCULAR REEDUCATION: CPT

## 2025-07-01 RX ADMIN — Medication 5000 UNITS: at 08:13

## 2025-07-01 RX ADMIN — OXYCODONE HYDROCHLORIDE 15 MG: 10 TABLET ORAL at 08:13

## 2025-07-01 RX ADMIN — OXYCODONE HYDROCHLORIDE 15 MG: 10 TABLET ORAL at 14:55

## 2025-07-01 RX ADMIN — OXYCODONE HYDROCHLORIDE 15 MG: 10 TABLET ORAL at 21:16

## 2025-07-01 RX ADMIN — OXYCODONE HYDROCHLORIDE 15 MG: 10 TABLET ORAL at 17:54

## 2025-07-01 RX ADMIN — OXYCODONE HYDROCHLORIDE 15 MG: 10 TABLET ORAL at 11:23

## 2025-07-01 RX ADMIN — OXYCODONE HYDROCHLORIDE 15 MG: 10 TABLET ORAL at 05:12

## 2025-07-01 RX ADMIN — ENOXAPARIN SODIUM 40 MG: 100 INJECTION SUBCUTANEOUS at 17:54

## 2025-07-01 RX ADMIN — OMEPRAZOLE 40 MG: 20 CAPSULE, DELAYED RELEASE ORAL at 08:13

## 2025-07-01 RX ADMIN — DOCUSATE SODIUM 50MG AND SENNOSIDES 8.6MG 2 TABLET: 8.6; 5 TABLET, FILM COATED ORAL at 19:51

## 2025-07-01 RX ADMIN — GABAPENTIN 100 MG: 100 CAPSULE ORAL at 14:55

## 2025-07-01 RX ADMIN — ESCITALOPRAM OXALATE 10 MG: 10 TABLET ORAL at 19:51

## 2025-07-01 RX ADMIN — POLYETHYLENE GLYCOL 3350 1 PACKET: 17 POWDER, FOR SOLUTION ORAL at 08:14

## 2025-07-01 RX ADMIN — GABAPENTIN 100 MG: 100 CAPSULE ORAL at 19:51

## 2025-07-01 RX ADMIN — GABAPENTIN 100 MG: 100 CAPSULE ORAL at 08:13

## 2025-07-01 ASSESSMENT — PAIN DESCRIPTION - PAIN TYPE
TYPE: ACUTE PAIN

## 2025-07-01 NOTE — THERAPY
"Occupational Therapy  Daily Treatment     Patient Name:  Linda Carpenter  Age:  29 y.o., Sex:  female  Medical Record #:  [id number]  Today's Date:  7/1/2025    Precautions  Precautions: Fall Risk, Weight Bearing  Fall Risk: Poor balance  Weight Bearing: TTWB LLE  Comments: PRAFO boot as tolerated in bed    Subjective: \"I can't wait to get home.\"      Objective:    07/01/25 1101   OT Charge Group   OT Therapy Activity (Units) 2   OT Total Time Spent   OT Individual Total Time Spent (Mins) 30   Interdisciplinary Plan of Care Collaboration   Patient Position at End of Therapy In Bed;Call Light within Reach;Tray Table within Reach;Phone within Reach;Family / Friend in Room  (pts 3 yo son in room during session)         Went over pt's d/c plans and discussed any questions/concerns pt has about d/c. Pt reported feeling confident about using the w/c and walker in the home. Pt will have support from family who will be assisting with the children but could help if needed with cooking/cleaning. Pt did report that mother no longer has a SC available due to another friend borrowing it- discussed places pt can go to get a SC/TTB I.e. thrift shops, [employer], or on [employer] if needed     Increased time taken to go over HEP for BUE strengthening using blue thera-band. Handout issued to pt on exercises.      Assessment:    Pt tolerated session well. Pt reported pain in LLE- nurse notified. Increased time taken to go over d/c plans and concerns for d/c. Educated pt on HEP for BUE using blue thera-band. Pt is to d/c home in 2 days     Strengths: Able to follow instructions, Adequate strength, Alert and oriented, Independent prior level of function, Making steady progress towards goals, Motivated for self care and independence, Pleasant and cooperative, Supportive family, Willingly participates in therapeutic activities  Barriers: Impaired balance, Pain poorly managed    Plan:    D/C IRF [facility]     Occupational Therapy Goals " (Active)       Problem: Dressing       Dates: Start:  06/28/25         Goal: STG-Within one week, patient will dress LB with SBA       Dates: Start:  06/28/25         Goal Note filed on 06/30/25 0822 by Heidi Fong MS,OTR/L       Not yet reassessed                 Problem: IADL's       Dates: Start:  06/30/25         Goal: STG-Within one week, patient will access kitchen area with Min A using DME/AE as needed       Dates: Start:  06/30/25               Problem: OT Long Term Goals       Dates: Start:  06/28/25         Goal: LTG-By discharge, patient will complete basic self care tasks with mod I        Dates: Start:  06/28/25            Goal: LTG-By discharge, patient will perform bathroom transfers with mod I       Dates: Start:  06/28/25            Goal: LTG-By discharge, patient will complete basic home management with mod I       Dates: Start:  06/28/25

## 2025-07-01 NOTE — THERAPY
Occupational Therapy  Daily Treatment     Patient Name:  Linda Carpenter  Age:  29 y.o., Sex:  female  Medical Record #:  [id number]  Today's Date:  7/1/2025    Precautions  Precautions: Fall Risk, Weight Bearing  Fall Risk: Poor balance  Weight Bearing: TTWB LLE  Comments: PRAFO boot as tolerated in bed    Subjective: Pt seated in bed upon arrival, pleasant and cooperative, agreeable to therapy. Son present throughout session     Objective:    07/01/25 1001   OT Charge Group   OT Therapy Activity (Units) 3   OT Therapeutic Exercise (Units) 1   OT Total Time Spent   OT Individual Total Time Spent (Mins) 60   Interdisciplinary Plan of Care Collaboration   Patient Position at End of Therapy Seated;Call Light within Reach;Tray Table within Reach;Self Releasing Lap Belt Applied;Family / Friend in Room     Therapeutic Activities  Purpose: to improve performance and function of daily activities, to provide patient and family education, and to increase safety with activities of daily life and mobility related to activities of daily life  Interventions:  Functional mobility outdoors at w/c level: SBA on uneven ground (pavers) sidewalk, ramp, over threshold, stand pivot transfer to bench    Therapeutic Exercise  Purpose: to improve strength, to improve ROM/flexibility, and to improve functional endurance  Interventions:   Seated BUE therex using 4lb dumbbell: shoulder press, chest press, shoulder IR/ER, bicep curls 2 sets x 15     Instrumental Activities of Daily Living (IADL's)  Purpose: to improve function, safety, and independence with instrumental activities of daily living  Interventions:   Kitchen mobility - pt completed both at FWW (SBA) and w/c level (Mod I). pt able to retrieve items throughout in high/low shelving and in appliances with no LOB. Edu provided re: kitchen safety, work simplification, and energy conservation strategies with use of DME/AE     Assessment:    Pt completed a variety of mobility tasks  at both w/c and FWW level with SBA  overall. Pt occasionally requires cues to remember to slow down with movement    Strengths: Able to follow instructions, Adequate strength, Alert and oriented, Independent prior level of function, Making steady progress towards goals, Motivated for self care and independence, Pleasant and cooperative, Supportive family, Willingly participates in therapeutic activities  Barriers: Impaired balance, Pain poorly managed    Plan:  Standing tolerance, strength/endurance     Lives with fiance,1 story, 4 EVERT (steep), tub, 4/8 yo boys, stay at home mom. Will have assist with childcare at d/c. mom (recent hip sx) is letting her borrow her FWW, BSC to go over the toilet and SC- pt unsure if it is a SC or TTB    Occupational Therapy Goals (Active)       Problem: Dressing       Dates: Start:  06/28/25         Goal: STG-Within one week, patient will dress LB with SBA       Dates: Start:  06/28/25         Goal Note filed on 06/30/25 0822 by Heidi Fong, MS,OTR/L       Not yet reassessed                 Problem: IADL's       Dates: Start:  06/30/25         Goal: STG-Within one week, patient will access kitchen area with Min A using DME/AE as needed       Dates: Start:  06/30/25               Problem: OT Long Term Goals       Dates: Start:  06/28/25         Goal: LTG-By discharge, patient will complete basic self care tasks with mod I        Dates: Start:  06/28/25            Goal: LTG-By discharge, patient will perform bathroom transfers with mod I       Dates: Start:  06/28/25            Goal: LTG-By discharge, patient will complete basic home management with mod I       Dates: Start:  06/28/25

## 2025-07-01 NOTE — PROGRESS NOTES
NURSING DAILY NOTE    Name: Linda Carpenter   Date of Admission: 6/27/2025   Admitting Diagnosis: Fracture of left tibia  Attending Physician: Alondra Rausch D.o.  Allergies: Cefaclor and Sulfamethoxazole w-trimethoprim    Safety  Patient Assist     Patient Precautions     Precaution Comments     Bed Transfer Status     Toilet Transfer Status      Assistive Devices  Rails, Wheelchair  Oxygen  None - Room Air  Diet/Therapeutic Dining  Current Diet Order   Procedures    Diet Order Diet: Regular     Pill Administration  whole  Agitated Behavioral Scale     ABS Level of Severity       Fall Risk  Has the patient had a fall this admission?   No  Rosie Holguin Fall Risk Scoring  14, MODERATE RISK  Fall Risk Safety Measures  bed alarm, chair alarm, poor balance, and low vision/ hearing    Vitals  Temperature: 36.2 °C (97.2 °F)  Temp src: Oral  Pulse: 95  Respiration: 18  Blood Pressure: 104/70  Blood Pressure MAP (Calculated): 81 MM HG  BP Location: Right, Upper Arm  Patient BP Position: Supine     Oxygen  Pulse Oximetry: 97 %  O2 (LPM): 0  O2 Delivery Device: None - Room Air    Bowel and Bladder  Last Bowel Movement  06/29/25  Stool Type  Type 5: Soft blob with clear cut edges (passed easily)  Bowel Device  Toilet  Continent  Bladder: Continent void   Bowel: No movement  Bladder Function  Number of Times Voided: 1  Urine Color: Unable To Evaluate  Urine Clarity: Clear  Genitourinary Assessment   Bladder Assessment (WDL):  Within Defined Limits  Erickson Catheter: Not Applicable  Urine Color: Unable To Evaluate  Urine Clarity: Clear  Bladder Device: Toilet  Time Void: Yes  Bladder Scan: Post Void  $ Bladder Scan Results (mL): 21    Skin  Carmelo Score   17  Sensory Interventions   Bed Types: Standard/Trauma Mattress  Skin Preventative Measures: Pillows in Use for Support / Positioning  Moisture Interventions         Pain  Pain Rating Scale  9 - Can't bear the  "pain, unable to do anything  Pain Location  Leg  Pain Location Orientation  Left  Pain Interventions   Medication (see MAR)    ADLs    Bathing   Shower  Linen Change      Personal Hygiene     Chlorhexidine Bath      Oral Care  Brushed Teeth  Teeth/Dentures     Shave     Nutrition Percentage Eaten     Environmental Precautions     Patient Turns/Positioning  Patient turns self independently side to side without assistance, to offload sacral area, Supine  Patient Turns Assistance/Tolerance  Assistance of One  Bed Positions     Head of Bed Elevated         Psychosocial/Neurologic Assessment  Psychosocial Assessment  Psychosocial (WDL):  Within Defined Limits  Neurologic Assessment  Neuro (WDL): Within Defined Limits  Level of Consciousness: Alert  Orientation Level: Oriented X4  Cognition: Appropriate judgement, Appropriate safety awareness, Appropriate attention/concentration, Follows commands  Speech: Clear  Pupil & Vision Field Assessment: No  Motor Function, Sensation & Ataxia Assessment: Sensation  LLE Sensation: Tingling (\"pins and needles\")  EENT (WDL):  Within Defined Limits    Cardio/Pulmonary Assessment  Edema   LLE Edema: Generalized  Respiratory Breath Sounds  RUL Breath Sounds: Clear  RML Breath Sounds: Clear  RLL Breath Sounds: Clear  MARIA E Breath Sounds: Clear  LLL Breath Sounds: Clear  Cardiac Assessment   Cardiac (WDL):  Within Defined Limits         "

## 2025-07-01 NOTE — THERAPY
"Physical Therapy   Daily Treatment     Patient Name:  Linda Carpenter  Age:  29 y.o., Sex:  female  Medical Record #:  [id number]  Today's Date: 7/1/2025     Precautions  Precautions: Fall Risk, Weight Bearing  Fall Risk: Poor balance  Weight Bearing: TTWB LLE  Comments: PRAFO boot as tolerated in bed    Subjective: Pt was willing to progress stair mobility in preparation for family training tomorrow with sig other.     Objective:    07/01/25 1401   PT Charge Group   PT Neuromuscular Re-Education / Balance (Units) 1   PT Therapeutic Activities (Units) 3   PT Total Time Spent   PT Individual Total Time Spent (Mins) 60   Pain 0 - 10 Group   Location Leg   Location Orientation Left   Cognition    Comments Difficulty recallling sequencing steps for stair mobility, with TTWB and SPC   Roll Left and Right   Level of Assist Modified Independent   Sit to Lying   Level of Assist Modified Independent   Lying to Sitting on Side of Bed   Level of Assist Modified Independent   Sit to Stand   Level of Assist Stand By Assist   Assistive Devices Parallel Bars;Other  (1 crutch)   Chair/Bed-to-Chair Transfer   Level of Assist Modified Independent   Transfer Type Stand Pivot Transfer   Assistive Devices Wheelchair   Ambulation   Level of Assist Contact Guard Assist   Assistive Device Parallel Bars;Other  (1 axillary crutch/ 1 UE on // bars to simulate EVERT)   Curbs   Level of Assist Minimal Assist   Assistive Device L handrail  (R axillary crutch)   Curb Height 4\" step;6\" step   Additional Description Completed in parallel bars;Cueing needed;Extra time needed  (4\"x4, // bars -> 1// bar, 1 crutch, 6\" 4x // bars-> 1 crutch/ 1 bar)   Wheelchair   Level of Assist Modified Independent   Type Manual   Wheelchair Distance 250x2   Interdisciplinary Plan of Care Collaboration   IDT Collaboration with  Nursing;Family / Caregiver  (2yr old child)   Patient Position at End of Therapy In Bed   Collaboration Comments Pain med requested/ provided " "        Therapeutic Activities  Purpose: to improve performance and function of daily activities and to increase safety with activities of daily life and mobility related to activities of daily life  Interventions:   Bed/chair transfer training  Bed mobility training (scooting, supine to sit, sit to supine, rolling)  Sit to stand training    Stair Training  Purpose: to improve functional use of stairs and to improve stair climbing endurance  Interventions:   Step up/downs  Curb training  Safe use of device  Introduction of 1 crutch + 1 rail in // bars 4\" -> 6\"    Neuro Re-Education  Purpose: to improve balance, to improve timing, coordination, and recruitment of muscles, and to improve motor control   Interventions:   Static/Dynamic standing balance training  Coordination training  Motor control/learning training  Facilitation/Cueing: Tactile cues and Verbal cues    Assessment: Pt progressed to 1 crutch + 1 rail (in // bars to focus on sequencing and safety), and from 4\" to 6\" steps. Pt demo'ed difficulty with recall of sequencing with TTWB and crutch requiring mass practice and intermittent rest breaks. Pt required min A 1-2x for balance, but was otherwise CGA.     Strengths: Effective communication skills, Independent prior level of function, Motivated for self care and independence, Pleasant and cooperative, Willingly participates in therapeutic activities  Barriers: Decreased endurance, Fatigue, Home accessibility, Impaired activity tolerance, Impaired balance, Limited mobility, Pain, Pain poorly managed (TTWB L)    Plan:   Family training with sig other Wed 12:30-1pm with emphasis on stairs.   Ongoing EVERT training with BHR ->progressing to 1HR + 1 crutch.   Ongoing household amb practice for safety with TTWB.    DME    PT DME Recommendations  Wheelchair: 16\" Width, David Height (16\"-17\"), Lightweight, Elevating Leg Rests (left elevating leg rest)  Cushion: Standard  Assistive Device: Front Wheeled " "Walker  Additional Equipment:  (Pt has crutches (from the hospital), and access to her mother's FWW and commode per pt report)      Passport items to be completed:  Get in/out of bed safely, in/out of a vehicle, safely use mobility device, walk or wheel around home/community, navigate up and down stairs, show how to get up/down from the ground, ensure home is accessible, demonstrate HEP, complete caregiver training    Physical Therapy Problems (Active)       Problem: Mobility       Dates: Start:  06/28/25         Goal: STG-Within one week, patient will propel wheelchair community >150 ft mod I over indoor and outdoor surfaces        Dates: Start:  06/28/25         Goal Note filed on 06/30/25 1228 by Jennifer Batista DPT       Indoor- met, outdoors TBD              Goal: STG-Within one week, patient will ambulate household distance >25 ft x 2 with CGA consistently, while maintaining TTWB L with FWW       Dates: Start:  06/28/25         Goal Note filed on 06/30/25 1228 by Jennifer Batista DPT       Pt prefers NWB over TTWB demo'ing dec stability and safety              Goal: STG-Within one week, patient will ambulate up/down a 4\" curb with FWW min A        Dates: Start:  06/28/25         Goal Note filed on 06/30/25 1228 by Jennifer Batista DPT       TBD, unsafe on eval d/t inconsistencies with WB               Goal: STG-Within one week, patient will ascend and descend 3 stairs with BHR min A, while maintaining TTWB       Dates: Start:  06/28/25         Goal Note filed on 06/30/25 1228 by Jennifer Batista DPT       TBD, unsafe to on eval                  Problem: PT-Long Term Goals       Dates: Start:  06/28/25         Goal: LTG-By discharge, patient will propel wheelchair mod I >150 ft over outdoor surfaces       Dates: Start:  06/28/25            Goal: LTG-By discharge, patient will ambulate >30ft indoors mod I with FWW, while maintaining TTWB L       Dates: Start:  06/28/25            Goal: LTG-By discharge, patient will " "transfer one surface to another mod I with FWW       Dates: Start:  06/28/25            Goal: LTG-By discharge, patient will ambulate up/down 1 6\" step with FWW + 3 stairs with 1 crutch/ 1 HR CGA to enter/ exit mobile home       Dates: Start:  06/28/25              "

## 2025-07-01 NOTE — PROGRESS NOTES
NURSING DAILY NOTE    Name: Linda Carpenter   Date of Admission: 6/27/2025   Admitting Diagnosis: Fracture of left tibia  Attending Physician: NATIVIDAD MELENDEZ D.O.  Allergies: Cefaclor and Sulfamethoxazole w-trimethoprim    Safety  Patient Assist     Patient Precautions  Precautions: Fall Risk, Weight Bearing  Fall Risk: Poor balance  Weight Bearing: TTWB LLE  Comments: PRAFO boot as tolerated in bed  Nursing Mobility:  Bed Transfer Status:    Toilet Transfer Status: Stand By Assist  Therapy Mobility:  Bed Transfer Status: Contact Guard Assist, Modified Independent (CGA SPT FWW, vs mod I wc level), Stand Step Transfer, FWW  Toilet Transfer Status: Modified Independent, Stand Pivot Transfer, Wheelchair  Assistive Devices  Rails, Wheelchair  Oxygen  None - Room Air  Diet/Therapeutic Dining  Current Diet Order   Procedures    Diet Order Diet: Regular     Pill Administration  whole  Agitated Behavioral Scale     ABS Level of Severity       Fall Risk  Has the patient had a fall this admission?      Rosie Holguin Fall Risk Scoring  14, MODERATE RISK  Fall Risk Safety Measures  bed alarm, chair alarm, poor balance, and low vision/hearing    Vitals  Temperature: 36.7 °C (98 °F)  Temp src: Oral  Pulse: 78  Respiration: 18  Blood Pressure: 99/72  Blood Pressure MAP (Calculated): 81 MM HG  BP Location: Right, Upper Arm  Patient BP Position: Supine     Oxygen  Pulse Oximetry: 93 %  O2 (LPM): 0  O2 Delivery Device: None - Room Air    Bowel and Bladder  Last Bowel Movement  07/29/25  Stool Type  Type 5: Soft blob with clear cut edges (passed easily)  Bowel Device  Toilet  Continent  Bladder: Continent void   Bowel: No movement  Bladder Function  Number of Times Voided: 1  Time Void: Yes  Bladder Device: Toilet  Urine Color: Unable To Evaluate  Urine Clarity: Clear  Genitourinary Assessment   Bladder Assessment (WDL):  Within Defined Limits  Erickson Catheter: Not Applicable  Urine Color: Unable To Evaluate  Urine Clarity:  "Clear  Bladder Device: Toilet  Time Void: Yes  Bladder Scan: Post Void  $ Bladder Scan Results (mL): 21    Skin  Carmelo Score   17  Sensory Interventions   Bed Types: Standard/Trauma Mattress  Skin Preventative Measures: Pillows in Use to Float Heels, Pillows in Use for Support / Positioning  Moisture Interventions         Pain  Pain Rating Scale  10 - As bad as it could be, nothing else matters  Pain Location  Leg  Pain Location Orientation  Left  Pain Interventions   Medication (see MAR)    ADLs    Bathing   Shower  Linen Change      Grooming     Oral Care  Brushed Teeth  Teeth/Dentures     Nutrition Intake  Oral Nutrition Intake  P.O.: 240 mL  Meal Type: Lunch  Percentage Consumed (%): 75 %     Environmental Precautions     Patient Turns/Positioning  Patient turns self independently side to side without assistance, to offload sacral area, Supine  Patient Turns Assistance/Tolerance  Assistance of One      Psychosocial/Neurologic Assessment  Psychosocial Assessment  Psychosocial (WDL):  Within Defined Limits  Neurologic Assessment  Neuro (WDL): Within Defined Limits  Level of Consciousness: Alert  Orientation Level: Oriented X4  Cognition: Appropriate judgement, Appropriate safety awareness, Appropriate attention/concentration, Follows commands  Speech: Clear  Pupil & Vision Field Assessment: No  Motor Function, Sensation & Ataxia Assessment: Sensation  LLE Sensation: Tingling (\"pins and needles\")  EENT (WDL):  Within Defined Limits    Cardio/Pulmonary Assessment  Edema   LLE Edema: Generalized  Respiratory Breath Sounds  RUL Breath Sounds: Clear  RML Breath Sounds: Clear  RLL Breath Sounds: Clear  MARIA E Breath Sounds: Clear  LLL Breath Sounds: Clear  Cardiac Assessment   Cardiac (WDL):  Within Defined Limits        "

## 2025-07-01 NOTE — PROGRESS NOTES
"  Physical Medicine & Rehabilitation Progress Note    Encounter Date: 7/1/2025    Chief Complaint: LE fracture    Interval Events (Subjective):  VS: VSS  Last BM: 6/29  Bladder: Voiding   Schedule Meds Not Given: None   PRN Meds Taken: Oxy 15 - taking ~ 6x per day    Patient seen and examined in her room. She is doing well. Is going to work on finding shower chair. The one she thought she could get is still being used by her mom's friend.  is looking for one. Ok with discharge date and goals for discharge.       ROS: 14 point ROS negative unless otherwise specified in the HPI    Objective:  VITAL SIGNS: BP 99/72   Pulse 78   Temp 36.7 °C (98 °F) (Oral)   Resp 18   Ht 1.636 m (5' 4.41\")   Wt 86.7 kg (191 lb 2.2 oz)   LMP  (LMP Unknown)   SpO2 93%   BMI 32.39 kg/m²     GEN: No apparent distress  HEENT: Head normocephalic, atraumatic.  Sclera nonicteric bilaterally, no ocular discharge appreciated bilaterally.  CV: Extremities warm and well-perfused, no peripheral edema appreciated bilaterally.  PULMONARY: Breathing nonlabored on room air, no respiratory accessory muscle use.  Not requiring supplemental oxygen.  SKIN: No appreciable skin breakdown on exposed areas of skin.  PSYCH: Mood and affect within normal limits.  NEURO: Awake alert.  Conversational.  Logical thought content.      Laboratory Values:  No results found for this or any previous visit (from the past 72 hours).      Medications:  Scheduled Medications[1]  PRN medications: metaxalone, oxyCODONE immediate-release **OR** oxyCODONE immediate-release, lidocaine, hydrALAZINE, senna-docusate **AND** polyethylene glycol/lytes, lactulose, docusate sodium, bisacodyl EC, magnesium hydroxide, sodium phosphate, carboxymethylcellulose, benzocaine-menthol, mag hydrox-al hydrox-simeth, ondansetron **OR** ondansetron, traZODone, sodium chloride, [COMPLETED] acetaminophen **FOLLOWED BY** acetaminophen, ibuprofen    Diet:  Current Diet Order   Procedures "    Diet Order Diet: Regular       Medical Decision Making and Plan:  L traumatic distal tibial fracture s/p ORIF with IMN Dr. Mcdaniel 25  PT and OT for mobility and ADLs. Per guidelines, 15 hours per week between PT, OT and/or SLP.  Follow-up Ortho  TTWB LLE     Pain - Scheduled Tylenol, Shan. S/p IV Ketorolac. PRN ibuprofen.   increase PRN Oxycodone to 10-15 mg every 3 hours as needed.  Discussed that this would only be increased for a few days, then would reduce oxycodone to a lower dose.      Pain more controlled with PRN Oxy 15mg. Monitor and decrease as able.    Taking ~ 6x per day.      EtOH Use + Chronic Tremors - Thiamine, folate, MVN     Mood Disorder - Continue Lexapro     Hypokalemia  -Potassium borderline low 3.5 on 2025.  Will replete with 20 milliequivalents potassium  Repeat      ABLA + Macrocytic Anemia - hemoglobin 9.7 on 2025, stable. Recheck      Vitamin D Deficiency -15 on 2025.  Continue vitamin D supplementation 5000 units daily     GERD - PPI     Bowel - Patient on Senna-docusate for constipation prophylaxis.      Bladder - TV/PVR/BS PRN    Obesity - Nutrition consult        Upcoming Labs/imagin/2      DVT PROPHYLAXIS: Lovenox --> aspirin x 4 weeks in OP setting      HOSPITALIST FOLLOWING: No     CODE STATUS: FULL      DISPO: Home with spouse      JIGAR: 7/3/25     MEDS SENT TO: [facility] in [place]     DISCHARGE SPECIALIST FOLLOW UP: Ortho (Jonas)     Patient to scheduled follow up with their PCP within 2 weeks from discharge from the [facility].    ____________________________________    Dr. Alondra Rausch DO, MS  [facility] - Physical Medicine & Rehabilitation   ____________________________________         [1]   Scheduled Medications   Medication Dose Frequency    senna-docusate  2 Tablet Q EVENING    enoxaparin (LOVENOX) injection  40 mg DAILY AT 1800    escitalopram  10 mg Q EVENING    gabapentin  100 mg TID    omeprazole  40 mg  DAILY    polyethylene glycol/lytes  1 Packet DAILY    vitamin D3  5,000 Units DAILY

## 2025-07-01 NOTE — THERAPY
Occupational Therapy  Daily Treatment     Patient Name:  Linda Carpenter  Age:  29 y.o., Sex:  female  Medical Record #:  [id number]  Today's Date:  7/1/2025    Precautions  Precautions: Fall Risk, Weight Bearing  Fall Risk: Poor balance  Weight Bearing: TTWB LLE  Comments: PRAFO boot as tolerated in bed    Subjective: Pt states that she will not be able to borrow a tub transfer bench like she initially thought.      Objective:    07/01/25 0901   OT Charge Group   OT Therapy Activity (Units) 1   OT Therapeutic Exercise (Units) 1   OT Total Time Spent   OT Individual Total Time Spent (Mins) 30   Precautions   Precautions Fall Risk;Weight Bearing   Fall Risk Poor balance   Weight Bearing TTWB LLE   OT DME Recommendations   Bathroom Equipment (MEDICARE) Tub Transfer Bench (Medicaid Only)   Interdisciplinary Plan of Care Collaboration   IDT Collaboration with    (message sent via [employer] to see if CM can order TTB through medicaid)   Collaboration Comments pt's son present during session. Discussed getting TTB w/primary OT.         Therapeutic Exercise  Purpose: to improve strength  Interventions:   Upper body exercises:   Seated program -   Lat pull, 3 sets of 15, Bilateral, and Weight 25#  Bilateral row, 1 set of 15, Bilateral, and Weight 25#    Assessment:    Pt seen for OT tx session w/focus on upper body strengthening. Discussed tub transfer bench during breaks. Pt does not have access to tub transfer bench like she previously thought. She will need this equipment to safely access her shower. Alerted primary OT and CM.   Strengths: Able to follow instructions, Adequate strength, Alert and oriented, Independent prior level of function, Making steady progress towards goals, Motivated for self care and independence, Pleasant and cooperative, Supportive family, Willingly participates in therapeutic activities  Barriers: Impaired balance, Pain poorly managed    Plan:  Standing tolerance,  strength/endurance     Lives with fiance,1 story, 4 EVERT (steep), tub, 4/8 yo boys, stay at home mom. Will have assist with childcare at d/c. mom (recent hip sx) is letting her borrow her FWW, and BSC to go over the toilet. Needs a tub transfer bench.     DME  OT DME Recommendations  Bathroom Equipment (MEDICARE): (P) Tub Transfer Bench (Medicaid Only)  Bathroom Equipment (NOT TYPICALLY COVERED BY INSURANCE): Tub transfer bench, Hand held shower head (pt has hand held but will not reach if she's sitting down)  Additional Equipment:  (Pt has crutches (from the hospital), and access to her mother's FWW and commode per pt report)  Additional Equipment (NOT TYPICALLY COVERED BY INSURANCE): Reacher    Passport items to be completed:  Perform bathroom transfers, complete dressing, complete feeding, get ready for the day, prepare a simple meal, participate in household tasks, adapt home for safety needs, demonstrate home exercise program, complete caregiver training     Occupational Therapy Goals (Active)       Problem: Dressing       Dates: Start:  06/28/25         Goal: STG-Within one week, patient will dress LB with SBA       Dates: Start:  06/28/25         Goal Note filed on 06/30/25 0822 by Heidi Fong MS,OTR/L       Not yet reassessed                 Problem: IADL's       Dates: Start:  06/30/25         Goal: STG-Within one week, patient will access kitchen area with Min A using DME/AE as needed       Dates: Start:  06/30/25               Problem: OT Long Term Goals       Dates: Start:  06/28/25         Goal: LTG-By discharge, patient will complete basic self care tasks with mod I        Dates: Start:  06/28/25            Goal: LTG-By discharge, patient will perform bathroom transfers with mod I       Dates: Start:  06/28/25            Goal: LTG-By discharge, patient will complete basic home management with mod I       Dates: Start:  06/28/25

## 2025-07-01 NOTE — CARE PLAN
The patient is Stable - Low risk of patient condition declining or worsening    Shift Goals  Clinical Goals: Safety  Patient Goals: pain control  Family Goals: n/a    Progress made toward(s) clinical / shift goals:    Problem: Knowledge Deficit - Standard  Goal: Patient and family/care givers will demonstrate understanding of plan of care, disease process/condition, diagnostic tests and medications  Outcome: Progressing   Patient educated on the POC and medications administered. All questions and concerns addressed at this time  Problem: Fall Risk - Rehab  Goal: Patient will remain free from falls  Outcome: Progressing   Bed is locked and in low position. Call light and belongings within reach  Problem: Pain - Standard  Goal: Alleviation of pain or a reduction in pain to the patient’s comfort goal  Outcome: Progressing   Use of 0-10  pain scale. PRN pain medications administered.

## 2025-07-01 NOTE — CARE PLAN
Problem: Fall Risk - Rehab  Goal: Patient will remain free from falls  Note: Patient demonstrates good safety awareness and judgement this shift.       Problem: Urinary Elimination:  Goal: Ability to reestablish a normal urinary elimination pattern will improve  Outcome: Progressing   The patient is Stable - Low risk of patient condition declining or worsening    Shift Goals  Clinical Goals: Safety  Patient Goals: pain control  Family Goals: n/a

## 2025-07-01 NOTE — DISCHARGE PLANNING
CM//Discharge Planning:    The following has been ordered:       DME:     Preferred DME                      Following equipment has been ordered: standard 16' wheelchair with leg rests and a tub transfer bench     Outpatient therapy:  [facility]    Disciplines ordered: PT and OT    Both referrals sent. Review and acceptance pending.   CM requested DME be delivered to bedside.

## 2025-07-02 ENCOUNTER — APPOINTMENT (OUTPATIENT)
Dept: OCCUPATIONAL THERAPY | Facility: REHABILITATION | Age: 30
DRG: 560 | End: 2025-07-02
Attending: PHYSICAL MEDICINE & REHABILITATION
Payer: MEDICAID

## 2025-07-02 ENCOUNTER — APPOINTMENT (OUTPATIENT)
Dept: PHYSICAL THERAPY | Facility: REHABILITATION | Age: 30
DRG: 560 | End: 2025-07-02
Attending: PHYSICAL MEDICINE & REHABILITATION
Payer: MEDICAID

## 2025-07-02 LAB
ANION GAP SERPL CALC-SCNC: 12 MMOL/L (ref 7–16)
BUN SERPL-MCNC: 5 MG/DL (ref 8–22)
CALCIUM SERPL-MCNC: 9.1 MG/DL (ref 8.5–10.5)
CHLORIDE SERPL-SCNC: 100 MMOL/L (ref 96–112)
CO2 SERPL-SCNC: 23 MMOL/L (ref 20–33)
CREAT SERPL-MCNC: 0.69 MG/DL (ref 0.5–1.4)
ERYTHROCYTE [DISTWIDTH] IN BLOOD BY AUTOMATED COUNT: 75.1 FL (ref 35.9–50)
GFR SERPLBLD CREATININE-BSD FMLA CKD-EPI: 118 ML/MIN/1.73 M 2
GLUCOSE SERPL-MCNC: 88 MG/DL (ref 65–99)
HCT VFR BLD AUTO: 32.1 % (ref 37–47)
HGB BLD-MCNC: 10.5 G/DL (ref 12–16)
MCH RBC QN AUTO: 35 PG (ref 27–33)
MCHC RBC AUTO-ENTMCNC: 32.7 G/DL (ref 32.2–35.5)
MCV RBC AUTO: 107 FL (ref 81.4–97.8)
PLATELET # BLD AUTO: 369 K/UL (ref 164–446)
PMV BLD AUTO: 10 FL (ref 9–12.9)
POTASSIUM SERPL-SCNC: 3.6 MMOL/L (ref 3.6–5.5)
RBC # BLD AUTO: 3 M/UL (ref 4.2–5.4)
SODIUM SERPL-SCNC: 135 MMOL/L (ref 135–145)
WBC # BLD AUTO: 4.6 K/UL (ref 4.8–10.8)

## 2025-07-02 PROCEDURE — 97110 THERAPEUTIC EXERCISES: CPT

## 2025-07-02 PROCEDURE — 80048 BASIC METABOLIC PNL TOTAL CA: CPT

## 2025-07-02 PROCEDURE — A9270 NON-COVERED ITEM OR SERVICE: HCPCS | Performed by: STUDENT IN AN ORGANIZED HEALTH CARE EDUCATION/TRAINING PROGRAM

## 2025-07-02 PROCEDURE — 97535 SELF CARE MNGMENT TRAINING: CPT

## 2025-07-02 PROCEDURE — 700102 HCHG RX REV CODE 250 W/ 637 OVERRIDE(OP): Performed by: PHYSICAL MEDICINE & REHABILITATION

## 2025-07-02 PROCEDURE — 700111 HCHG RX REV CODE 636 W/ 250 OVERRIDE (IP): Mod: JZ | Performed by: PHYSICAL MEDICINE & REHABILITATION

## 2025-07-02 PROCEDURE — 97530 THERAPEUTIC ACTIVITIES: CPT

## 2025-07-02 PROCEDURE — 97116 GAIT TRAINING THERAPY: CPT

## 2025-07-02 PROCEDURE — 36415 COLL VENOUS BLD VENIPUNCTURE: CPT

## 2025-07-02 PROCEDURE — 85027 COMPLETE CBC AUTOMATED: CPT

## 2025-07-02 PROCEDURE — A9270 NON-COVERED ITEM OR SERVICE: HCPCS | Performed by: PHYSICAL MEDICINE & REHABILITATION

## 2025-07-02 PROCEDURE — 700102 HCHG RX REV CODE 250 W/ 637 OVERRIDE(OP): Performed by: STUDENT IN AN ORGANIZED HEALTH CARE EDUCATION/TRAINING PROGRAM

## 2025-07-02 PROCEDURE — 770010 HCHG ROOM/CARE - REHAB SEMI PRIVAT*

## 2025-07-02 PROCEDURE — 99232 SBSQ HOSP IP/OBS MODERATE 35: CPT | Performed by: PHYSICAL MEDICINE & REHABILITATION

## 2025-07-02 RX ORDER — ESCITALOPRAM OXALATE 10 MG/1
10 TABLET ORAL EVERY EVENING
Qty: 30 TABLET | Refills: 2 | Status: SHIPPED | OUTPATIENT
Start: 2025-07-02

## 2025-07-02 RX ORDER — PANTOPRAZOLE SODIUM 40 MG/1
40 TABLET, DELAYED RELEASE ORAL DAILY
Qty: 30 TABLET | Refills: 2 | Status: SHIPPED | OUTPATIENT
Start: 2025-07-02

## 2025-07-02 RX ORDER — CHOLECALCIFEROL (VITAMIN D3) 25 MCG
5000 TABLET ORAL DAILY
Qty: 150 TABLET | Refills: 2 | Status: SHIPPED | OUTPATIENT
Start: 2025-07-02

## 2025-07-02 RX ORDER — ASPIRIN 81 MG/1
81 TABLET ORAL 2 TIMES DAILY
Qty: 60 TABLET | Refills: 2 | Status: SHIPPED | OUTPATIENT
Start: 2025-07-02

## 2025-07-02 RX ORDER — OXYCODONE HYDROCHLORIDE 15 MG/1
15 TABLET ORAL EVERY 4 HOURS PRN
Qty: 42 TABLET | Refills: 0 | Status: SHIPPED | OUTPATIENT
Start: 2025-07-02 | End: 2025-07-03

## 2025-07-02 RX ORDER — GABAPENTIN 100 MG/1
100 CAPSULE ORAL 3 TIMES DAILY
Qty: 90 CAPSULE | Refills: 2 | Status: SHIPPED | OUTPATIENT
Start: 2025-07-02

## 2025-07-02 RX ADMIN — Medication 5000 UNITS: at 07:53

## 2025-07-02 RX ADMIN — ENOXAPARIN SODIUM 40 MG: 100 INJECTION SUBCUTANEOUS at 16:54

## 2025-07-02 RX ADMIN — GABAPENTIN 100 MG: 100 CAPSULE ORAL at 19:54

## 2025-07-02 RX ADMIN — OXYCODONE HYDROCHLORIDE 15 MG: 10 TABLET ORAL at 04:36

## 2025-07-02 RX ADMIN — OXYCODONE HYDROCHLORIDE 15 MG: 10 TABLET ORAL at 18:08

## 2025-07-02 RX ADMIN — OXYCODONE HYDROCHLORIDE 15 MG: 10 TABLET ORAL at 14:40

## 2025-07-02 RX ADMIN — OXYCODONE HYDROCHLORIDE 15 MG: 10 TABLET ORAL at 07:52

## 2025-07-02 RX ADMIN — GABAPENTIN 100 MG: 100 CAPSULE ORAL at 07:53

## 2025-07-02 RX ADMIN — GABAPENTIN 100 MG: 100 CAPSULE ORAL at 14:40

## 2025-07-02 RX ADMIN — ESCITALOPRAM OXALATE 10 MG: 10 TABLET ORAL at 19:54

## 2025-07-02 RX ADMIN — OMEPRAZOLE 40 MG: 20 CAPSULE, DELAYED RELEASE ORAL at 07:52

## 2025-07-02 RX ADMIN — OXYCODONE HYDROCHLORIDE 15 MG: 10 TABLET ORAL at 11:13

## 2025-07-02 RX ADMIN — OXYCODONE HYDROCHLORIDE 15 MG: 10 TABLET ORAL at 21:50

## 2025-07-02 RX ADMIN — POLYETHYLENE GLYCOL 3350 1 PACKET: 17 POWDER, FOR SOLUTION ORAL at 07:52

## 2025-07-02 ASSESSMENT — PAIN DESCRIPTION - PAIN TYPE
TYPE: ACUTE PAIN
TYPE: ACUTE PAIN
TYPE: SURGICAL PAIN
TYPE: ACUTE PAIN
TYPE: ACUTE PAIN;CHRONIC PAIN;SURGICAL PAIN
TYPE: ACUTE PAIN

## 2025-07-02 ASSESSMENT — BRIEF INTERVIEW FOR MENTAL STATUS (BIMS)
ASKED TO RECALL SOCK: YES, NO CUE REQUIRED
INITIAL REPETITION OF BED BLUE SOCK - FIRST ATTEMPT: 3
WHAT MONTH IS IT: ACCURATE WITHIN 5 DAYS
WHAT YEAR IS IT: CORRECT
ASKED TO RECALL BED: YES, NO CUE REQUIRED
ASKED TO RECALL BLUE: YES, NO CUE REQUIRED
WHAT DAY OF THE WEEK IS IT: CORRECT
BIMS SUMMARY SCORE: 15

## 2025-07-02 ASSESSMENT — ACTIVITIES OF DAILY LIVING (ADL)
TOILET_TRANSFER_LEVEL_OF_ASSIST: ABLE TO COMPLETE TOILET TRANSFER WITHOUT ASSIST
TOILETING_LEVEL_OF_ASSIST: ABLE TO COMPLETE TOILETING WITHOUT ASSIST
SHOWER_TRANSFER_LEVEL_OF_ASSIST: ABLE TO COMPLETE SHOWER TRANSFER WITHOUT ASSIST

## 2025-07-02 NOTE — DISCHARGE PLANNING
Case Management Discharge Instructions    NOTE: This information can be found in your final discharge packet that your nurse will give you.      Follow-up Information:     [facility]  [id number]  [place] [address] [phone]  Follow up        [facility] [place]  [address]  [address]  [place] [address]  [phone]  Follow up        Nahum Alcocer M.D.  [address]  [facility] [id number] [phone]  Follow up        Follow up with your Primary Care Provider

## 2025-07-02 NOTE — THERAPY
Physical Therapy   Daily Treatment     Patient Name:  Linda Carpenter  Age:  29 y.o., Sex:  female  Medical Record #:  [id number]  Today's Date: 7/2/2025     Precautions  Precautions: Fall Risk, Weight Bearing  Fall Risk: Poor balance  Weight Bearing: TTWB LLE  Comments: PRAFO boot as tolerated in bed    Subjective: Pt and sig other were agreeable to family training. Both feel prepared for D/C.     Objective:    07/02/25 1231   PT Charge Group   PT Therapeutic Activities (Units) 2   PT Total Time Spent   PT Individual Total Time Spent (Mins) 30   Roll Left and Right   Assistance Needed Adaptive equipment   CARE Score - Roll Left and Right 6   Roll Left and Right   Level of Assist Modified Independent   Sit to Lying   Assistance Needed Adaptive equipment   CARE Score - Sit to Lying 6   Sit to Lying   Level of Assist Modified Independent   Lying to Sitting on Side of Bed   Assistance Needed Adaptive equipment   CARE Score - Lying to Sitting on Side of Bed 6   Lying to Sitting on Side of Bed   Level of Assist Modified Independent   Sit to Stand   Assistance Needed Adaptive equipment   CARE Score - Sit to Stand 6   Sit to Stand   Level of Assist Modified Independent   Assistive Devices FWW   Chair/Bed-to-Chair Transfer   Assistance Needed Adaptive equipment   CARE Score - Chair/Bed-to-Chair Transfer 6   Chair/Bed-to-Chair Transfer   Level of Assist Modified Independent   Transfer Type Squat Pivot Transfer   Toilet Transfer   Assistance Needed Adaptive equipment   CARE Score - Toilet Transfer 6   Toilet Transfer   Level of Assist Modified Independent   Toileting Hygiene   Assistance Needed Adaptive equipment   CARE Score - Toileting Hygiene 6   Toileting Hygiene   Level of Assist Modified Independent   Car Transfer   Assistance Needed Adaptive equipment   CARE Score - Car Transfer 6   Car Transfer   Level of Assist Modified Independent   Transfer Type Squat Pivot Transfer   Assistive Device Wheelchair   Additional  "Description Completed in patient's personal vehicle   Walk 10 Feet   Assistance Needed Set-up / clean-up;Adaptive equipment   CARE Score - Walk 10 Feet 5   Walk 50 Feet with Two Turns   Assistance Needed Set-up / clean-up;Adaptive equipment   CARE Score - Walk 50 Feet with Two Turns 5   Walk 150 Feet   Reason if not Attempted Safety concerns  (Dec activity tolerance d/t TTWB)   CARE Score - Walk 150 Feet 88   Walking 10 Feet on Uneven Surfaces   Assistance Needed Incidental touching;Adaptive equipment   CARE Score - Walking 10 Feet on Uneven Surfaces 4   Ambulation   Level of Assist Set up   Assistive Device FWW   Distance 50   1 Step (Curb)   Assistance Needed Incidental touching;Adaptive equipment   CARE Score - 1 Step (Curb) 4   Curbs   Level of Assist Contact Guard Assist   4 Steps   Assistance Needed Set-up / clean-up  (pt prefers bump up method; able to maintain TTWB)   CARE Score - 4 Steps 5   12 Steps   Assistance Needed Set-up / clean-up;Adaptive equipment  (bump up method)   CARE Score - 12 Steps 5   Stairs   Level of Assist Set up   Stair Height 8\" step   Additional Description Other (see comments)  (bump up method, maintaining TTWB)   Stairs Amount 4   Picking Up Object   Assistance Needed Adaptive equipment  (reacher, unilateral UE  support)   CARE Score - Picking Up Object 6   Wheel 50 Feet with Two Turns   Assistance Needed Adaptive equipment   CARE Score - Wheel 50 Feet with Two Turns 6   Type of Wheelchair/Scooter Manual   Wheel 150 Feet   Assistance Needed Adaptive equipment   CARE Score - Wheel 150 Feet 6   Type of Wheelchair/Scooter Manual   Wheelchair   Level of Assist Modified Independent   Type Manual   Wheelchair Distance 150   Family Training   Training Completed   Date 07/02/25   Time 1230   Family Member(s) Present Spouse/Significant other;Son   Minutes 30 minutes   Training Items Completed Indoor ambulation;Car Transfers;Stairs;Curbs;DME recommendations;Floor recovery;Fall prevention "   Interdisciplinary Plan of Care Collaboration   IDT Collaboration with  Family / Caregiver   Patient Position at End of Therapy Seated;Family / Friend in Room   Collaboration Comments POC, D/C recommendations, stair safety, car transfer, indoor amb, use of gait belt and guarding/ set up techniques   PT Discharge Summary   Discharge Location Home   Patient Discharging with Assist of Family;Spouse / Significant Other   Level of Supervision Required Upon Discharge Intermittent Supervision   Recommended Equipment for Discharge Front-Wheeled Walker;Manual Wheelchair   Recommeded Services Upon Discharge Home Health Physical Therapy   Long Term Goals Met 0:4- See POC, partially all 4:4   Long Term Goals Not Met 4:4- See POC- partially met all 4:4   Reason(s) for Goals Not Met FOF, dec activity tolerance, pefers to bump up stairs on bottom for inc safety, mod I wc level transfers vs Set up walker level   Discharge Instructions to Patient   Weight Bearing Status - Patient Should Bear Toe-Touch Weight Left Leg   Level of Assist Required for Ambulation Assist for Balance on Flat Surfaces;Physical Assist on Curbs;Physical Assist on Stairs;Other (See Comments)  (Ok to bump up stairs if preferrable for safety)   Distance Patient May Ambulate 50 ft or until fatigued   Device Recommended for Ambulation Front-Wheeled Walker   Level of Assist Required to Propel Wheelchair Requires No Assist   Level of Assist Required for Transfers Requires No Assist   Device Recommended for Transfers Front-Wheeled Walker  (vs reach pivot/ squat pivot methods at wc level)   Home Exercise Program None Issued  (Pt reported recall of exercises previously reviewed)       Therapeutic Activities  Purpose: to improve performance and function of daily activities, to provide patient and family education, and to increase safety with activities of daily life and mobility related to activities of daily life  Interventions:   Bed/chair transfer training  Bed  mobility training (scooting, supine to sit, sit to supine, rolling)  Sit to stand training  Car transfer    Gait Training  Purpose: to improve functional ambulation and to improve walking endurance  Interventions:   Safe use of device  Walking endurance  Deviations (laterality in comments):  Antalgic  Bradykinetic  Decreased [place]  Decreased heel strike  Stair Training  Purpose: to improve functional use of stairs, to improve stair climbing endurance, and bump up method, per pt preference  Interventions:   Step up/downs  Curb training  EOM <> step stool <> floor 2x set up assist     Wheelchair Management, and Parts Training  Purpose: to foster independent use of a wheelchair   Interventions:   Wheelchair training    Discharge Interventions  Purpose: to complete discharge assessments in preparation for upcoming discharge from facility and to review final discharge recommendations and education  Interventions:   completed discharge IRF-STEWART items  completed patient passport  reviewed relevant DME and adaptive equipment recommendations  discussed home safety  discussed floor recovery/fall prevention  reviewed HEP with handout provided - Reviewed, patient declined handout    Assessment: Family training completed with sig other for indoor amb, stair safety and car transfers into their vehicle. After review of 1 crutch, 1 handrail use of stairs, and sequencing, pt preferred to complete stair mobility training use bump up method, d/t FOF. Pt and sig other demo readiness for D/C.     Strengths: Effective communication skills, Independent prior level of function, Motivated for self care and independence, Pleasant and cooperative, Willingly participates in therapeutic activities  Barriers: Decreased endurance, Fatigue, Home accessibility, Impaired activity tolerance, Impaired balance, Limited mobility, Pain, Pain poorly managed (TTWB L)    Plan:   D/C home wth manual wc and home health services.  Recommend support services for  "alcohol dependency.    DME    PT DME Recommendations  Wheelchair: 16\" Width, David Height (16\"-17\"), Lightweight, Elevating Leg Rests (left elevating leg rest)  Cushion: Standard  Assistive Device: Front Wheeled Walker  Additional Equipment:  (Pt has crutches (from the hospital), and access to her mother's FWW and commode per pt report)      Passport items to be completed: Completed    Physical Therapy Problems (Active)       Problem: Mobility       Dates: Start:  06/28/25         Goal: STG-Within one week, patient will propel wheelchair community >150 ft mod I over indoor and outdoor surfaces        Dates: Start:  06/28/25         Goal Note filed on 06/30/25 1228 by Jennifer Batista DPT       Indoor- met, outdoors TBD              Goal: STG-Within one week, patient will ambulate household distance >25 ft x 2 with CGA consistently, while maintaining TTWB L with FWW       Dates: Start:  06/28/25         Goal Note filed on 06/30/25 1228 by Jennifer Batista DPT       Pt prefers NWB over TTWB demo'ing dec stability and safety              Goal: STG-Within one week, patient will ambulate up/down a 4\" curb with FWW min A        Dates: Start:  06/28/25         Goal Note filed on 06/30/25 1228 by Jennifer Batista DPT       TBD, unsafe on eval d/t inconsistencies with WB               Goal: STG-Within one week, patient will ascend and descend 3 stairs with BHR min A, while maintaining TTWB       Dates: Start:  06/28/25         Goal Note filed on 06/30/25 1228 by Jennifer Batista DPT       TBD, unsafe to on eval                  Problem: PT-Long Term Goals       Dates: Start:  06/28/25         Goal: LTG-By discharge, patient will propel wheelchair mod I >150 ft over outdoor surfaces       Dates: Start:  06/28/25         Goal Note filed on 07/02/25 1411 by Jennifer Batista DPT       Outdoor surfaces NT, fatigues easily, mod I indoor surfaces, son pushes her in wc when fatigued              Goal: LTG-By discharge, patient will ambulate " ">30ft indoors mod I with FWW, while maintaining TTWB L       Dates: Start:  06/28/25         Goal Note filed on 07/02/25 1411 by Jennifer Batista DPT       Set up                Goal: LTG-By discharge, patient will transfer one surface to another mod I with FWW       Dates: Start:  06/28/25         Goal Note filed on 07/02/25 1411 by Jennifer Batista DPT       Mod I SQPT, set up FWW              Goal: LTG-By discharge, patient will ambulate up/down 1 6\" step with FWW + 3 stairs with 1 crutch/ 1 HR CGA to enter/ exit mobile home       Dates: Start:  06/28/25         Goal Note filed on 07/02/25 1411 by Jennifer Batista DPT       Pt prefers bump up method for inc safety, FOF                "

## 2025-07-02 NOTE — CARE PLAN
The patient is Stable - Low risk of patient condition declining or worsening    Shift Goals  Clinical Goals: safety  Patient Goals: pain control  Family Goals: n/a    Progress made toward(s) clinical / shift goals:    Problem: Knowledge Deficit - Standard  Goal: Patient and family/care givers will demonstrate understanding of plan of care, disease process/condition, diagnostic tests and medications  Outcome: Progressing   Patient educated on the POC and medications administered. All questions and concerns addressed at this time   Problem: Fall Risk - Rehab  Goal: Patient will remain free from falls  Outcome: Progressing   Bed is locked and in low position. Call light and belongings within reach  Problem: Pain - Standard  Goal: Alleviation of pain or a reduction in pain to the patient’s comfort goal  Outcome: Progressing

## 2025-07-02 NOTE — DISCHARGE INSTRUCTIONS
[facility] NURSING DISCHARGE INSTRUCTIONS    Blood Pressure: 107/66  Weight: 86.7 kg (191 lb 2.2 oz)  Nursing recommendations for Linda Carpenter at time of discharge are as follows:  Client verbalized understanding of all discharge instructions and prescriptions.     Review all your home medications and newly ordered medications with your doctor and/or pharmacist. Follow medication instructions as directed by your doctor and/or pharmacist.    Pain Management:   Discharge Pain Medication Instructions:  Comfort Goal: Comfort with Movement  Notify your primary care provider if pain is unrelieved with these measures, if the pain is new, or increased in intensity.    Discharge Skin Characteristics: Dry  Discharge Skin Exam: Other (Comments) (Please refer to wound documentation)  Skin / Wound Care Instructions: Please contact your primary care physician for any change in skin integrity.     If You Have Surgical Incisions / Wounds:  Monitor surgical site(s) for signs of increased swelling, redness or symptoms of drainage from the site or fever as this could indicate signs and symptoms of infection. If these symptoms are noted, notifiy your primary care provider.      Discharge Safety Instructions: No Supervision Needed     Discharge Safety Concerns: Weakness, History Of Falls  The interdisciplinary team has made recommendation that you do not require supervision in the house due to demonstration of safety with ADL's and IADLS and problem solving skills  Anti-embolic stockings are not required to increase circulation to the lower extremities.    Discharge Diet: Regular     Discharge Liquids: Thin  Discharge Bowel Function: Continent  Please contact your primary care physician for any changes in bowel habits.  Discharge Bowel Program:    Discharge Bladder Function: Continent  Discharge Urinary Devices: None      Nursing Discharge Plan:        Case Management Discharge Instructions:    Discharge Location:    Agency Name/Address/Phone:    Home Health:    Outpatient Services:    DME Provider/Phone:    Medical Equipment Ordered:    Prescription Faxed to:        Discharge Medication Instructions:  Below are the medications your physician expects you to take upon discharge:      Understanding Your Risk for Falls    Each year, millions of people have serious injuries from falls. It is important to understand your risk for falling. Talk with your health care provider about your risk and what you can do to lower it. There are actions you can take at home to lower your risk and prevent falls.  If you do have a serious fall, make sure to tell your health care provider. Falling once raises your risk of falling again.  How can falls affect me?  Serious injuries from falls are common. These include:  Broken bones, such as hip fractures.  Head injuries, such as traumatic brain injuries (TBI) or concussion.  A fear of falling can cause you to avoid activities and stay at home. This can make your muscles weaker and actually raise your risk for a fall.  What can increase my risk?  There are a number of risk factors that increase your risk for falling. The more risk factors you have, the higher your risk of falling. Serious injuries from a fall happen most often to people older than age 65. Children and young adults ages 15-29 are also at higher risk.  Common risk factors include:  Weakness in the lower body.  Lack (deficiency) of vitamin D.  Being generally weak or confused due to long-term (chronic) illness.  Dizziness or balance problems.  Poor vision.  Medicines that cause dizziness or drowsiness. These can include medicines for your blood pressure, heart, anxiety, insomnia, or edema, as well as pain medicines and muscle relaxants.  Other risk factors include:  Drinking alcohol.  Having had a fall in the past.  Having depression.  Having foot pain or wearing improper footwear.  Working at a dangerous  job.  Having any of the following in your home:  Tripping hazards, such as floor clutter or loose rugs.  Poor lighting.  Pets.  Having dementia or memory loss.  What actions can I take to lower my risk of falling?         Physical activity  Maintain physical fitness. Do strength and balance exercises. Consider taking a regular class to build strength and balance. Yoga and rashard chi are good options.  Vision  Have your eyes checked every year and your vision prescription updated as needed.  Walking aids and footwear  Wear nonskid shoes. Do not wear high heels.  Do not walk around the house in socks or slippers.  Use a cane or walker as told by your health care provider.  Home safety  Attach secure railings on both sides of your stairs.  Install grab bars for your tub, shower, and toilet. Use a bath mat in your tub or shower.  Use good lighting in all rooms. Keep a flashlight near your bed.  Make sure there is a clear path from your bed to the bathroom. Use night-lights.  Do not use throw rugs. Make sure all carpeting is taped or tacked down securely.  Remove all clutter from walkways and stairways, including extension cords.  Repair uneven or broken steps.  Avoid walking on icy or slippery surfaces. Walk on the grass instead of on icy or slick sidewalks. Use ice melt to get rid of ice on walkways.  Use a cordless phone.  Questions to ask your health care provider  Can you help me check my risk for a fall?  Do any of my medicines make me more likely to fall?  Should I take a vitamin D supplement?  What exercises can I do to improve my strength and balance?  Should I make an appointment to have my vision checked?  Do I need a bone density test to check for weak bones or osteoporosis?  Would it help to use a cane or a walker?  Where to find more information  Centers for Disease Control and Prevention, [place]INDY: www.cdc.gov  Community-Based Fall Prevention Programs: www.cdc.gov  National [place] on Aging:  www.linda.nih.gov  Contact a health care provider if:  You fall at home.  You are afraid of falling at home.  You feel weak, drowsy, or dizzy.  Summary  Serious injuries from a fall happen most often to people older than age 65. Children and young adults ages 15-29 are also at higher risk.  Talk with your health care provider about your risks for falling and how to lower those risks.  Taking certain precautions at home can lower your risk for falling.  If you fall, always tell your health care provider.  This information is not intended to replace advice given to you by your health care provider. Make sure you discuss any questions you have with your health care provider.  Document Revised: 07/21/2021 Document Reviewed: 07/21/2021  [employer] Patient Education © 2023 [employer]      Incision and Drainage, Care After    This sheet gives you information about how to care for yourself after your procedure. Your health care provider may also give you more specific instructions. If you have problems or questions, contact your health care provider.  What can I expect after the procedure?  After the procedure, it is common to have:  Pain or discomfort around the incision site.  Blood, fluid, or pus (drainage) from the incision.  Redness and firm skin around the incision site.  Follow these instructions at home:  Medicines  Take over-the-counter and prescription medicines only as told by your health care provider.  If you were prescribed an antibiotic medicine, use or take it as told by your health care provider. Do not stop using the antibiotic even if you start to feel better.  Wound care  Follow instructions from your health care provider about how to take care of your wound. Make sure you:  Wash your hands with soap and water before and after you change your bandage (dressing). If soap and water are not available, use hand .  Change your dressing and packing as told by your health care provider.  If your  dressing is dry or stuck when you try to remove it, moisten or wet the dressing with saline or water so that it can be removed without harming your skin or tissues.  If your wound is packed, leave it in place until your health care provider tells you to remove it. To remove the packing, moisten or wet the packing with saline or water so that it can be removed without harming your skin or tissues.  Leave stitches (sutures), skin glue, or adhesive strips in place. These skin closures may need to stay in place for 2 weeks or longer. If adhesive strip edges start to loosen and curl up, you may trim the loose edges. Do not remove adhesive strips completely unless your health care provider tells you to do that.  Check your wound every day for signs of infection. Check for:  More redness, swelling, or pain.  More fluid or blood.  Warmth.  Pus or a bad smell.  If you were sent home with a drain tube in place, follow instructions from your health care provider about:  How to empty it.  How to care for it at home.    General instructions  Rest the affected area.  Do not take baths, swim, or use a hot tub until your health care provider approves. Ask your health care provider if you may take showers. You may only be allowed to take sponge baths.  Return to your normal activities as told by your health care provider. Ask your health care provider what activities are safe for you. Your health care provider may put you on activity or lifting restrictions.  The incision will continue to drain. It is normal to have some clear or slightly bloody drainage. The amount of drainage should lessen each day.  Do not apply any creams, ointments, or liquids unless you have been told to by your health care provider.  Keep all follow-up visits as told by your health care provider. This is important.  Contact a health care provider if:  Your cyst or abscess returns.  You have more redness, swelling, or pain around your incision.  You have more  fluid or blood coming from your incision.  Your incision feels warm to the touch.  You have pus or a bad smell coming from your incision.  You have red streaks above or below the incision site.  Get help right away if:  You have severe pain or bleeding.  You cannot eat or drink without vomiting.  You have a fever or chills.  You have redness that spreads quickly.  You have decreased urine output.  You become short of breath.  You have chest pain.  You cough up blood.  The affected area becomes numb or starts to tingle.  These symptoms may represent a serious problem that is an emergency. Do not wait to see if the symptoms will go away. Get medical help right away. Call your local emergency services (911 in the [place] Do not drive yourself to the hospital.  Summary  After this procedure, it is common to have fluid, blood, or pus coming from the surgery site.  Follow all home care instructions. You will be told how to take care of your incision, how to check for infection, and how to take medicines.  If you were prescribed an antibiotic medicine, take it as told by your health care provider. Do not stop taking the antibiotic even if you start to feel better.  Contact a health care provider if you have increased redness, swelling, or pain around your incision. Get help right away if you have chest pain, you vomit, you cough up blood, or you have shortness of breath.  Keep all follow-up visits as told by your health care provider. This is important.  This information is not intended to replace advice given to you by your health care provider. Make sure you discuss any questions you have with your health care provider.  Document Revised: 03/23/2023 Document Reviewed: 09/29/2022  [employer] Patient Education © 2023 [employer]        Occupational Therapy Discharge Instructions for Linda Carpenter    7/2/2025    Level of Assist Required for Eating: Able to Complete Eating without Assist  Level of Assist Required for  Grooming: Able to Complete Grooming without Assist  Level of Assist Required for Dressing: Able to Complete Dressing without Assist  Level of Assist Required for Toileting: Able to Complete Toileting without Assist  Level of Assist Required for Toilet Transfer: Able to Complete Toilet Transfer without Assist  Equipment for Toilet Transfer: Grab Bars by Toilet  Level of Assist Required for Bathing: Able to Complete Bathing without Assist  Equipment for Bathing: Grab Bars in Tub / Shower, Tub Transfer Bench, Hand Held Shower Head  Level of Assist Required for Shower Transfer: Able to Complete Shower Transfer without Assist  Equipment for Shower Transfer: Grab Bars in Tub / Shower, Tub Transfer Bench  Level of Assist Required for Home Mgmt: Requires Supervision with Home Management  Level of Assist Required for Meal Prep: Requires Supervision with Meal Preparation  Driving: May not Drive, Please Contact Physician for Further Information

## 2025-07-02 NOTE — CARE PLAN
Problem: Dressing  Goal: STG-Within one week, patient will dress LB with SBA  Outcome: Met     Problem: OT Long Term Goals  Goal: LTG-By discharge, patient will complete basic self care tasks with mod I   Outcome: Met  Goal: LTG-By discharge, patient will perform bathroom transfers with mod I  Outcome: Met  Goal: LTG-By discharge, patient will complete basic home management with mod I  Outcome: Met     Problem: IADL's  Goal: STG-Within one week, patient will access kitchen area with Min A using DME/AE as needed  Outcome: Met

## 2025-07-02 NOTE — CARE PLAN
"  Problem: PT-Long Term Goals  Goal: LTG-By discharge, patient will propel wheelchair mod I >150 ft over outdoor surfaces  Outcome: Progressing  Note: Outdoor surfaces NT, fatigues easily, mod I indoor surfaces, son pushes her in wc when fatigued  Goal: LTG-By discharge, patient will ambulate >30ft indoors mod I with FWW, while maintaining TTWB L  Outcome: Progressing  Note: Set up    Goal: LTG-By discharge, patient will transfer one surface to another mod I with FWW  Outcome: Progressing  Note: Mod I SQPT, set up FWW  Goal: LTG-By discharge, patient will ambulate up/down 1 6\" step with FWW + 3 stairs with 1 crutch/ 1 HR CGA to enter/ exit mobile home  Outcome: Progressing  Note: Pt prefers bump up method for inc safety, FOF     "

## 2025-07-02 NOTE — PROGRESS NOTES
NURSING DAILY NOTE    Name: Linda Carpenter   Date of Admission: 6/27/2025   Admitting Diagnosis: Fracture of left tibia  Attending Physician: NATIVIDAD MELENDEZ D.O.  Allergies: Cefaclor and Sulfamethoxazole w-trimethoprim    Safety  Patient Assist     Patient Precautions  Precautions: Fall Risk, Weight Bearing  Fall Risk: Poor balance  Weight Bearing: TTWB LLE  Comments: PRAFO boot as tolerated in bed  Bed Transfer Status  Modified Independent  Toilet Transfer Status   Modified Independent  Assistive Devices  Wheelchair  Oxygen  None - Room Air  Diet/Therapeutic Dining  Current Diet Order   Procedures    Diet Order Diet: Regular     Pill Administration  whole  Agitated Behavioral Scale     ABS Level of Severity       Fall Risk  Has the patient had a fall this admission?      Rosie Holguin Fall Risk Scoring  14, MODERATE RISK  Fall Risk Safety Measures  bed alarm, chair alarm, poor balance, and low vision/hearing    Vitals  Temperature: 36.4 °C (97.6 °F)  Temp src: Temporal  Pulse: 79  Respiration: 18  Blood Pressure: 119/79  Blood Pressure MAP (Calculated): 92 MM HG  BP Location: Right, Upper Arm  Patient BP Position: Supine     Oxygen  Pulse Oximetry: 93 %  O2 (LPM): 0  O2 Delivery Device: None - Room Air    Bowel and Bladder  Last Bowel Movement  06/29/25  Stool Type  Type 5: Soft blob with clear cut edges (passed easily)  Bowel Device  Toilet  Continent  Bladder: Continent void   Bowel: No movement  Bladder Function  Urine Void (mL):  (LARGE)  Number of Times Voided: 1  Urine Color: Unable To Evaluate  Urine Clarity: Clear  Genitourinary Assessment   Bladder Assessment (WDL):  Within Defined Limits  Erickson Catheter: Not Applicable  Urine Color: Unable To Evaluate  Urine Clarity: Clear  Bladder Device: Toilet  Time Void: Yes  Bladder Scan: Post Void  $ Bladder Scan Results (mL): 21    Skin  Carmelo Score   17  Sensory Interventions   Bed Types: Standard/Trauma Mattress with Overlay  Skin Preventative  "Measures: Pillows in Use for Support / Positioning  Moisture Interventions         Pain  Pain Rating Scale  10 - As bad as it could be, nothing else matters  Pain Location  Knee, Foot  Pain Location Orientation  Left  Pain Interventions   Medication (see MAR)    ADLs    Bathing   Shower  Linen Change      Personal Hygiene     Chlorhexidine Bath      Oral Care  Brushed Teeth  Teeth/Dentures     Shave     Nutrition Percentage Eaten     Environmental Precautions  Treaded Slipper Socks on Patient, Personal Belongings, Wastebasket, Call Bell etc. in Easy Reach, Bed in Low Position  Patient Turns/Positioning  Patient turns self independently side to side without assistance, to offload sacral area  Patient Turns Assistance/Tolerance  Assistance of One  Bed Positions     Head of Bed Elevated  Self regulated      Psychosocial/Neurologic Assessment  Psychosocial Assessment  Psychosocial (WDL):  Within Defined Limits  Neurologic Assessment  Neuro (WDL): Within Defined Limits  Level of Consciousness: Alert  Orientation Level: Oriented X4  Cognition: Follows commands, Appropriate attention/concentration  Speech: Clear  Pupil & Vision Field Assessment: No  Motor Function, Sensation & Ataxia Assessment: Sensation  LLE Sensation: Tingling (\"pins and needles\")  EENT (WDL):  Within Defined Limits    Cardio/Pulmonary Assessment  Edema   LLE Edema: Generalized  Respiratory Breath Sounds  RUL Breath Sounds: Clear  RML Breath Sounds: Clear  RLL Breath Sounds: Clear  MARIA E Breath Sounds: Clear  LLL Breath Sounds: Clear  Cardiac Assessment   Cardiac (WDL):  Within Defined Limits            "

## 2025-07-02 NOTE — THERAPY
Occupational Therapy  Daily Treatment     Patient Name:  Linda Carpenter  Age:  29 y.o., Sex:  female  Medical Record #:  [id number]  Today's Date:  7/2/2025    Precautions  Precautions: Fall Risk, Weight Bearing  Fall Risk: Poor balance  Weight Bearing: TTWB LLE  Comments: PRAFO boot as tolerated in bed    Subjective: Pt seated in bed upon arrival, pleasant and cooperative, agreeable to therapy. Declined shower. Son present throughout session     Objective:    07/02/25 1415   OT Charge Group   OT Therapy Activity (Units) 2   OT Therapeutic Exercise (Units) 1   OT Total Time Spent   OT Individual Total Time Spent (Mins) 45   Interdisciplinary Plan of Care Collaboration   Patient Position at End of Therapy Seated;Chair Alarm On;Self Releasing Lap Belt Applied;Call Light within Reach;Tray Table within Reach     Therapeutic Activities  Purpose: to provide patient and family education  Interventions:  Collaborated with RN re: rashawn on acewrapping L LE, covering incision for showers. Supplies provided for shower safety (foam pads of various sizes, plastic bags, tape)    Therapeutic Exercise  Purpose: to improve strength, to improve ROM/flexibility, and to improve functional endurance  Interventions:   Seated BUE therex using 5lb dumbbell: shoulder press, chest press, shoulder IR/ER, bicep curls 2 sets x 15     Discharge Interventions  Purpose: to review final discharge recommendations and education  Interventions:   completed discharge IRF-STEWART items  completed patient passport  reviewed relevant DME and adaptive equipment recommendations  discussed home safety  reviewed relevant precautions to maximize safety during home and community mobility, ADLs, and IADLs    Assessment:    With RN collaboration - pt demo good understanding that LLE ace wrapping is optional for compression and protection purposes. To not over wrap too tight or have too many layers. Check for signs of impaired skin integrity daily, communicate any  changes with home health providers and MD in follow-up appointment.     Strengths: Able to follow instructions, Adequate strength, Alert and oriented, Independent prior level of function, Making steady progress towards goals, Motivated for self care and independence, Pleasant and cooperative, Supportive family, Willingly participates in therapeutic activities  Barriers: Impaired balance, Pain poorly managed    Plan:  D/c tomorrow    Occupational Therapy Goals (Active)       There are no active problems.

## 2025-07-02 NOTE — THERAPY
"Occupational Therapy  Daily Treatment     Patient Name:  Linda Carpenter  Age:  29 y.o., Sex:  female  Medical Record #:  [id number]  Today's Date:  7/2/2025    Precautions  Precautions: Fall Risk, Weight Bearing  Fall Risk: Poor balance  Weight Bearing: (P) TTWB LLE  Comments: PRAFO boot as tolerated in bed    Subjective: Pt states that she had to take a shower last night and did not want to do one during session this morning.     Objective:    07/02/25 0831   OT Charge Group   OT Self Care / ADL (Units) 1   OT Therapeutic Exercise (Units) 3   OT Total Time Spent   OT Individual Total Time Spent (Mins) 60   Precautions   Weight Bearing TTWB LLE   Cognitive Pattern Assessment   Cognitive Pattern Assessment Used BIMS   Brief Interview for Mental Status (BIMS)   Repetition of Three Words (First Attempt) 3   Temporal Orientation: Year Correct   Temporal Orientation: Month Accurate within 5 days   Temporal Orientation: Day Correct   Recall: \"Sock\" Yes, no cue required   Recall: \"Blue\" Yes, no cue required   Recall: \"Bed\" Yes, no cue required   BIMS Summary Score 15   Confusion Assessment Method (CAM)   Is there evidence of an acute change in mental status from the patient's baseline? No   Inattention Behavior not present   Disorganized thinking Behavior not present   Altered level of consciousness Behavior not present   Eating   Assistance Needed Independent   CARE Score - Eating 6   Oral Hygiene   Assistance Needed Independent   CARE Score - Oral Hygiene 6   Upper Body Dressing   Assistance Needed Independent   CARE Score - Upper Body Dressing 6   Lower Body Dressing   Assistance Needed Independent   CARE Score - Lower Body Dressing 6   Tub/Shower Transfer   Level of Assist Modified Independent   Transfer Type Stand step transfer   Adaptive Equipment Use of tub transfer bench  (dry tub transfer using TTB)   Assistive Device FWW   Toilet Transfer   Assistance Needed Independent   CARE Score - Toilet Transfer 6 "   Toilet Transfer   Level of Assist Modified Independent   Transfer Type Stand Pivot Transfer   Adaptive Equipment Grab bars   Assistive Device Wheelchair   Toileting Hygiene   Assistance Needed Independent   CARE Score - Toileting Hygiene 6   Toileting Hygiene   Level of Assist Modified Independent   Additional Description Grab bars         Therapeutic Exercise  Purpose: to improve strength  Interventions:   Upper body exercises:   Seated program -   Chest fly, 2 sets of 15 and Weight 5lb  Shoulder press, 2 sets of 15, Bilateral, and Weight 5lb  Lat pull, 3 sets of 15, Bilateral, and Weight 20-30#  Bicep curls, 2 sets of 15, Bilateral 5lb    Gross Motor Skills  Purpose: to improve function, safety, and independence with tasks requiring gross motor skills  Interventions:   Ball toss  To upright trampoline while seated in   Functional mobility w/FWW about 25ft. Pt choosing to do NWB vs TTWB.     Discussed Passport to [place] items. Pt is confident she will be able to complete cooking and chores around the home from her wheelchair. Discussed avoiding vacuuming and sweeping in standing until she is cleared to put weight through her left leg.     Assessment:    Pt seen for OT tx session with focus on strengthening and preparing for DC tomorrow. Pt was able to safely demonstrate tub transfer w/TTB. Tolerated UE exercises fair.   Strengths: Able to follow instructions, Adequate strength, Alert and oriented, Independent prior level of function, Making steady progress towards goals, Motivated for self care and independence, Pleasant and cooperative, Supportive family, Willingly participates in therapeutic activities  Barriers: Impaired balance, Pain poorly managed    Plan:  DC home tomorrow    DME  OT DME Recommendations  Bathroom Equipment (MEDICARE): Tub Transfer Bench (Medicaid Only)  Bathroom Equipment (NOT TYPICALLY COVERED BY INSURANCE): Tub transfer bench, Hand held shower head (pt has hand held but will not  reach if she's sitting down)  Additional Equipment:  (Pt has crutches (from the hospital), and access to her mother's FWW and commode per pt report)  Additional Equipment (NOT TYPICALLY COVERED BY INSURANCE): Reacher    Passport items to be completed:  Perform bathroom transfers, complete dressing, complete feeding, get ready for the day, prepare a simple meal, participate in household tasks, adapt home for safety needs, demonstrate home exercise program, complete caregiver training     Occupational Therapy Goals (Active)       Problem: Dressing       Dates: Start:  06/28/25         Goal: STG-Within one week, patient will dress LB with SBA       Dates: Start:  06/28/25         Goal Note filed on 06/30/25 0822 by Heidi Fong MS,OTR/L       Not yet reassessed                 Problem: IADL's       Dates: Start:  06/30/25         Goal: STG-Within one week, patient will access kitchen area with Min A using DME/AE as needed       Dates: Start:  06/30/25               Problem: OT Long Term Goals       Dates: Start:  06/28/25         Goal: LTG-By discharge, patient will complete basic self care tasks with mod I        Dates: Start:  06/28/25            Goal: LTG-By discharge, patient will perform bathroom transfers with mod I       Dates: Start:  06/28/25            Goal: LTG-By discharge, patient will complete basic home management with mod I       Dates: Start:  06/28/25

## 2025-07-02 NOTE — CARE PLAN
"The patient is Watcher - Medium risk of patient condition declining or worsening    Shift Goals  Clinical Goals: safety  Problem: Fall Risk - Rehab  Goal: Patient will remain free from falls  Note: Rosie Holguin Fall risk Assessment Score: 14    Patient is mod-I during the day.  Moderate fall risk Interventions  - Bed and strip alarm   - Yellow sign by the door   - Yellow wrist band \"Fall risk\"  - Do not leave patient unattended in the bathroom  - Fall risk education provided     Problem: Pain - Standard  Goal: Alleviation of pain or a reduction in pain to the patient’s comfort goal  Note: Oxycodone administered as ordered for pain.     "

## 2025-07-02 NOTE — THERAPY
Physical Therapy   Daily Treatment     Patient Name:  Linda Carpenter  Age:  29 y.o., Sex:  female  Medical Record #:  [id number]  Today's Date: 7/2/2025     Precautions  Precautions: Fall Risk, Weight Bearing  Fall Risk: Poor balance  Weight Bearing: TTWB LLE  Comments: PRAFO boot as tolerated in bed    Subjective: Patient pleasant and agreeable to participate. Reports she feels ready to discharge home tomorrow.      Objective:    07/02/25 0931   PT Charge Group   PT Gait Training (Units) 1   PT Therapeutic Exercise (Units) 2   PT Therapeutic Activities (Units) 1   PT Total Time Spent   PT Individual Total Time Spent (Mins) 60   Roll Left and Right   Level of Assist Modified Independent   Sit to Lying   Level of Assist Modified Independent   Lying to Sitting on Side of Bed   Level of Assist Modified Independent   Sit to Stand   Level of Assist Modified Independent   Assistive Devices FWW   Chair/Bed-to-Chair Transfer   Level of Assist Modified Independent   Transfer Type Squat Pivot Transfer   Assistive Devices Wheelchair   Car Transfer   Level of Assist Modified Independent   Transfer Type Stand Step Transfer  (TTWB; therapy gym vehicle)   Assistive Device FWW   Ambulation   Level of Assist Supervised   Assistive Device FWW   Additional Description Extra time needed   Distance 35   Wheelchair   Level of Assist Modified Independent   Type Manual   Additional Description   (B UE propulsion)   Wheelchair Distance 150ft x 2   Interdisciplinary Plan of Care Collaboration   Patient Position at End of Therapy In Bed;Call Light within Reach;Tray Table within Reach;Phone within Reach;Family / Friend in Room         Therapeutic Activities  Purpose: to improve performance and function of daily activities and to increase safety with activities of daily life and mobility related to activities of daily life  Interventions:   Bed/chair transfer training  Standard queen mattress  Mod I stand pivot transfer with FWW; mod I sit  "<> supine  Car transfer    Therapeutic Exercise  Purpose: to improve strength and to improve functional endurance  Interventions:   Sitting balance with balloon batting (2 pound weighted bar) 2 x 1 minute  Hamstring curls with R LE 2 x 10 with blue theraband resistance  Supine glute sets 2 x 10 with 5 second hold  Single leg bridge (R LE only) 2 x 10    Gait Training  Purpose: to improve functional ambulation and to improve walking endurance  Interventions:   Safe use of device  Walking endurance    Assessment: Patient with good tolerance to activity, intermittent rest breaks required due to pain but patient highly motivated to participate. Good adherence to TTWB throughout as well as good safety awareness with transfers.     Strengths: Effective communication skills, Independent prior level of function, Motivated for self care and independence, Pleasant and cooperative, Willingly participates in therapeutic activities  Barriers: Decreased endurance, Fatigue, Home accessibility, Impaired activity tolerance, Impaired balance, Limited mobility, Pain, Pain poorly managed (TTWB L)    Plan:   Discharge home tomorrow (Thursday, 7/3) with support of family    DME    PT DME Recommendations  Wheelchair: 16\" Width, David Height (16\"-17\"), Lightweight, Elevating Leg Rests (left elevating leg rest)  Cushion: Standard  Assistive Device: Front Wheeled Walker  Additional Equipment:  (Pt has crutches (from the hospital), and access to her mother's FWW and commode per pt report)        Physical Therapy Problems (Active)       Problem: Mobility       Dates: Start:  06/28/25         Goal: STG-Within one week, patient will propel wheelchair community >150 ft mod I over indoor and outdoor surfaces        Dates: Start:  06/28/25         Goal Note filed on 06/30/25 1228 by Jennifer Batista DPT       Indoor- met, outdoors TBD              Goal: STG-Within one week, patient will ambulate household distance >25 ft x 2 with CGA consistently, " "while maintaining TTWB L with FWW       Dates: Start:  06/28/25         Goal Note filed on 06/30/25 1228 by Jennifer Batista DPT       Pt prefers NWB over TTWB demo'ing dec stability and safety              Goal: STG-Within one week, patient will ambulate up/down a 4\" curb with FWW min A        Dates: Start:  06/28/25         Goal Note filed on 06/30/25 1228 by Jennifer Batista DPT       TBD, unsafe on eval d/t inconsistencies with WB               Goal: STG-Within one week, patient will ascend and descend 3 stairs with BHR min A, while maintaining TTWB       Dates: Start:  06/28/25         Goal Note filed on 06/30/25 1228 by Jennifer Batista DPT       TBD, unsafe to on eval                  Problem: PT-Long Term Goals       Dates: Start:  06/28/25         Goal: LTG-By discharge, patient will propel wheelchair mod I >150 ft over outdoor surfaces       Dates: Start:  06/28/25         Goal Note filed on 07/02/25 1411 by Jennifer Batista DPT       Outdoor surfaces NT, fatigues easily, mod I indoor surfaces, son pushes her in wc when fatigued              Goal: LTG-By discharge, patient will ambulate >30ft indoors mod I with FWW, while maintaining TTWB L       Dates: Start:  06/28/25         Goal Note filed on 07/02/25 1411 by Jennifer Batista DPT       Set up                Goal: LTG-By discharge, patient will transfer one surface to another mod I with FWW       Dates: Start:  06/28/25         Goal Note filed on 07/02/25 1411 by Jennifer Batista DPT       Mod I SQPT, set up FWW              Goal: LTG-By discharge, patient will ambulate up/down 1 6\" step with FWW + 3 stairs with 1 crutch/ 1 HR CGA to enter/ exit mobile home       Dates: Start:  06/28/25         Goal Note filed on 07/02/25 1411 by Jennifer Batista DPT       Pt prefers bump up method for inc safety, FOF                "

## 2025-07-02 NOTE — DISCHARGE PLANNING
TC with [employer], attempting to speak with pt to schedule tub transfer bench to home and WC to be delivered today at bedside.   CM provided pt and spouse ph# as RN line busy to transfer to pts bedside phone at this time.     @1310: TC with [facility] [employer], DME approved and both will be delivered to bedside.

## 2025-07-02 NOTE — PROGRESS NOTES
"  Physical Medicine & Rehabilitation Progress Note    Encounter Date: 7/2/2025    Chief Complaint: LE fracture    Interval Events (Subjective):  VS: VSS  Last BM: 7/2  Bladder: Voiding   Schedule Meds Not Given: None   PRN Meds Taken: Oxy 15 - taking ~ 6x per day    Patient seen and examined in therapy gym. Doing well. Looking forward to going home. Doesn't have any questions. Not sure what time she'll leave tomorrow. D/w her weaning Oxycodone.       ROS: 14 point ROS negative unless otherwise specified in the HPI    Objective:  VITAL SIGNS: /79   Pulse 79   Temp 36.4 °C (97.6 °F) (Temporal)   Resp 18   Ht 1.636 m (5' 4.41\")   Wt 86.7 kg (191 lb 2.2 oz)   LMP  (LMP Unknown)   SpO2 93%   BMI 32.39 kg/m²     GEN: No apparent distress  HEENT: Head normocephalic, atraumatic.  Sclera nonicteric bilaterally, no ocular discharge appreciated bilaterally.  CV: Extremities warm and well-perfused, no peripheral edema appreciated bilaterally.  PULMONARY: Breathing nonlabored on room air, no respiratory accessory muscle use.  Not requiring supplemental oxygen.  SKIN: Surgical incisions c/d/i  PSYCH: Mood and affect within normal limits.  NEURO: Awake alert.  Conversational.  Logical thought content.      Laboratory Values:  Recent Results (from the past 72 hours)   Basic Metabolic Panel    Collection Time: 07/02/25  6:04 AM   Result Value Ref Range    Sodium 135 135 - 145 mmol/L    Potassium 3.6 3.6 - 5.5 mmol/L    Chloride 100 96 - 112 mmol/L    Co2 23 20 - 33 mmol/L    Glucose 88 65 - 99 mg/dL    Bun 5 (L) 8 - 22 mg/dL    Creatinine 0.69 0.50 - 1.40 mg/dL    Calcium 9.1 8.5 - 10.5 mg/dL    Anion Gap 12.0 7.0 - 16.0   CBC WITHOUT DIFFERENTIAL    Collection Time: 07/02/25  6:04 AM   Result Value Ref Range    WBC 4.6 (L) 4.8 - 10.8 K/uL    RBC 3.00 (L) 4.20 - 5.40 M/uL    Hemoglobin 10.5 (L) 12.0 - 16.0 g/dL    Hematocrit 32.1 (L) 37.0 - 47.0 %    .0 (H) 81.4 - 97.8 fL    MCH 35.0 (H) 27.0 - 33.0 pg    MCHC " 32.7 32.2 - 35.5 g/dL    RDW 75.1 (H) 35.9 - 50.0 fL    Platelet Count 369 164 - 446 K/uL    MPV 10.0 9.0 - 12.9 fL   ESTIMATED GFR    Collection Time: 07/02/25  6:04 AM   Result Value Ref Range    GFR (CKD-EPI) 118 >60 mL/min/1.73 m 2         Medications:  Scheduled Medications[1]  PRN medications: metaxalone, oxyCODONE immediate-release **OR** oxyCODONE immediate-release, lidocaine, hydrALAZINE, senna-docusate **AND** polyethylene glycol/lytes, lactulose, docusate sodium, bisacodyl EC, magnesium hydroxide, sodium phosphate, carboxymethylcellulose, benzocaine-menthol, mag hydrox-al hydrox-simeth, ondansetron **OR** ondansetron, traZODone, sodium chloride, [COMPLETED] acetaminophen **FOLLOWED BY** acetaminophen, ibuprofen    Diet:  Current Diet Order   Procedures    Diet Order Diet: Regular       Medical Decision Making and Plan:  L traumatic distal tibial fracture s/p ORIF with IMN Dr. Mcdaniel 6/20/25  PT and OT for mobility and ADLs. Per guidelines, 15 hours per week between PT, OT and/or SLP.  Follow-up Ortho  TTWB LLE     Pain - Scheduled Tylenol, Shan. S/p IV Ketorolac. PRN ibuprofen.  6/28 increase PRN Oxycodone to 10-15 mg every 3 hours as needed.  Discussed that this would only be increased for a few days, then would reduce oxycodone to a lower dose.     6/30 Pain more controlled with PRN Oxy 15mg. Monitor and decrease as able.   7/1 Taking ~ 6x per day.   7/2 D/w her weaning Roxicodone slowly and d/w surgeon for additional refill if needed, but goal to wean and stop completely.      EtOH Use + Chronic Tremors - Thiamine, folate, MVN     Mood Disorder - Continue Lexapro     Hypokalemia  -Potassium borderline low 3.5 on 6/28/2025.  Will replete with 20 milliequivalents potassium  Repeat 7/2 - WNL     ABLA + Macrocytic Anemia - hemoglobin 9.7 on 6/28/2025, stable. Recheck 7/2 - improved into 10's    Leukopenia - Possibly aberrant. Very mild.      Vitamin D Deficiency -15 on 6/28/2025.  Continue vitamin D  supplementation 5000 units daily     GERD - PPI     Bowel - Patient on Senna-docusate for constipation prophylaxis.      Bladder - TV/PVR/BS PRN    Obesity - Nutrition consult        Upcoming Labs/imaging: None     DVT PROPHYLAXIS: Lovenox --> aspirin x 4 weeks in OP setting      HOSPITALIST FOLLOWING: No     CODE STATUS: FULL      DISPO: Home with spouse      JIGAR: 7/3/25     MEDS SENT TO: [facility] in [place]     DISCHARGE SPECIALIST FOLLOW UP: Ortho (Jonas)     Patient to scheduled follow up with their PCP within 2 weeks from discharge from the [facility].    ____________________________________    Dr. Alondra Rausch DO, MS  [facility] - Physical Medicine & Rehabilitation   ____________________________________         [1]   Scheduled Medications   Medication Dose Frequency    senna-docusate  2 Tablet Q EVENING    enoxaparin (LOVENOX) injection  40 mg DAILY AT 1800    escitalopram  10 mg Q EVENING    gabapentin  100 mg TID    omeprazole  40 mg DAILY    polyethylene glycol/lytes  1 Packet DAILY    vitamin D3  5,000 Units DAILY

## 2025-07-03 VITALS
SYSTOLIC BLOOD PRESSURE: 116 MMHG | RESPIRATION RATE: 20 BRPM | HEART RATE: 82 BPM | OXYGEN SATURATION: 94 % | TEMPERATURE: 97.5 F | BODY MASS INDEX: 32.63 KG/M2 | WEIGHT: 191.14 LBS | DIASTOLIC BLOOD PRESSURE: 76 MMHG | HEIGHT: 64 IN

## 2025-07-03 PROCEDURE — 99239 HOSP IP/OBS DSCHRG MGMT >30: CPT | Performed by: PHYSICAL MEDICINE & REHABILITATION

## 2025-07-03 PROCEDURE — 700102 HCHG RX REV CODE 250 W/ 637 OVERRIDE(OP): Performed by: STUDENT IN AN ORGANIZED HEALTH CARE EDUCATION/TRAINING PROGRAM

## 2025-07-03 PROCEDURE — 700102 HCHG RX REV CODE 250 W/ 637 OVERRIDE(OP): Performed by: PHYSICAL MEDICINE & REHABILITATION

## 2025-07-03 PROCEDURE — A9270 NON-COVERED ITEM OR SERVICE: HCPCS | Performed by: PHYSICAL MEDICINE & REHABILITATION

## 2025-07-03 PROCEDURE — A9270 NON-COVERED ITEM OR SERVICE: HCPCS | Performed by: STUDENT IN AN ORGANIZED HEALTH CARE EDUCATION/TRAINING PROGRAM

## 2025-07-03 RX ORDER — OXYCODONE HYDROCHLORIDE 10 MG/1
10 TABLET ORAL EVERY 4 HOURS PRN
Qty: 42 TABLET | Refills: 0 | Status: SHIPPED | OUTPATIENT
Start: 2025-07-03 | End: 2025-07-10

## 2025-07-03 RX ADMIN — OXYCODONE HYDROCHLORIDE 15 MG: 10 TABLET ORAL at 06:19

## 2025-07-03 RX ADMIN — OXYCODONE HYDROCHLORIDE 15 MG: 10 TABLET ORAL at 12:30

## 2025-07-03 RX ADMIN — OXYCODONE HYDROCHLORIDE 15 MG: 10 TABLET ORAL at 16:01

## 2025-07-03 RX ADMIN — OXYCODONE HYDROCHLORIDE 15 MG: 10 TABLET ORAL at 03:15

## 2025-07-03 RX ADMIN — OXYCODONE HYDROCHLORIDE 15 MG: 10 TABLET ORAL at 09:39

## 2025-07-03 RX ADMIN — Medication 5000 UNITS: at 09:01

## 2025-07-03 RX ADMIN — GABAPENTIN 100 MG: 100 CAPSULE ORAL at 09:02

## 2025-07-03 RX ADMIN — GABAPENTIN 100 MG: 100 CAPSULE ORAL at 14:24

## 2025-07-03 RX ADMIN — OMEPRAZOLE 40 MG: 20 CAPSULE, DELAYED RELEASE ORAL at 09:02

## 2025-07-03 ASSESSMENT — PAIN DESCRIPTION - PAIN TYPE
TYPE: ACUTE PAIN;SURGICAL PAIN
TYPE: ACUTE PAIN;SURGICAL PAIN
TYPE: ACUTE PAIN

## 2025-07-03 ASSESSMENT — PATIENT HEALTH QUESTIONNAIRE - PHQ9
1. LITTLE INTEREST OR PLEASURE IN DOING THINGS: NOT AT ALL
SUM OF ALL RESPONSES TO PHQ9 QUESTIONS 1 AND 2: 0
2. FEELING DOWN, DEPRESSED, IRRITABLE, OR HOPELESS: NOT AT ALL
SUM OF ALL RESPONSES TO PHQ9 QUESTIONS 1 AND 2: 0
SUM OF ALL RESPONSES TO PHQ9 QUESTIONS 1 AND 2: 0
2. FEELING DOWN, DEPRESSED, IRRITABLE, OR HOPELESS: NOT AT ALL
2. FEELING DOWN, DEPRESSED, IRRITABLE, OR HOPELESS: NOT AT ALL

## 2025-07-03 NOTE — PROGRESS NOTES
Report received from NOC RN, care handed off and patient care assumed by this RN. Patient is without distress, call light and personal belongings within reach. Will continue to monitor.

## 2025-07-03 NOTE — PROGRESS NOTES
Patient discharged to home per order.  Discharge instructions reviewed with patient and family; they verbalize understanding and signed copies placed in chart.  Patient has all belongings; signed copy of form in chart.  Patient left facility at 16:50 via wheelchair accompanied by rehab staff and family.  Have enjoyed working with this pleasant patient.

## 2025-07-03 NOTE — PROGRESS NOTES
..NURSING DAILY NOTE    Name: Linda Carpenter   Date of Admission: 6/27/2025   Admitting Diagnosis: Fracture of left tibia  Attending Physician: NATIVIDAD MELENDEZ D.O.  Allergies: Cefaclor and Sulfamethoxazole w-trimethoprim    Safety  Patient Precautions  Precautions: Fall Risk, Weight Bearing  Fall Risk: Poor balance  Weight Bearing: TTWB LLE  Comments: PRAFO boot as tolerated in bed  Bed Transfer Status  Modified Independent  Toilet Transfer Status   Modified Independent  Assistive Devices  Wheelchair  Oxygen  None - Room Air  Diet/Therapeutic Dining  Current Diet Order   Procedures    Diet Order Diet: Regular     Pill Administration  whole    Fall Risk  Velez Ezio Fall Risk Scoring  10, LOW RISK  Fall Risk Safety Measures  bed alarm    Vitals  Temperature: 36.7 °C (98 °F)  Temp src: Oral  Pulse: 74  Respiration: 18  Blood Pressure: 107/66  Blood Pressure MAP (Calculated): 80 MM HG  BP Location: Right, Upper Arm  Patient BP Position: Supine     Oxygen  Pulse Oximetry: 91 %  O2 (LPM): 0  O2 Delivery Device: None - Room Air    Bowel and Bladder  Last Bowel Movement  07/02/25 (per patient)  Stool Type  Type 5: Soft blob with clear cut edges (passed easily)  Bowel Device  Toilet  Bladder Function  Urine Void (mL):  (large)  Number of Times Voided: 1  Urine Color: Unable To Evaluate  Urine Clarity: Clear  Genitourinary Assessment   Bladder Assessment (WDL):  Within Defined Limits  Urine Color: Unable To Evaluate  Urine Clarity: Clear  Bladder Device: Toilet    Skin  Carmelo Score   18  Sensory Interventions   Bed Types: Standard/Trauma Mattress  Skin Preventative Measures: Pillows in Use for Support / Positioning    Pain  Pain Rating Scale  8 - Awful, hard to do anything  Pain Location  Knee, Ankle  Pain Location Orientation  Right  Pain Interventions   Medication (see MAR)    ADLs    Oral Care  Brushed Teeth  Environmental Precautions  Treaded Slipper Socks on Patient, Personal Belongings, Wastebasket, Call  "Arnett etc. in Easy Reach, Bed in Low Position  Patient Turns/Positioning  Patient turns self independently side to side without assistance, to offload sacral area  Patient Turns Assistance/Tolerance  Assistance of One  Head of Bed Elevated  Self regulated      Psychosocial/Neurologic Assessment  Psychosocial Assessment  Psychosocial (WDL):  Within Defined Limits  Neurologic Assessment  Neuro (WDL): Exceptions to WDL (neuropathy feet and fingers)  Level of Consciousness: Alert  Orientation Level: Oriented X4  Cognition: Follows commands, Appropriate attention/concentration  Speech: Clear  LLE Sensation: Tingling (\"pins and needles\")  EENT (WDL):  Within Defined Limits    Cardio/Pulmonary Assessment  Edema   LLE Edema: Generalized  Respiratory Breath Sounds  WDL  Cardiac Assessment   Cardiac (WDL):  Within Defined Limits              "

## 2025-07-03 NOTE — DISCHARGE SUMMARY
"  Physical Medicine & Rehabilitation Discharge Summary    Admission Date: 6/27/2025    Discharge Date: 7/3/2025    Attending Provider: Alondra Rausch DO, MS    Admission Diagnosis:   Active Hospital Problems    Diagnosis     *Fracture of left tibia     History of lower leg fracture     Vitamin D insufficiency     Alcohol withdrawal syndrome without complication (HCC)     Gastroesophageal reflux disease     Cirrhosis (HCC)        Discharge Diagnosis:  Active Hospital Problems    Diagnosis     *Fracture of left tibia     History of lower leg fracture     Vitamin D insufficiency     Alcohol withdrawal syndrome without complication (HCC)     Gastroesophageal reflux disease     Cirrhosis (HCC)        HPI per Admission History & Physical:  Adapted from the PM&R Consult by Dr. Lobato:   \"HPI: The patient is a 29 y.o.  female with a past medical history of alcoholism, liver cirrhosis, GERD who presented on 6/23/2025  1:49 PM after GLF. XR showed distal L tibia fracture.  Patient admitted to drinking heavily prior to admission and was found to have tremors and anxiety.  She was placed on CIWA protocol.  Patient was evaluated by orthopedic surgery, and taken to the OR on 6/24/2025 for ORIF by Rashaun Mcdaniel MD.  TTWB LLE. Current labs remarkable for Hgb 9.1, K3.5. Vitals remarkable for intermittent hypertension.  Patient seen by PT/OT with recommendation for post-acute placement. PM&R consulted to evaluate for possible inpatient rehab admission.\"     On admission the patient is that overall she is doing fine.  She notes pain in her left leg is not adequately controlled with oxycodone 10 mg.  She cannot sleep well with due to her pain.  She feels that her pain limits her participation in therapies.  She notes side effect of constipation.  She denies fevers or chills.     Patient was admitted to [facility] on 6/27/2025.     Hospital Course by Problem List:  L traumatic distal tibial fracture s/p ORIF " with IMN Dr. Mcdaniel 6/20/25  PT and OT for mobility and ADLs. Per guidelines, 15 hours per week between PT, OT and/or SLP.  Follow-up Ortho  TTWB LLE     Pain - Scheduled Tylenol, Shan. S/p IV Ketorolac. PRN ibuprofen.  6/28 increase PRN Oxycodone to 10-15 mg every 3 hours as needed.  Discussed that this would only be increased for a few days, then would reduce oxycodone to a lower dose.      6/30 Pain more controlled with PRN Oxy 15mg. Monitor and decrease as able.   7/1 Taking ~ 6x per day.   7/2 D/w her weaning Roxicodone slowly and d/w surgeon for additional refill if needed, but goal to wean and stop completely.     I discussed the risks and benefits of using opiate medications for pain control.  I discussed the risk of addiction, potential for overdose, and respiratory depression (and the potential need for opiate antagonist therapy if this occurs).  I encouraged the patient to take this medication sparingly with the expressed goal of weaning off the medication as soon as is clinically appropriate.  I informed the patient that we are only able to provide a 7 day supply of these medications at discharge and that they will be responsible for requesting any refills needed from their primary care provider or their surgeon.  We discussed the need to safely secure these medications to prevent theft, inadvertent ingestion, or misuse.  Any unused medication should be immediately disposed of through a sanctioned medication disposal program.  We discussed adjunctive pain medications and conservative therapies at length.      I answered the patient's questions regarding this treatment, and the patient indicated understanding and willingness to proceed.       EtOH Use + Chronic Tremors - Thiamine, folate, MVN     Mood Disorder - Continue Lexapro     Hypokalemia  -Potassium borderline low 3.5 on 6/28/2025.  Will replete with 20 milliequivalents potassium  Repeat 7/2 - WNL     ABLA + Macrocytic Anemia - hemoglobin 9.7 on  6/28/2025, stable. Recheck 7/2 - improved into 10's     Leukopenia - Possibly aberrant. Very mild.      Vitamin D Deficiency -15 on 6/28/2025.  Continue vitamin D supplementation 5000 units daily     GERD - PPI     Bowel - Patient on Senna-docusate for constipation prophylaxis.      Bladder - TV/PVR/BS PRN      Obesity - Nutrition consult        Upcoming Labs/imaging: None     DVT PROPHYLAXIS: Lovenox --> aspirin x 4 weeks in OP setting      HOSPITALIST FOLLOWING: No     CODE STATUS: FULL      DISPO: Home with spouse      JIGAR: 7/3/25     MEDS SENT TO: [facility] in [place]     DISCHARGE SPECIALIST FOLLOW UP: Ortho (Jonas)     Patient to scheduled follow up with their PCP within 2 weeks from discharge from the [facility].      Functional Status at Discharge    Bed Mobility Roll Left and Right: Modified Independent  Sit to Lying: Modified Independent  Lying to Sitting: Modified Independent   Transfers Sit to Stand: Modified Independent  Chair/Bed to Chair: Modified Independent  Toilet: Modified Independent  Car: Modified Independent   Mobility Ambulation: Set up  Device: FWW  Ambulation Distance: 50  Wheelchair: Modified Independent  Type: Manual  Wheelchair Distance: 150   Stairs/Curbs Stairs: Set up  Stairs Amount: 4  Curbs: Contact Guard Assist     Oral Hygiene Independent   Grooming Independent   Shower/Bathing Contact Guard Assist   UB Dressing Independent   LB Dressing Minimal Assist  Minimal Assist   Toileting Transfer: Modified Independent  Hygiene: Modified Independent   Shower & Transfer Modified Independent       Comprehension Level of Assist:     Comprehension Description:     Expression Level of Assist:     Expression Description:     Social Interaction Level of Assist:     Social Interaction Description:     Problem Solving Level of Assist:     Problem Solving Description:     Memory Level of Assist:     Memory Description:       Alondra HUSSEIN D.O., personally performed a  complete drug regimen review and no potential clinically significant medication issues were identified.       Discharge Medication:     Medication List        START taking these medications        Instructions   aspirin 81 MG EC tablet   Take 1 Tablet by mouth 2 times a day. Indications: DVT ppx  Dose: 81 mg            CHANGE how you take these medications        Instructions   oxycodone 15 MG immediate release tablet  What changed:   medication strength  how much to take  when to take this  Commonly known as: Oxy-IR   Take 1 Tablet by mouth every four hours as needed for Severe Pain for up to 7 days. Indications: Acute Pain  Dose: 15 mg            CONTINUE taking these medications        Instructions   acetaminophen 500 MG Tabs  Start taking on: June 27, 2025  Commonly known as: Tylenol   Take 2 Tablets by mouth every 6 hours for 3 days, THEN 2 Tablets every 6 hours as needed for Mild Pain for up to 7 days.     escitalopram 10 MG Tabs  Commonly known as: Lexapro  Notes to patient: Anxiety, depression, OCD disorders    Take 1 Tablet by mouth every evening.  Dose: 10 mg     gabapentin 100 MG Caps  Commonly known as: Neurontin  Notes to patient: Nerve pain    Take 1 Capsule by mouth 3 times a day.  Dose: 100 mg     pantoprazole 40 MG Tbec  Commonly known as: Protonix  Notes to patient: Prevent stomach ulcers, GERD    Take 1 Tablet by mouth every day.  Dose: 40 mg     senna-docusate 8.6-50 MG Tabs  Commonly known as: Pericolace Or Senokot S  Notes to patient: Stool softener    Take 2 Tablets by mouth every evening.  Dose: 2 Tablet     Vitamin D3 25 MCG Tabs  Notes to patient: Supplement    Take 5 Tablets by mouth every day.  Dose: 5,000 Units            STOP taking these medications      celecoxib 100 MG Caps  Commonly known as: CeleBREX     enoxaparin 40 MG/0.4ML Sosy inj  Commonly known as: Lovenox     ibuprofen 800 MG Tabs  Commonly known as: Motrin     polyethylene glycol/lytes 17 g Pack  Commonly known as:  Miralax     spironolactone 25 MG Tabs  Commonly known as: Aldactone              Discharge Diet:  Current Diet Order   Procedures    Diet Order Diet: Regular       Discharge Activity:  Per PT/OT    Disposition:  Patient to discharge home with family support and community resources.    Equipment:  Per PT/OT    Follow-up & Discharge Instructions:  Follow up with your primary care provider (PCP) within 7-10 days of discharge to review your medications and take over your care.     If you develop chest pain, fever, chills, change in neurologic function (weakness, sensation changes, vision changes), or other concerning sxs, seek immediate medical attention or call 911.      No future appointments.    Condition on Discharge:  Good    More than 35 minutes was spent on discharging this patient, including face-to-face time, prescription management, and the dictation of this note.    Dr. Alondra Rausch DO, MS  Department of Physical Medicine & Rehabilitation    Date of Service: 7/3/2025

## 2025-07-03 NOTE — PROGRESS NOTES
NURSING DAILY NOTE    Name: Linda Carpenter   Date of Admission: 6/27/2025   Admitting Diagnosis: Fracture of left tibia  Attending Physician: Alondra Rausch D.o.  Allergies: Cefaclor and Sulfamethoxazole w-trimethoprim    Safety  Patient Assist     Patient Precautions     Precaution Comments     Bed Transfer Status     Toilet Transfer Status      Assistive Devices  Wheelchair  Oxygen  None - Room Air  Diet/Therapeutic Dining  Current Diet Order   Procedures    Diet Order Diet: Regular     Pill Administration  whole  Agitated Behavioral Scale     ABS Level of Severity       Fall Risk  Has the patient had a fall this admission?   No  Rosie Holguin Fall Risk Scoring  14, MODERATE RISK  Fall Risk Safety Measures  bed alarm, chair alarm, poor balance, and low vision/ hearing    Vitals  Temperature: 36.3 °C (97.4 °F)  Temp src: Oral  Pulse: 82  Respiration: 18  Blood Pressure: 106/68  Blood Pressure MAP (Calculated): 81 MM HG  BP Location: Left, Upper Arm  Patient BP Position: Supine     Oxygen  Pulse Oximetry: 94 %  O2 (LPM): 0  O2 Delivery Device: None - Room Air    Bowel and Bladder  Last Bowel Movement  07/02/25  Stool Type  Type 5: Soft blob with clear cut edges (passed easily)  Bowel Device  Toilet  Continent  Bladder: Continent void   Bowel: Continent movement  Bladder Function  Urine Void (mL):  (LARGE)  Number of Times Voided: 1  Urine Color: Unable To Evaluate  Urine Clarity: Clear  Genitourinary Assessment   Bladder Assessment (WDL):  Within Defined Limits  Erickson Catheter: Not Applicable  Urine Color: Unable To Evaluate  Urine Clarity: Clear  Bladder Device: Toilet  Time Void: Yes  Bladder Scan: Post Void  $ Bladder Scan Results (mL): 21    Skin  Carmelo Score   16  Sensory Interventions   Bed Types: Standard/Trauma Mattress  Skin Preventative Measures: Pillows in Use for Support / Positioning  Moisture Interventions         Pain  Pain Rating  "Scale  9 - Can't bear the pain, unable to do anything  Pain Location  Knee, Leg  Pain Location Orientation  Left  Pain Interventions   Medication (see MAR)    ADLs    Bathing   Shower  Linen Change      Personal Hygiene     Chlorhexidine Bath      Oral Care  Brushed Teeth  Teeth/Dentures     Shave     Nutrition Percentage Eaten     Environmental Precautions  Treaded Slipper Socks on Patient, Personal Belongings, Wastebasket, Call Bell etc. in Easy Reach, Bed in Low Position  Patient Turns/Positioning  Patient turns self independently side to side without assistance, to offload sacral area  Patient Turns Assistance/Tolerance  Assistance of One  Bed Positions     Head of Bed Elevated  Self regulated      Psychosocial/Neurologic Assessment  Psychosocial Assessment  Psychosocial (WDL):  Within Defined Limits  Neurologic Assessment  Neuro (WDL): Within Defined Limits  Level of Consciousness: Alert  Orientation Level: Oriented X4  Cognition: Follows commands, Appropriate attention/concentration  Speech: Clear  Pupil & Vision Field Assessment: No  Motor Function, Sensation & Ataxia Assessment: Sensation  LLE Sensation: Tingling (\"pins and needles\")  EENT (WDL):  Within Defined Limits    Cardio/Pulmonary Assessment  Edema   LLE Edema: Generalized  Respiratory Breath Sounds  RUL Breath Sounds: Clear  RML Breath Sounds: Clear  RLL Breath Sounds: Clear  MARIA E Breath Sounds: Clear  LLL Breath Sounds: Clear  Cardiac Assessment   Cardiac (WDL):  Within Defined Limits         "

## 2025-07-08 NOTE — Clinical Note
REFERRAL APPROVAL NOTICE         Sent on July 8, 2025                   Linda Pallavi Carpenter  [address] [facility] [address]                   Dear Ms. Carpenter,    After a careful review of the medical information and benefit coverage, [facility] has processed your referral. See below for additional details.    If applicable, you must be actively enrolled with your insurance for coverage of the authorized service. If you have any questions regarding your coverage, please contact your insurance directly.    REFERRAL INFORMATION   Referral #:  [id number]  Referred-To Department    Referred-By Provider:  Orthopedics    Alondra Rausch D.O.   [facility]      [address]  [address]  [facility] [id number] [phone] [address] [place] [id number]  [phone]    Referral Start Date:  07/01/2025  Referral End Date:   07/01/2026             SCHEDULING  If you do not already have an appointment, please call [phone] to make an appointment.     MORE INFORMATION  If you do not already have a [employer] account, sign up at: [email or web address]  You can access your medical information, make appointments, see lab results, billing information, and more.  If you have questions regarding this referral, please contact  the [facility] Referrals department at:             [phone]. Monday - Friday 8:00AM - 5:00PM.     Sincerely,    [facility]

## (undated) DEVICE — KIT  I.V. START (100EA/CA)

## (undated) DEVICE — COVER LIGHT HANDLE ALC PLUS DISP (18EA/BX)

## (undated) DEVICE — NEPTUNE 4 PORT MANIFOLD - (20/PK)

## (undated) DEVICE — Device

## (undated) DEVICE — CONTAINER, SPECIMEN, STERILE

## (undated) DEVICE — MASK PANORAMIC OXYGEN PRO2 (30EA/CA)

## (undated) DEVICE — GUIDE WIRE BALL TIP 3.0X800MM STERILE

## (undated) DEVICE — PLUMEPEN ULTRA 3/8 IN X 10 FT HOSE (20EA/CA)

## (undated) DEVICE — GLOVE SZ 7 BIOGEL PI MICRO - PF LF (50PR/BX 4BX/CA)

## (undated) DEVICE — DRAPE LOWER EXTREMETY - (6/CA)

## (undated) DEVICE — GLOVE BIOGEL PI INDICATOR SZ 7.5 SURGICAL PF LF -(50/BX 4BX/CA)

## (undated) DEVICE — SUCTION INSTRUMENT YANKAUER BULBOUS TIP W/O VENT (50EA/CA)

## (undated) DEVICE — SPONGE GAUZESTER 4 X 4 4PLY - (128PK/CA)

## (undated) DEVICE — BANDAGE ELASTIC 4 HONEYCOMB - 4"X5YD LF (20/CA)"

## (undated) DEVICE — GOWN WARMING STANDARD FLEX - (30/CA)

## (undated) DEVICE — REAMER SHAFT MODIFIED TRINKLE 8MM X 510MM (1EA)

## (undated) DEVICE — PADDING CAST 6 IN STERILE - 6 X 4 YDS (24/CA)

## (undated) DEVICE — SET EXTENSION WITH 2 PORTS (48EA/CA) ***PART #2C8610 IS A SUBSTITUTE*****

## (undated) DEVICE — DRAPE C ARMOR (12EA/CA)

## (undated) DEVICE — ELECTRODE 850 FOAM ADHESIVE - HYDROGEL RADIOTRNSPRNT (50/PK)

## (undated) DEVICE — TUBE CONNECTING SUCTION - CLEAR PLASTIC STERILE 72 IN (50EA/CA)

## (undated) DEVICE — PAD LAP STERILE 18 X 18 - (5/PK 40PK/CA)

## (undated) DEVICE — DRESSING XEROFORM 1X8 - (50/BX 4BX/CA)

## (undated) DEVICE — GLOVE SZ 7.5 BIOGEL PI MICRO - PF LF (50PR/BX)

## (undated) DEVICE — GOWN SURGEONS X-LARGE - DISP. (30/CA)

## (undated) DEVICE — SLEEVE VASO DVT COMPRESSION CALF MED - (10PR/CA)

## (undated) DEVICE — GLOVE SZ 6.5 BIOGEL PI MICRO - PF LF (50PR/BX)

## (undated) DEVICE — LOCKING DRILL 4.2X360MM

## (undated) DEVICE — TUBING CLEARLINK DUO-VENT - C-FLO (48EA/CA)

## (undated) DEVICE — SODIUM CHL IRRIGATION 0.9% 1000ML (12EA/CA)

## (undated) DEVICE — GLOVE BIOGEL PI ORTHO SZ 6 1/2 SURGICAL PF LF (40PR/BX)

## (undated) DEVICE — FREEHAND DRILL 4.2X130MM

## (undated) DEVICE — CANISTER SUCTION 3000ML MECHANICAL FILTER AUTO SHUTOFF MEDI-VAC NONSTERILE LF DISP (40EA/CA)

## (undated) DEVICE — SET LEADWIRE 5 LEAD BEDSIDE DISPOSABLE ECG (1SET OF 5/EA)

## (undated) DEVICE — LACTATED RINGERS INJ 1000 ML - (14EA/CA 60CA/PF)

## (undated) DEVICE — BLOCK BITE MAXI DENTAL RETENTION RIM (100EA/BX)

## (undated) DEVICE — BANDAGE ELASTIC 6 HONEYCOMB - 6X5YD LF (20/CA)"

## (undated) DEVICE — SUTURE 2-0 VICRYL PLUS CT-1 - 8 X 18 INCH(12/BX)

## (undated) DEVICE — SENSOR OXIMETER ADULT SPO2 RD SET (20EA/BX)

## (undated) DEVICE — KIT PROCEDURE DOUBLE ENDO ONLY (5/CA)

## (undated) DEVICE — CANISTER SUCTION RIGID RED 1500CC (40EA/CA)

## (undated) DEVICE — GLOVE BIOGEL PI INDICATOR SZ 7.0 SURGICAL PF LF - (50/BX 4BX/CA)

## (undated) DEVICE — GLOVE SIZE 8.0 SURGEON ACCELERATOR FREE GREEN (50PR/BX)

## (undated) DEVICE — TOWEL STOP TIMEOUT SAFETY FLAG (40EA/CA)

## (undated) DEVICE — SUTURE 0 VICRYL PLUS CT-1 - 8 X 18 INCH (12/BX)

## (undated) DEVICE — PORT AUXILLARY WATER (50EA/BX)

## (undated) DEVICE — GLOVE BIOGEL PI INDICATOR SZ 6.5 SURGICAL PF LF - (50/BX 4BX/CA)

## (undated) DEVICE — GLOVE BIOGEL SZ 7.5 SURGICAL PF LTX - (50PR/BX 4BX/CA)

## (undated) DEVICE — WATER IRRIGATION STERILE 1000ML (12EA/CA)

## (undated) DEVICE — SUTURE GENERAL

## (undated) DEVICE — STAPLER SKIN DISP - (6/BX 10BX/CA) VISISTAT

## (undated) DEVICE — GOWN SURGEONS LARGE - (32/CA)

## (undated) DEVICE — SUTURE 3-0 ETHILON FS-1 - (36/BX) 30 INCH

## (undated) DEVICE — BUTTON ENDOSCOPY DISPOSABLE